# Patient Record
Sex: FEMALE | Race: WHITE | Employment: OTHER | URBAN - METROPOLITAN AREA
[De-identification: names, ages, dates, MRNs, and addresses within clinical notes are randomized per-mention and may not be internally consistent; named-entity substitution may affect disease eponyms.]

---

## 2017-02-16 ENCOUNTER — OFFICE VISIT (OUTPATIENT)
Dept: INTERNAL MEDICINE CLINIC | Facility: CLINIC | Age: 82
End: 2017-02-16

## 2017-02-16 VITALS
SYSTOLIC BLOOD PRESSURE: 110 MMHG | HEIGHT: 57.25 IN | BODY MASS INDEX: 29.05 KG/M2 | OXYGEN SATURATION: 97 % | RESPIRATION RATE: 16 BRPM | WEIGHT: 134.63 LBS | DIASTOLIC BLOOD PRESSURE: 60 MMHG | HEART RATE: 61 BPM | TEMPERATURE: 98 F

## 2017-02-16 DIAGNOSIS — M79.631 RIGHT FOREARM PAIN: Primary | ICD-10-CM

## 2017-02-16 DIAGNOSIS — J47.9 BRONCHIECTASIS WITHOUT COMPLICATION (HCC): ICD-10-CM

## 2017-02-16 DIAGNOSIS — I10 ESSENTIAL HYPERTENSION: ICD-10-CM

## 2017-02-16 DIAGNOSIS — R05.9 COUGH: ICD-10-CM

## 2017-02-16 PROCEDURE — 99214 OFFICE O/P EST MOD 30 MIN: CPT | Performed by: INTERNAL MEDICINE

## 2017-02-16 RX ORDER — PANTOPRAZOLE SODIUM 40 MG/1
40 TABLET, DELAYED RELEASE ORAL
Qty: 30 TABLET | Refills: 2 | Status: SHIPPED | OUTPATIENT
Start: 2017-02-16 | End: 2017-06-22

## 2017-02-16 NOTE — PROGRESS NOTES
Sandie Epperson is a 80year old female.  To F/U from last visit regarding rash and cough   HPI:    Interim history:her  has stage 4 bladder cancer    The cough she had years ago chronically came back in June, occasional cough, can have clear or gree mouth daily. Disp:  Rfl:    WARFARIN SODIUM 2.5 MG OR TABS 1 tab po qm,w,fr,sun Disp:  Rfl:    TYLENOL 325 MG OR TABS 1 Tab daily as needed.  Disp:  Rfl:    BENADRYL 25 MG OR CAPS 1 tab po qhs prn Disp:  Rfl:          Social History:    Smoking Status: Tesfaye Freeman entrapment, send to hand if perssits  Bronchiectasis without complication (hcc)- this was the ultimate diagnosis years ago when she was being evaluated for a cough but nothing helped then it just resolved  Cough- recurrent, ? GERD/PND/bronchiectasis- try pr

## 2017-03-09 RX ORDER — ATORVASTATIN CALCIUM 10 MG/1
TABLET, FILM COATED ORAL
Qty: 90 TABLET | Refills: 0 | Status: SHIPPED | OUTPATIENT
Start: 2017-03-09 | End: 2017-06-20

## 2017-04-24 PROBLEM — E78.2 MIXED HYPERLIPIDEMIA: Status: ACTIVE | Noted: 2017-04-24

## 2017-04-24 PROBLEM — R94.31 NONSPECIFIC ABNORMAL ELECTROCARDIOGRAM (ECG) (EKG): Status: ACTIVE | Noted: 2017-04-24

## 2017-06-10 ENCOUNTER — HOSPITAL ENCOUNTER (OUTPATIENT)
Age: 82
Discharge: HOME | End: 2017-06-10
Attending: EMERGENCY MEDICINE
Payer: MEDICARE

## 2017-06-10 VITALS
OXYGEN SATURATION: 98 % | DIASTOLIC BLOOD PRESSURE: 68 MMHG | TEMPERATURE: 98 F | HEART RATE: 72 BPM | SYSTOLIC BLOOD PRESSURE: 126 MMHG | RESPIRATION RATE: 16 BRPM

## 2017-06-10 DIAGNOSIS — N30.00 ACUTE CYSTITIS WITHOUT HEMATURIA: Primary | ICD-10-CM

## 2017-06-10 PROCEDURE — 87086 URINE CULTURE/COLONY COUNT: CPT | Performed by: EMERGENCY MEDICINE

## 2017-06-10 PROCEDURE — 87186 SC STD MICRODIL/AGAR DIL: CPT | Performed by: EMERGENCY MEDICINE

## 2017-06-10 PROCEDURE — 99214 OFFICE O/P EST MOD 30 MIN: CPT

## 2017-06-10 PROCEDURE — 81002 URINALYSIS NONAUTO W/O SCOPE: CPT

## 2017-06-10 PROCEDURE — 87088 URINE BACTERIA CULTURE: CPT | Performed by: EMERGENCY MEDICINE

## 2017-06-10 PROCEDURE — 99204 OFFICE O/P NEW MOD 45 MIN: CPT

## 2017-06-10 RX ORDER — CIPROFLOXACIN 500 MG/1
500 TABLET, FILM COATED ORAL 2 TIMES DAILY
Qty: 14 TABLET | Refills: 0 | Status: SHIPPED | OUTPATIENT
Start: 2017-06-10 | End: 2017-06-12

## 2017-06-10 NOTE — ED PROVIDER NOTES
Patient presents with:  Urinary Symptoms (urologic)      HPI:     Abeba Harrison is a 80year old female who presents with dysuria for the past 1 day. Slowly getting worse. + frequency. No fever No back pain.   No rigors or chills  No bloody urine      HIS Never Smoker                      Smokeless Status: Never Used                        Alcohol Use: Yes           0.0 - 0.5 oz/week       0-1 Standard drinks or equivalent per week       Comment: socially           ROS:   Pertinent positives dysuria and dragan and needs recheck PT and INR. Await Urine cultures  Push fluids and rest.  Go to ER if with high fevers chest pain shortness of breath flank pain etc.    All results reviewed and discussed with patient.   See AVS for detailed discharge instructions for you

## 2017-06-11 NOTE — ED NOTES
Pt called back. Pt informed preliminary urine culture results were back but we're still waiting on the sensitivity report. Pt states she's not comfortable continuing the ciprofloxacin and will stop taking it.  Pt states she will call back tomorrow for the f

## 2017-06-12 RX ORDER — SULFAMETHOXAZOLE AND TRIMETHOPRIM 800; 160 MG/1; MG/1
1 TABLET ORAL 2 TIMES DAILY
Qty: 10 TABLET | Refills: 0 | Status: SHIPPED | OUTPATIENT
Start: 2017-06-12 | End: 2017-06-17

## 2017-06-12 NOTE — ED NOTES
Spoke with patient over the phone regarding urine culture results. Patient was prescribed ciprofloxacin sensitive to E. coli. Patient took only 1 dose of Cipro so far and would like to change it to something else because of her side effect profile.   Areli

## 2017-06-21 ENCOUNTER — TELEPHONE (OUTPATIENT)
Dept: INTERNAL MEDICINE CLINIC | Facility: CLINIC | Age: 82
End: 2017-06-21

## 2017-06-21 RX ORDER — ATORVASTATIN CALCIUM 10 MG/1
TABLET, FILM COATED ORAL
Qty: 90 TABLET | Refills: 0 | Status: SHIPPED | OUTPATIENT
Start: 2017-06-21 | End: 2017-10-02

## 2017-06-21 NOTE — TELEPHONE ENCOUNTER
Spoke with pt. Advised as below. Pt voiced understanding. Pt states that she wants to see  Dr. Wale Burgess only. States that she had urinary burning one time today. Scheduled OV 6/22/17.

## 2017-06-21 NOTE — TELEPHONE ENCOUNTER
Patient called to see if there was a reply to her earlier message yet. She will be leaving the house for about an hour and would like the nurse to leave a message on her home voicemail, if she's not home at time of the call.

## 2017-06-21 NOTE — TELEPHONE ENCOUNTER
Pt went to [facility] 8-10 with UTI, finished antibiotic Friday, still has a bit of burning, just doesn't feel right. Not sure if she needs another antibiotic? Will not take cipro. Please advise.  Thank you

## 2017-06-22 ENCOUNTER — OFFICE VISIT (OUTPATIENT)
Dept: INTERNAL MEDICINE CLINIC | Facility: CLINIC | Age: 82
End: 2017-06-22

## 2017-06-22 VITALS
RESPIRATION RATE: 16 BRPM | OXYGEN SATURATION: 98 % | BODY MASS INDEX: 29.34 KG/M2 | WEIGHT: 136 LBS | TEMPERATURE: 97 F | SYSTOLIC BLOOD PRESSURE: 118 MMHG | HEART RATE: 64 BPM | HEIGHT: 57.25 IN | DIASTOLIC BLOOD PRESSURE: 62 MMHG

## 2017-06-22 DIAGNOSIS — R39.9 UTI SYMPTOMS: Primary | ICD-10-CM

## 2017-06-22 DIAGNOSIS — R31.9 HEMATURIA: ICD-10-CM

## 2017-06-22 PROCEDURE — 99213 OFFICE O/P EST LOW 20 MIN: CPT | Performed by: INTERNAL MEDICINE

## 2017-06-22 PROCEDURE — 81003 URINALYSIS AUTO W/O SCOPE: CPT | Performed by: INTERNAL MEDICINE

## 2017-06-22 PROCEDURE — 81001 URINALYSIS AUTO W/SCOPE: CPT | Performed by: INTERNAL MEDICINE

## 2017-06-22 PROCEDURE — 87086 URINE CULTURE/COLONY COUNT: CPT | Performed by: INTERNAL MEDICINE

## 2017-06-22 NOTE — PROGRESS NOTES
Luisana Morales is a 80year old female  Patient presents with:   Follow - Up: [facility] Visit - UTI, still having symptoms      HPI:   Had dysuria 10 days ago, went to [facility], took cipro, had diarrhea, changed to bactrim and took for 5 more days until 6 days ago, s term (current) use of anticoagulants     Nonspecific abnormal electrocardiogram (ECG) (EKG)     Mixed hyperlipidemia     Social History:    Smoking Status: Never Smoker                      Smokeless Status: Never Used                        Alcohol Use: Y Encounter  Urine Dip, auto without Micro  URINALYSIS, W MICROSCOPIC [2403][Q]  *Specimen Handling  Urine Culture, Routine [E]    Meds & Refills for this Visit:  No prescriptions requested or ordered in this encounter    Imaging & Consults:  None    No Foll

## 2017-08-10 ENCOUNTER — OFFICE VISIT (OUTPATIENT)
Dept: INTERNAL MEDICINE CLINIC | Facility: CLINIC | Age: 82
End: 2017-08-10

## 2017-08-10 VITALS
HEART RATE: 59 BPM | BODY MASS INDEX: 28.55 KG/M2 | HEIGHT: 57.75 IN | SYSTOLIC BLOOD PRESSURE: 104 MMHG | DIASTOLIC BLOOD PRESSURE: 52 MMHG | WEIGHT: 136 LBS | TEMPERATURE: 98 F | OXYGEN SATURATION: 98 % | RESPIRATION RATE: 16 BRPM

## 2017-08-10 DIAGNOSIS — R73.02 IGT (IMPAIRED GLUCOSE TOLERANCE): Primary | ICD-10-CM

## 2017-08-10 DIAGNOSIS — R05.3 CHRONIC COUGH: ICD-10-CM

## 2017-08-10 DIAGNOSIS — I10 ESSENTIAL HYPERTENSION: ICD-10-CM

## 2017-08-10 DIAGNOSIS — Z12.31 VISIT FOR SCREENING MAMMOGRAM: ICD-10-CM

## 2017-08-10 DIAGNOSIS — E78.2 MIXED HYPERLIPIDEMIA: ICD-10-CM

## 2017-08-10 DIAGNOSIS — Z00.00 ENCOUNTER FOR ANNUAL HEALTH EXAMINATION: ICD-10-CM

## 2017-08-10 PROCEDURE — 99214 OFFICE O/P EST MOD 30 MIN: CPT | Performed by: INTERNAL MEDICINE

## 2017-08-10 PROCEDURE — G0439 PPPS, SUBSEQ VISIT: HCPCS | Performed by: INTERNAL MEDICINE

## 2017-08-10 RX ORDER — OMEPRAZOLE 40 MG/1
40 CAPSULE, DELAYED RELEASE ORAL DAILY
Qty: 90 CAPSULE | Refills: 0 | Status: SHIPPED | OUTPATIENT
Start: 2017-08-10 | End: 2018-08-05

## 2017-08-10 NOTE — PATIENT INSTRUCTIONS
Casey Amin's SCREENING SCHEDULE   Tests on this list are recommended by your physician but may not be covered, or covered at this frequency, by your insurer. Please check with your insurance carrier before scheduling to verify coverage.    PREVENTATI or any previous visit.  Limited to patients who meet one of the following criteria:   • Men who are 73-68 years old and have smoked more than 100 cigarettes in their lifetime   • Anyone with a family history    Colorectal Cancer Screening  Covered up to Age Immunizations      Influenza  Covered Annually   Orders placed or performed in visit on 10/21/14  -FLU VACC PRSV FREE INC ANTIG    Please get every year    Pneumococcal 13 (Prevnar)  Covered Once after 65   Orders placed or performed in visit on 07/24/15 Directives.

## 2017-08-10 NOTE — PROGRESS NOTES
HPI:   Barrie Curry is a 80year old female who presents for a Medicare Subsequent Annual Wellness visit (Pt already had Initial Annual Wellness).     She also has IGT, HTN and high cholesterol and bronchiestasis  She sees cards for PAF  She has a  encounter (Office Visit) with Kerline Newton MD:  Omeprazole 40 MG Oral Capsule Delayed Release Take 1 capsule (40 mg total) by mouth daily.    ATORVASTATIN 10 MG Oral Tab TAKE 1 TABLET BY MOUTH EVERY NIGHT AT BEDTIME   METOPROLOL TARTRATE 50 MG Or past surgical history that includes appendectomy (22 y/o); total knee replacement (2002/03); colonoscopy (2007); correct bunion,simple; foot/toes surgery proc unlisted (1990's); and colonoscopy (5/30/2013).     Her family history includes Breast Cancer in a Date(s) Administered   • Influenza 10/18/2007, 10/06/2008, 10/17/2009   • Influenza Vaccine, High Dose, Preserv Free 10/21/2014   • Pneumococcal (Prevnar 13) 07/24/2015   • Pneumovax 23 01/01/1999       ASSESSMENT AND OTHER RELEVANT CHRONIC CONDITIONS:   J past six months, have you lost more than 10 pounds without trying?: 2 - No    Has your appetite been poor?: No    How does the patient maintain a good energy level?: Appropriate Exercise;Stretching    How would you describe your daily physical activity?: M weeks)?: Not at all    PHQ-2 SCORE: 0        Advance Directives     Do you have a healthcare power of ?: Yes    Do you have a living will?: No     Please go to \"Cognitive Assessment\" under Medicare Assessment section in Charting, test patient and Pap: Every 3 yrs age 21-65 or Pap+HPV every 5 yrs age 33-67, age 72 and older at high risk Pap Smear,3 Years due on 02/16/2015 Update Health Maintenance if applicable    Chlamydia  Annually if high risk No results found for: CHLAMYDIA No flowsheet data fou BUN (mg/dL)   Date Value   12/08/2016 13   04/29/2014 14    No flowsheet data found. Drug Serum Conc  Annually No results found for: DIGOXIN, DIG, VALP No flowsheet data found.     Diabetes      HgbA1C  Annually HGBA1C (%)   Date Value   04/29/2014 6.0

## 2017-10-02 NOTE — TELEPHONE ENCOUNTER
Received fax from Nadira Wilkerson at [facility] re: Atorvastatin refill. Routed to refills.

## 2017-10-03 RX ORDER — ATORVASTATIN CALCIUM 10 MG/1
TABLET, FILM COATED ORAL
Qty: 90 TABLET | Refills: 0 | Status: SHIPPED | OUTPATIENT
Start: 2017-10-03 | End: 2017-10-25

## 2017-10-19 ENCOUNTER — OFFICE VISIT (OUTPATIENT)
Dept: INTERNAL MEDICINE CLINIC | Facility: CLINIC | Age: 82
End: 2017-10-19

## 2017-10-19 VITALS
TEMPERATURE: 98 F | WEIGHT: 132.25 LBS | HEIGHT: 56.5 IN | HEART RATE: 67 BPM | BODY MASS INDEX: 28.93 KG/M2 | DIASTOLIC BLOOD PRESSURE: 60 MMHG | SYSTOLIC BLOOD PRESSURE: 146 MMHG | OXYGEN SATURATION: 99 % | RESPIRATION RATE: 16 BRPM

## 2017-10-19 DIAGNOSIS — Z01.419 ENCOUNTER FOR GYNECOLOGICAL EXAMINATION WITHOUT ABNORMAL FINDING: Primary | ICD-10-CM

## 2017-10-19 PROCEDURE — G0101 CA SCREEN;PELVIC/BREAST EXAM: HCPCS | Performed by: INTERNAL MEDICINE

## 2017-10-19 PROCEDURE — 90653 IIV ADJUVANT VACCINE IM: CPT | Performed by: INTERNAL MEDICINE

## 2017-10-19 PROCEDURE — G0008 ADMIN INFLUENZA VIRUS VAC: HCPCS | Performed by: INTERNAL MEDICINE

## 2017-10-19 RX ORDER — KETOROLAC TROMETHAMINE 5 MG/ML
SOLUTION OPHTHALMIC
Refills: 6 | COMMUNITY
Start: 2017-10-02 | End: 2018-04-23

## 2017-10-19 NOTE — PROGRESS NOTES
HPI:   Abeba Harrison is a 80year old female who presents for a medicare breast and pelvic exam. . Patient complains of  home hospice now, disease in his brain  Her son is there helping, he is on SS DIsabiltiy.      Wt Readings from Last 6 Encounte atrial fibrillation) (HCC)     Diastolic dysfunction     IGT (impaired glucose tolerance)     Mild aortic insufficiency     Osteoarthrosis, unspecified whether generalized or localized, lower leg right     Hyperlipidemia     Bronchiectasis ([facility] [address]     Maikel as indicated. The patient indicates understanding of these issues and agrees to the plan. Encounter for gynecological examination without abnormal finding  (primary encounter diagnosis)    No orders of the defined types were placed in this encounter.

## 2017-11-29 ENCOUNTER — HOSPITAL ENCOUNTER (OUTPATIENT)
Dept: MAMMOGRAPHY | Age: 82
Discharge: HOME | End: 2017-11-29
Attending: INTERNAL MEDICINE
Payer: MEDICARE

## 2017-11-29 DIAGNOSIS — Z12.31 VISIT FOR SCREENING MAMMOGRAM: ICD-10-CM

## 2017-11-29 PROCEDURE — 77067 SCR MAMMO BI INCL CAD: CPT | Performed by: INTERNAL MEDICINE

## 2018-01-17 PROBLEM — M17.11 PRIMARY OSTEOARTHRITIS OF RIGHT KNEE: Status: ACTIVE | Noted: 2018-01-17

## 2018-04-09 ENCOUNTER — TELEPHONE (OUTPATIENT)
Dept: INTERNAL MEDICINE CLINIC | Facility: CLINIC | Age: 83
End: 2018-04-09

## 2018-04-09 NOTE — TELEPHONE ENCOUNTER
Patient has had lower back pain for 1 week. She's not sure if the pain is related to her slipped disk, cramps or spasms. Please advise.

## 2018-04-10 NOTE — TELEPHONE ENCOUNTER
Called the patient back regarding the below. The patient states that she has a history of herniated disks, and is suddenly experiencing back pain when she twists.  The patient states she can bend over to touch her toes with \"no problem\", but when she twis

## 2018-04-12 NOTE — TELEPHONE ENCOUNTER
Patient called, she said her back is feeling much better, just slightly sore. Patient cancelled her appointment for tomorrow. ZAIRA thank you.

## 2018-04-17 ENCOUNTER — HOSPITAL ENCOUNTER (OUTPATIENT)
Dept: CV DIAGNOSTICS | Facility: HOSPITAL | Age: 83
Discharge: HOME | End: 2018-04-17
Attending: INTERNAL MEDICINE
Payer: MEDICARE

## 2018-04-17 DIAGNOSIS — I10 ESSENTIAL HYPERTENSION: ICD-10-CM

## 2018-04-17 DIAGNOSIS — I35.1 MILD AORTIC INSUFFICIENCY: ICD-10-CM

## 2018-04-17 DIAGNOSIS — E78.2 MIXED HYPERLIPIDEMIA: ICD-10-CM

## 2018-04-17 DIAGNOSIS — I48.0 PAF (PAROXYSMAL ATRIAL FIBRILLATION) (HCC): ICD-10-CM

## 2018-04-17 PROCEDURE — 93306 TTE W/DOPPLER COMPLETE: CPT | Performed by: INTERNAL MEDICINE

## 2018-04-23 ENCOUNTER — OFFICE VISIT (OUTPATIENT)
Dept: INTERNAL MEDICINE CLINIC | Facility: CLINIC | Age: 83
End: 2018-04-23

## 2018-04-23 VITALS
RESPIRATION RATE: 16 BRPM | DIASTOLIC BLOOD PRESSURE: 56 MMHG | WEIGHT: 140 LBS | HEART RATE: 64 BPM | BODY MASS INDEX: 30.62 KG/M2 | SYSTOLIC BLOOD PRESSURE: 118 MMHG | HEIGHT: 56.5 IN

## 2018-04-23 DIAGNOSIS — M54.50 ACUTE RIGHT-SIDED LOW BACK PAIN WITHOUT SCIATICA: Primary | ICD-10-CM

## 2018-04-23 PROCEDURE — 99213 OFFICE O/P EST LOW 20 MIN: CPT | Performed by: INTERNAL MEDICINE

## 2018-04-23 NOTE — PROGRESS NOTES
Hallie Lopez is a 80year old female  Patient presents with:  Back Pain: x 2 weeks. episodes of back pain. can lean forawrd but not twist. no known injury. new mattress 1 month ago.        HPI:   Back pain for 2 weeks, spasms, to turn sideways, resolv ([address]     Diastolic dysfunction     IGT (impaired glucose tolerance)     Mild aortic insufficiency     Osteoarthrosis, unspecified whether generalized or localized, lower leg right     Hyperlipidemia     Bronchiectasis ([address]     Essential hypertension & REHAB    No Follow-up on file. There are no Patient Instructions on file for this visit. The patient indicates understanding of these issues and agrees to the plan.

## 2018-05-28 ENCOUNTER — HOSPITAL ENCOUNTER (OUTPATIENT)
Age: 83
Discharge: HOME | End: 2018-05-28
Attending: FAMILY MEDICINE
Payer: MEDICARE

## 2018-05-28 VITALS
RESPIRATION RATE: 20 BRPM | WEIGHT: 140 LBS | OXYGEN SATURATION: 100 % | BODY MASS INDEX: 31 KG/M2 | SYSTOLIC BLOOD PRESSURE: 131 MMHG | TEMPERATURE: 99 F | DIASTOLIC BLOOD PRESSURE: 70 MMHG | HEART RATE: 62 BPM

## 2018-05-28 DIAGNOSIS — N30.00 ACUTE CYSTITIS WITHOUT HEMATURIA: Primary | ICD-10-CM

## 2018-05-28 PROCEDURE — 99214 OFFICE O/P EST MOD 30 MIN: CPT

## 2018-05-28 PROCEDURE — 81002 URINALYSIS NONAUTO W/O SCOPE: CPT | Performed by: FAMILY MEDICINE

## 2018-05-28 PROCEDURE — 87086 URINE CULTURE/COLONY COUNT: CPT | Performed by: FAMILY MEDICINE

## 2018-05-28 RX ORDER — SULFAMETHOXAZOLE AND TRIMETHOPRIM 800; 160 MG/1; MG/1
1 TABLET ORAL 2 TIMES DAILY
Qty: 10 TABLET | Refills: 0 | Status: SHIPPED | OUTPATIENT
Start: 2018-05-28 | End: 2019-08-15

## 2018-05-28 NOTE — ED PROVIDER NOTES
Patient Seen in: [facility]    History   Patient presents with:  Urinary Symptoms (urologic)    Stated Complaint: UT symptoms x 7 days    HPI  80-year-old female presents to the immediate care today with chief complaint problem.     Smoking status: Never Smoker                                                              Smokeless tobacco: Never Used                      Alcohol use: Yes           0.0 - 0.5 oz/week     Standard drinks or equivalent: 0 - 1 per week     Comm components within normal limits   URINE CULTURE, ROUTINE       ED Course as of May 28 1345  ------------------------------------------------------------      MDM       UA positive for large white blood cells, 100+ proteins, large blood, cloudy.   Urine cult

## 2018-05-28 NOTE — ED INITIAL ASSESSMENT (HPI)
Pt c/o urinary s/s over the last 7 days. Pt reports she has had burning on urination intermittently. Denies any hematuria, fever, chills, nausea or vomiting.

## 2018-06-05 ENCOUNTER — HOSPITAL ENCOUNTER (OUTPATIENT)
Dept: PHYSICAL THERAPY | Facility: HOSPITAL | Age: 83
Setting detail: THERAPIES SERIES
End: 2018-06-05
Attending: INTERNAL MEDICINE
Payer: MEDICARE

## 2018-06-07 ENCOUNTER — HOSPITAL ENCOUNTER (OUTPATIENT)
Dept: PHYSICAL THERAPY | Facility: HOSPITAL | Age: 83
Setting detail: THERAPIES SERIES
Discharge: HOME | End: 2018-06-07
Attending: INTERNAL MEDICINE
Payer: MEDICARE

## 2018-06-07 ENCOUNTER — HOSPITAL ENCOUNTER (OUTPATIENT)
Dept: PHYSICAL THERAPY | Facility: HOSPITAL | Age: 83
Setting detail: THERAPIES SERIES
End: 2018-06-07
Attending: INTERNAL MEDICINE
Payer: MEDICARE

## 2018-06-07 PROCEDURE — 97161 PT EVAL LOW COMPLEX 20 MIN: CPT

## 2018-06-07 PROCEDURE — 97110 THERAPEUTIC EXERCISES: CPT

## 2018-06-07 NOTE — PROGRESS NOTES
SPINE EVALUATION:   Referring Physician: Dr. Kirby Seth  Diagnosis: Low back pain     Date of Service: 6/7/2018     PATIENT SUMMARY   Sandie Epperson is a 80year old y/o female who presents to therapy today with complaints of low back pain which fir lumbar mobility and strengthening to improve activity tolerance and decrease pain levels. The patient is a good candidate for physical therapy.   Chalo Guevara would benefit from skilled Physical Therapy to address the above impairments to improve lumbar range of mo plan of care for therapy. Performed lower lumbar flexion mobilizations grade II-III to reduce pain. Performed exercises including hooklying marching, hooklying bent knee fall out, and seated lumbar flexion.  Performed standing hip flexor stretching to impro [phone]    Sincerely,  Electronically signed by therapist: Natalie Bravo, PT    [de-identified] certification required: Yes  I certify the need for these services furnished under this plan of treatment and while under my care.     X__________________________

## 2018-06-12 ENCOUNTER — HOSPITAL ENCOUNTER (OUTPATIENT)
Dept: PHYSICAL THERAPY | Facility: HOSPITAL | Age: 83
Setting detail: THERAPIES SERIES
Discharge: HOME | End: 2018-06-12
Attending: INTERNAL MEDICINE
Payer: MEDICARE

## 2018-06-12 PROCEDURE — 97110 THERAPEUTIC EXERCISES: CPT

## 2018-06-12 PROCEDURE — 97140 MANUAL THERAPY 1/> REGIONS: CPT

## 2018-06-12 NOTE — PROGRESS NOTES
Dx: Low Back Pain         Authorized # of Visits:  8         Next MD visit: none scheduled  Fall Risk: standard         Precautions: n/a             Subjective: The patient reports that she has been doing her home exercises.  She notes that her back is sore Posterior pelvic tilt 2x10 to improve lumbar mobility         Sit<>stand from elevated mat table without use of upper extremities to improve functional strength 2x10         Cone tapping to improve balance strategies and tolerance for weightbearing 2

## 2018-06-14 ENCOUNTER — APPOINTMENT (OUTPATIENT)
Dept: PHYSICAL THERAPY | Facility: HOSPITAL | Age: 83
End: 2018-06-14
Attending: INTERNAL MEDICINE
Payer: MEDICARE

## 2018-06-19 ENCOUNTER — APPOINTMENT (OUTPATIENT)
Dept: PHYSICAL THERAPY | Facility: HOSPITAL | Age: 83
End: 2018-06-19
Attending: INTERNAL MEDICINE
Payer: MEDICARE

## 2018-06-21 ENCOUNTER — HOSPITAL ENCOUNTER (OUTPATIENT)
Dept: PHYSICAL THERAPY | Facility: HOSPITAL | Age: 83
Setting detail: THERAPIES SERIES
Discharge: HOME | End: 2018-06-21
Attending: INTERNAL MEDICINE
Payer: MEDICARE

## 2018-06-21 PROCEDURE — 97110 THERAPEUTIC EXERCISES: CPT

## 2018-06-21 PROCEDURE — 97140 MANUAL THERAPY 1/> REGIONS: CPT

## 2018-06-21 NOTE — PROGRESS NOTES
Dx: Low Back Pain         Authorized # of Visits:  8         Next MD visit: none scheduled  Fall Risk: standard         Precautions: n/a             Subjective:  The patient states that she cancelled the last visit because her knees were too painful to walk grade II to reduce pain multiple bouts Added sidelying p-a grade II to improve mobility and decrease pain levels, multiple bouts        Supine swiss ball hip flexion 10x ea Supine swiss ball hip flexion 10x ea        Supine march/bend knee fall out to [facility]

## 2018-06-26 ENCOUNTER — APPOINTMENT (OUTPATIENT)
Dept: PHYSICAL THERAPY | Facility: HOSPITAL | Age: 83
End: 2018-06-26
Attending: INTERNAL MEDICINE
Payer: MEDICARE

## 2018-06-28 ENCOUNTER — APPOINTMENT (OUTPATIENT)
Dept: PHYSICAL THERAPY | Facility: HOSPITAL | Age: 83
End: 2018-06-28
Attending: INTERNAL MEDICINE
Payer: MEDICARE

## 2018-07-03 ENCOUNTER — HOSPITAL ENCOUNTER (OUTPATIENT)
Dept: PHYSICAL THERAPY | Facility: HOSPITAL | Age: 83
Setting detail: THERAPIES SERIES
Discharge: HOME | End: 2018-07-03
Attending: INTERNAL MEDICINE
Payer: MEDICARE

## 2018-07-03 PROCEDURE — 97110 THERAPEUTIC EXERCISES: CPT

## 2018-07-03 PROCEDURE — 97140 MANUAL THERAPY 1/> REGIONS: CPT

## 2018-07-03 NOTE — PROGRESS NOTES
Dx: Low Back Pain         Authorized # of Visits:  8         Next MD visit: none scheduled  Fall Risk: standard         Precautions: n/a             Subjective: The patient states that she has had to cancel the last few visits because she has had vertigo. to reduce pain multiple bouts Added sidelying p-a grade II to improve mobility and decrease pain levels, multiple bouts sidelying p-a grade II to improve mobility and decrease pain levels, multiple bouts       Supine swiss ball hip flexion 10x ea Supine sw

## 2018-07-06 ENCOUNTER — HOSPITAL ENCOUNTER (OUTPATIENT)
Dept: PHYSICAL THERAPY | Facility: HOSPITAL | Age: 83
Setting detail: THERAPIES SERIES
Discharge: HOME | End: 2018-07-06
Attending: INTERNAL MEDICINE
Payer: MEDICARE

## 2018-07-06 PROCEDURE — 97110 THERAPEUTIC EXERCISES: CPT

## 2018-07-06 PROCEDURE — 97140 MANUAL THERAPY 1/> REGIONS: CPT

## 2018-07-06 NOTE — PROGRESS NOTES
Dx: Low Back Pain         Authorized # of Visits:  8         Next MD visit: none scheduled  Fall Risk: standard         Precautions: n/a             Subjective:  The patient reports that her back has been feeling a little sore still, but will likely feel mu mobility and decrease pain levels, multiple bouts sidelying p-a grade II to improve mobility and decrease pain levels, multiple bouts sidelying p-a grade II to improve mobility and decrease pain levels, multiple bouts      Supine swiss ball hip flexion 10x

## 2018-08-22 ENCOUNTER — TELEPHONE (OUTPATIENT)
Dept: INTERNAL MEDICINE CLINIC | Facility: CLINIC | Age: 83
End: 2018-08-22

## 2018-09-27 ENCOUNTER — OFFICE VISIT (OUTPATIENT)
Dept: INTERNAL MEDICINE CLINIC | Facility: CLINIC | Age: 83
End: 2018-09-27

## 2018-09-27 VITALS
OXYGEN SATURATION: 99 % | TEMPERATURE: 98 F | HEIGHT: 57.5 IN | BODY MASS INDEX: 29.79 KG/M2 | DIASTOLIC BLOOD PRESSURE: 76 MMHG | WEIGHT: 140 LBS | SYSTOLIC BLOOD PRESSURE: 126 MMHG | RESPIRATION RATE: 16 BRPM | HEART RATE: 69 BPM

## 2018-09-27 DIAGNOSIS — I10 ESSENTIAL HYPERTENSION: ICD-10-CM

## 2018-09-27 DIAGNOSIS — N32.81 OAB (OVERACTIVE BLADDER): ICD-10-CM

## 2018-09-27 DIAGNOSIS — R05.3 CHRONIC COUGH: ICD-10-CM

## 2018-09-27 DIAGNOSIS — Z12.11 COLON CANCER SCREENING: ICD-10-CM

## 2018-09-27 DIAGNOSIS — Z12.31 ENCOUNTER FOR SCREENING MAMMOGRAM FOR BREAST CANCER: ICD-10-CM

## 2018-09-27 DIAGNOSIS — J47.9 BRONCHIECTASIS WITHOUT COMPLICATION (HCC): ICD-10-CM

## 2018-09-27 DIAGNOSIS — Z00.00 ENCOUNTER FOR ANNUAL HEALTH EXAMINATION: Primary | ICD-10-CM

## 2018-09-27 DIAGNOSIS — E78.00 HIGH CHOLESTEROL: ICD-10-CM

## 2018-09-27 DIAGNOSIS — R73.9 HYPERGLYCEMIA: ICD-10-CM

## 2018-09-27 PROCEDURE — 99214 OFFICE O/P EST MOD 30 MIN: CPT | Performed by: INTERNAL MEDICINE

## 2018-09-27 PROCEDURE — G0008 ADMIN INFLUENZA VIRUS VAC: HCPCS | Performed by: INTERNAL MEDICINE

## 2018-09-27 PROCEDURE — G0439 PPPS, SUBSEQ VISIT: HCPCS | Performed by: INTERNAL MEDICINE

## 2018-09-27 PROCEDURE — 90653 IIV ADJUVANT VACCINE IM: CPT | Performed by: INTERNAL MEDICINE

## 2018-09-27 RX ORDER — BRIMONIDINE TARTRATE 0.15 %
2 DROPS OPHTHALMIC (EYE) 2 TIMES DAILY
COMMUNITY
Start: 2018-03-12 | End: 2018-09-27 | Stop reason: ALTCHOICE

## 2018-09-27 RX ORDER — DORZOLAMIDE HYDROCHLORIDE AND TIMOLOL MALEATE 20; 5 MG/ML; MG/ML
1 SOLUTION/ DROPS OPHTHALMIC 2 TIMES DAILY
Refills: 2 | COMMUNITY

## 2018-09-27 NOTE — PROGRESS NOTES
HPI:   Uma Jean is a 80year old female who presents for a Medicare Subsequent Annual Wellness visit (Pt already had Initial Annual Wellness). She also has IGT, HTN and high cholesterol and bronchiestasis.  She has ongoing chronic cough and OAB Component Value Date    AST 22 10/25/2017    ALT 25 10/25/2017    CA 9.6 10/25/2017    ALB 4.3 10/25/2017    CREATSERUM 0.85 10/25/2017     (H) 10/25/2017        CBC  (most recent labs)   Lab Results   Component Value Date    WBC 6.36 10/25/2017 History of bone density study (10/06), IGT (impaired glucose tolerance) (3/25/2014), Infection (2/20/2011), Insomnia, Lipid screening (4/5/12), Ocular migraine, Other and unspecified hyperlipidemia, PAF (paroxysmal atrial fibrillation) ([facility] (6/28/2001), P not enlarged, symmetric, no tenderness/mass/nodules  Lungs: clear to auscultation bilaterally  Heart: S1, S2 normal, no murmur, click, rub or gallop, regular rate and rhythm  Extremities: extremities normal, atraumatic, no cyanosis or edema  Pulses: 2+ and 5 [facility] on file in Epic:    Taylor Yeboah does not have a Power of  for [employer] on file in [place].  Discussed with patient and provided information           PLAN:  The patient indicates understanding of these issues and a Not at all    PHQ-2 SCORE: 0        Advance Directives     Do you have a healthcare power of ?: No    Do you have a living will?: No     Please go to \"Cognitive Assessment\" under Medicare Assessment section in Charting, test patient and document. and older at high risk Pap Smear,3 Years due on 02/16/2015 Update Health Maintenance if applicable    Chlamydia  Annually if high risk No results found for: CHLAMYDIA No flowsheet data found.     Screening Mammogram      Mammogram Annually to 76, then as di 04/29/2014 14     Blood Urea Nitrogen (mg/dL)   Date Value   10/25/2017 14    No flowsheet data found. Drug Serum Conc  Annually No results found for: DIGOXIN, DIG, VALP No flowsheet data found.     Diabetes      HgbA1C  Annually HGBA1C (%)   Date Ayse

## 2018-09-27 NOTE — PATIENT INSTRUCTIONS
Simona Amin's SCREENING SCHEDULE   Tests on this list are recommended by your physician but may not be covered, or covered at this frequency, by your insurer. Please check with your insurance carrier before scheduling to verify coverage.    PREVENTATI Limited to patients who meet one of the following criteria:   • Men who are 73-68 years old and have smoked more than 100 cigarettes in their lifetime   • Anyone with a family history    Colorectal Cancer Screening  Covered up to Age 76     Colonoscopy Scr Influenza  Covered Annually Orders placed or performed in visit on 10/21/14   • FLU VACC PRSV FREE INC ANTIG    Please get every year    Pneumococcal 13 (Prevnar)  Covered Once after 65 Orders placed or performed in visit on 07/24/15   • PNEUMOCOCCAL VACC,

## 2018-11-19 ENCOUNTER — APPOINTMENT (OUTPATIENT)
Dept: LAB | Age: 83
End: 2018-11-19
Attending: INTERNAL MEDICINE
Payer: MEDICARE

## 2018-11-19 DIAGNOSIS — E78.00 HIGH CHOLESTEROL: ICD-10-CM

## 2018-11-19 DIAGNOSIS — I10 ESSENTIAL HYPERTENSION: ICD-10-CM

## 2018-11-19 DIAGNOSIS — R73.9 HYPERGLYCEMIA: ICD-10-CM

## 2018-11-19 PROCEDURE — 80053 COMPREHEN METABOLIC PANEL: CPT

## 2018-11-19 PROCEDURE — 83036 HEMOGLOBIN GLYCOSYLATED A1C: CPT

## 2018-11-19 PROCEDURE — 36415 COLL VENOUS BLD VENIPUNCTURE: CPT

## 2018-11-19 PROCEDURE — 80061 LIPID PANEL: CPT

## 2018-11-30 ENCOUNTER — HOSPITAL ENCOUNTER (OUTPATIENT)
Dept: MAMMOGRAPHY | Age: 83
Discharge: HOME | End: 2018-11-30
Attending: INTERNAL MEDICINE
Payer: MEDICARE

## 2018-11-30 DIAGNOSIS — Z12.31 ENCOUNTER FOR SCREENING MAMMOGRAM FOR BREAST CANCER: ICD-10-CM

## 2018-11-30 PROCEDURE — 77063 BREAST TOMOSYNTHESIS BI: CPT | Performed by: INTERNAL MEDICINE

## 2018-11-30 PROCEDURE — 77067 SCR MAMMO BI INCL CAD: CPT | Performed by: INTERNAL MEDICINE

## 2018-12-22 NOTE — ED NOTES
PT. Called to [facility] Immediate care seeking refill of Tramadol. Pt reports she sees Dr. Glenn Araujo with [facility] ortho. Scheduled for knee surgery in January 2019. She has prescription for Tramadol from earlier this month. Has one tablet left.   Sh

## 2018-12-22 NOTE — ED NOTES
Received call-back from [address] [place] [address] on-call. Spoke with Miley . He said he is unable to authorize more Tramadol over the phone at this time. Per note from Dr. Maritza Jefferson, pt. Needs to cut down on use prior to surgery. He recommends pt.  Call Dr. Elif Guerra office Leti Bradley

## 2019-01-02 ENCOUNTER — TELEPHONE (OUTPATIENT)
Dept: INTERNAL MEDICINE CLINIC | Facility: CLINIC | Age: 84
End: 2019-01-02

## 2019-01-02 NOTE — TELEPHONE ENCOUNTER
Date Pre-op Schedule w/Provider: 01-14-19  Surgeon's Name: Dr. Ole Segundo    Phone #: [phone]  Surgery Date: 01-28-19  Procedure/Diagnosis: Knee Replacement Right Knee

## 2019-01-07 RX ORDER — MECLIZINE HCL 12.5 MG/1
12.5 TABLET ORAL 3 TIMES DAILY PRN
COMMUNITY

## 2019-01-10 NOTE — TELEPHONE ENCOUNTER
Incoming (mail or fax):   Fax  Received from:  Caitlin 2  Documentation given to:  Left documentation in pre op incoming bin

## 2019-01-10 NOTE — TELEPHONE ENCOUNTER
refaxed pre-op paperwork to Dr Zoya Guerrero office with lab request from [facility]. Also faxed [facility] lab request back to [facility] advising labs to be ordered under Dr Ld Velazquez.

## 2019-01-11 NOTE — TELEPHONE ENCOUNTER
Pre Op Clearance Request from received from Dr. Parrish Marcos office. The patient has a pre op with Dr. Leandro Akhtar on 01/14/19. She is scheduled for a right knee replacement surgery with Dr. Mera Jones on 01/28/19. Clearance form request given to [facility].

## 2019-01-14 ENCOUNTER — OFFICE VISIT (OUTPATIENT)
Dept: INTERNAL MEDICINE CLINIC | Facility: CLINIC | Age: 84
End: 2019-01-14
Payer: MEDICARE

## 2019-01-14 VITALS
BODY MASS INDEX: 27.66 KG/M2 | HEIGHT: 57.5 IN | SYSTOLIC BLOOD PRESSURE: 112 MMHG | RESPIRATION RATE: 14 BRPM | OXYGEN SATURATION: 99 % | WEIGHT: 130 LBS | DIASTOLIC BLOOD PRESSURE: 68 MMHG | TEMPERATURE: 98 F | HEART RATE: 76 BPM

## 2019-01-14 DIAGNOSIS — I48.0 PAF (PAROXYSMAL ATRIAL FIBRILLATION) (HCC): ICD-10-CM

## 2019-01-14 DIAGNOSIS — R73.02 IGT (IMPAIRED GLUCOSE TOLERANCE): ICD-10-CM

## 2019-01-14 DIAGNOSIS — E78.2 MIXED HYPERLIPIDEMIA: ICD-10-CM

## 2019-01-14 DIAGNOSIS — I10 ESSENTIAL HYPERTENSION: ICD-10-CM

## 2019-01-14 DIAGNOSIS — Z01.818 PREOPERATIVE CLEARANCE: Primary | ICD-10-CM

## 2019-01-14 DIAGNOSIS — Z78.0 POSTMENOPAUSAL: ICD-10-CM

## 2019-01-14 DIAGNOSIS — M17.11 PRIMARY OSTEOARTHRITIS OF RIGHT KNEE: ICD-10-CM

## 2019-01-14 PROCEDURE — 99214 OFFICE O/P EST MOD 30 MIN: CPT | Performed by: INTERNAL MEDICINE

## 2019-01-14 NOTE — PROGRESS NOTES
Dayron Peace is a 80year old female who presents for a pre-operative physical exam. Patient is to have total right knee replacement , to be done by Dr. Crista Carver  at [facility]  on 1/28/19.       HPI:   Pt complains of ongoing issues with her right knee  She has been Disp:  Rfl:    BENADRYL 25 MG OR CAPS 1 tab po qhs prn Disp:  Rfl:       Allergies:   Amoxicillin-Fd&C Re*    SWELLING    Comment:Swollen tounge  Nitrofurantoin          DIARRHEA   Past Medical History:   Diagnosis Date   • Acute sinusitis 11/18/08   • Atr use: Yes      Alcohol/week: 0.0 - 0.5 oz      Comment: socially    Drug use: No          REVIEW OF SYSTEMS:   GENERAL: feels well otherwise  SKIN: denies any unusual skin lesions  EYES:denies blurred vision or double vision  HEENT: denies nasal congestion, Dr. Yulia Knowles  at [facility]  on 1/28/19. Pt has the following conditions: PAF, currently in sinus, on coumadin, HTN, controlled, IGT, mild, high cholesterol on statin, bronchiectasisi , mild on imaging , w/chronic cough thought to be more due to PND. Lamont Randall  Pt has no signif

## 2019-01-21 ENCOUNTER — LABORATORY ENCOUNTER (OUTPATIENT)
Dept: LAB | Facility: HOSPITAL | Age: 84
End: 2019-01-21
Attending: ORTHOPAEDIC SURGERY
Payer: MEDICARE

## 2019-01-21 ENCOUNTER — HOSPITAL ENCOUNTER (OUTPATIENT)
Dept: PHYSICAL THERAPY | Facility: HOSPITAL | Age: 84
Discharge: HOME | End: 2019-01-21
Attending: ORTHOPAEDIC SURGERY
Payer: MEDICARE

## 2019-01-21 DIAGNOSIS — M17.11 PRIMARY OSTEOARTHRITIS OF RIGHT KNEE: ICD-10-CM

## 2019-01-21 LAB
ALBUMIN SERPL-MCNC: 4 G/DL (ref 3.1–4.5)
ALBUMIN/GLOB SERPL: 1.3 {RATIO} (ref 1–2)
ALP LIVER SERPL-CCNC: 77 U/L (ref 55–142)
ALT SERPL-CCNC: 19 U/L (ref 14–54)
ANION GAP SERPL CALC-SCNC: 6 MMOL/L (ref 0–18)
ANTIBODY SCREEN: NEGATIVE
APTT PPP: 41.6 SECONDS (ref 26.1–34.6)
AST SERPL-CCNC: 22 U/L (ref 15–41)
BASOPHILS # BLD AUTO: 0.01 X10(3) UL (ref 0–0.1)
BASOPHILS NFR BLD AUTO: 0.2 %
BILIRUB SERPL-MCNC: 0.4 MG/DL (ref 0.1–2)
BUN BLD-MCNC: 9 MG/DL (ref 8–20)
BUN/CREAT SERPL: 12.2 (ref 10–20)
CALCIUM BLD-MCNC: 8.8 MG/DL (ref 8.3–10.3)
CHLORIDE SERPL-SCNC: 104 MMOL/L (ref 101–111)
CO2 SERPL-SCNC: 29 MMOL/L (ref 22–32)
CREAT BLD-MCNC: 0.74 MG/DL (ref 0.55–1.02)
EOSINOPHIL # BLD AUTO: 0.14 X10(3) UL (ref 0–0.3)
EOSINOPHIL NFR BLD AUTO: 2.4 %
ERYTHROCYTE [DISTWIDTH] IN BLOOD BY AUTOMATED COUNT: 13.2 % (ref 11.5–16)
GLOBULIN PLAS-MCNC: 3.2 G/DL (ref 2.8–4.4)
GLUCOSE BLD-MCNC: 106 MG/DL (ref 70–99)
HCT VFR BLD AUTO: 40.4 % (ref 34–50)
HGB BLD-MCNC: 13.6 G/DL (ref 12–16)
IMMATURE GRANULOCYTE COUNT: 0.03 X10(3) UL (ref 0–1)
IMMATURE GRANULOCYTE RATIO %: 0.5 %
INR BLD: 3.41 (ref 0.9–1.1)
LYMPHOCYTES # BLD AUTO: 1.72 X10(3) UL (ref 0.9–4)
LYMPHOCYTES NFR BLD AUTO: 28.9 %
M PROTEIN MFR SERPL ELPH: 7.2 G/DL (ref 6.4–8.2)
MCH RBC QN AUTO: 29.1 PG (ref 27–33.2)
MCHC RBC AUTO-ENTMCNC: 33.7 G/DL (ref 31–37)
MCV RBC AUTO: 86.5 FL (ref 81–100)
MONOCYTES # BLD AUTO: 0.46 X10(3) UL (ref 0.1–1)
MONOCYTES NFR BLD AUTO: 7.7 %
NEUTROPHIL ABS PRELIM: 3.59 X10 (3) UL (ref 1.3–6.7)
NEUTROPHILS # BLD AUTO: 3.59 X10(3) UL (ref 1.3–6.7)
NEUTROPHILS NFR BLD AUTO: 60.3 %
OSMOLALITY SERPL CALC.SUM OF ELEC: 287 MOSM/KG (ref 275–295)
PLATELET # BLD AUTO: 272 10(3)UL (ref 150–450)
POTASSIUM SERPL-SCNC: 4 MMOL/L (ref 3.6–5.1)
PSA SERPL DL<=0.01 NG/ML-MCNC: 35.5 SECONDS (ref 12.4–14.7)
RBC # BLD AUTO: 4.67 X10(6)UL (ref 3.8–5.1)
RED CELL DISTRIBUTION WIDTH-SD: 41.1 FL (ref 35.1–46.3)
RH BLOOD TYPE: POSITIVE
SODIUM SERPL-SCNC: 139 MMOL/L (ref 136–144)
WBC # BLD AUTO: 6 X10(3) UL (ref 4–13)

## 2019-01-21 PROCEDURE — 85610 PROTHROMBIN TIME: CPT

## 2019-01-21 PROCEDURE — 85730 THROMBOPLASTIN TIME PARTIAL: CPT

## 2019-01-21 PROCEDURE — 80053 COMPREHEN METABOLIC PANEL: CPT

## 2019-01-21 PROCEDURE — 86850 RBC ANTIBODY SCREEN: CPT

## 2019-01-21 PROCEDURE — 86901 BLOOD TYPING SEROLOGIC RH(D): CPT

## 2019-01-21 PROCEDURE — 85025 COMPLETE CBC W/AUTO DIFF WBC: CPT

## 2019-01-21 PROCEDURE — 86900 BLOOD TYPING SEROLOGIC ABO: CPT

## 2019-01-21 PROCEDURE — 87081 CULTURE SCREEN ONLY: CPT

## 2019-01-21 PROCEDURE — 36415 COLL VENOUS BLD VENIPUNCTURE: CPT

## 2019-01-28 ENCOUNTER — ANESTHESIA EVENT (OUTPATIENT)
Dept: SURGERY | Facility: HOSPITAL | Age: 84
DRG: 470 | End: 2019-01-28
Payer: MEDICARE

## 2019-01-28 ENCOUNTER — HOSPITAL ENCOUNTER (INPATIENT)
Facility: HOSPITAL | Age: 84
LOS: 2 days | Discharge: HOME HEALTH | DRG: 470 | End: 2019-01-30
Attending: ORTHOPAEDIC SURGERY | Admitting: ORTHOPAEDIC SURGERY
Payer: MEDICARE

## 2019-01-28 ENCOUNTER — APPOINTMENT (OUTPATIENT)
Dept: GENERAL RADIOLOGY | Facility: HOSPITAL | Age: 84
DRG: 470 | End: 2019-01-28
Attending: PHYSICIAN ASSISTANT
Payer: MEDICARE

## 2019-01-28 ENCOUNTER — ANESTHESIA (OUTPATIENT)
Dept: SURGERY | Facility: HOSPITAL | Age: 84
DRG: 470 | End: 2019-01-28
Payer: MEDICARE

## 2019-01-28 DIAGNOSIS — M17.11 PRIMARY OSTEOARTHRITIS OF RIGHT KNEE: Primary | ICD-10-CM

## 2019-01-28 LAB
APTT PPP: 26.3 SECONDS (ref 26.1–34.6)
INR BLD: 1.07 (ref 0.9–1.1)
PSA SERPL DL<=0.01 NG/ML-MCNC: 14.3 SECONDS (ref 12.4–14.7)

## 2019-01-28 PROCEDURE — 3E0T3BZ INTRODUCTION OF ANESTHETIC AGENT INTO PERIPHERAL NERVES AND PLEXI, PERCUTANEOUS APPROACH: ICD-10-PCS | Performed by: ANESTHESIOLOGY

## 2019-01-28 PROCEDURE — 73560 X-RAY EXAM OF KNEE 1 OR 2: CPT | Performed by: PHYSICIAN ASSISTANT

## 2019-01-28 PROCEDURE — 0SRC0J9 REPLACEMENT OF RIGHT KNEE JOINT WITH SYNTHETIC SUBSTITUTE, CEMENTED, OPEN APPROACH: ICD-10-PCS | Performed by: ORTHOPAEDIC SURGERY

## 2019-01-28 PROCEDURE — 99223 1ST HOSP IP/OBS HIGH 75: CPT | Performed by: HOSPITALIST

## 2019-01-28 DEVICE — ATTUNE KNEE SYSTEM TIBIAL INSERT ROTATING PLATFORM POSTERIOR STABILIZED 4 5MM AOX
Type: IMPLANTABLE DEVICE | Site: KNEE | Status: FUNCTIONAL
Brand: ATTUNE

## 2019-01-28 DEVICE — ATTUNE KNEE SYSTEM FEMORAL POSTERIOR STABILIZED NARROW SIZE 4N RIGHT CEMENTED
Type: IMPLANTABLE DEVICE | Site: KNEE | Status: FUNCTIONAL
Brand: ATTUNE

## 2019-01-28 DEVICE — CEMENT BONE RADIOPAQ HOWMEDICA: Type: IMPLANTABLE DEVICE | Site: KNEE | Status: FUNCTIONAL

## 2019-01-28 RX ORDER — BISACODYL 10 MG
10 SUPPOSITORY, RECTAL RECTAL
Status: DISCONTINUED | OUTPATIENT
Start: 2019-01-28 | End: 2019-01-30

## 2019-01-28 RX ORDER — WARFARIN SODIUM 5 MG/1
TABLET ORAL SEE ADMIN INSTRUCTIONS
COMMUNITY
End: 2019-12-31

## 2019-01-28 RX ORDER — OXYCODONE HYDROCHLORIDE 5 MG/1
5 TABLET ORAL EVERY 4 HOURS PRN
Status: DISCONTINUED | OUTPATIENT
Start: 2019-01-28 | End: 2019-01-29

## 2019-01-28 RX ORDER — DIGOXIN 125 MCG
125 TABLET ORAL DAILY
Status: DISCONTINUED | OUTPATIENT
Start: 2019-01-29 | End: 2019-01-30

## 2019-01-28 RX ORDER — DIPHENHYDRAMINE HYDROCHLORIDE 50 MG/ML
12.5 INJECTION INTRAMUSCULAR; INTRAVENOUS EVERY 4 HOURS PRN
Status: DISCONTINUED | OUTPATIENT
Start: 2019-01-28 | End: 2019-01-29

## 2019-01-28 RX ORDER — DORZOLAMIDE HYDROCHLORIDE AND TIMOLOL MALEATE 20; 5 MG/ML; MG/ML
1 SOLUTION/ DROPS OPHTHALMIC 2 TIMES DAILY
Status: DISCONTINUED | OUTPATIENT
Start: 2019-01-28 | End: 2019-01-30

## 2019-01-28 RX ORDER — ACETAMINOPHEN 325 MG/1
650 TABLET ORAL ONCE
Status: COMPLETED | OUTPATIENT
Start: 2019-01-28 | End: 2019-01-28

## 2019-01-28 RX ORDER — SENNOSIDES 8.6 MG
17.2 TABLET ORAL NIGHTLY
Status: DISCONTINUED | OUTPATIENT
Start: 2019-01-28 | End: 2019-01-30

## 2019-01-28 RX ORDER — TIZANIDINE 2 MG/1
2 TABLET ORAL 3 TIMES DAILY PRN
Status: DISCONTINUED | OUTPATIENT
Start: 2019-01-28 | End: 2019-01-29

## 2019-01-28 RX ORDER — HYDROMORPHONE HYDROCHLORIDE 1 MG/ML
0.2 INJECTION, SOLUTION INTRAMUSCULAR; INTRAVENOUS; SUBCUTANEOUS EVERY 2 HOUR PRN
Status: DISCONTINUED | OUTPATIENT
Start: 2019-01-28 | End: 2019-01-30

## 2019-01-28 RX ORDER — POLYETHYLENE GLYCOL 3350 17 G/17G
17 POWDER, FOR SOLUTION ORAL DAILY PRN
Status: DISCONTINUED | OUTPATIENT
Start: 2019-01-28 | End: 2019-01-30

## 2019-01-28 RX ORDER — SODIUM CHLORIDE, SODIUM LACTATE, POTASSIUM CHLORIDE, CALCIUM CHLORIDE 600; 310; 30; 20 MG/100ML; MG/100ML; MG/100ML; MG/100ML
INJECTION, SOLUTION INTRAVENOUS CONTINUOUS
Status: DISCONTINUED | OUTPATIENT
Start: 2019-01-28 | End: 2019-01-29

## 2019-01-28 RX ORDER — ATORVASTATIN CALCIUM 10 MG/1
10 TABLET, FILM COATED ORAL NIGHTLY
COMMUNITY
End: 2019-07-03

## 2019-01-28 RX ORDER — DIGOXIN 125 MCG
125 TABLET ORAL DAILY
COMMUNITY
End: 2019-08-15

## 2019-01-28 RX ORDER — HYDROMORPHONE HYDROCHLORIDE 1 MG/ML
0.8 INJECTION, SOLUTION INTRAMUSCULAR; INTRAVENOUS; SUBCUTANEOUS EVERY 2 HOUR PRN
Status: DISCONTINUED | OUTPATIENT
Start: 2019-01-28 | End: 2019-01-29

## 2019-01-28 RX ORDER — CLINDAMYCIN PHOSPHATE 900 MG/50ML
900 INJECTION INTRAVENOUS EVERY 8 HOURS
Status: COMPLETED | OUTPATIENT
Start: 2019-01-28 | End: 2019-01-29

## 2019-01-28 RX ORDER — DIPHENHYDRAMINE HCL 25 MG
25 CAPSULE ORAL EVERY 4 HOURS PRN
Status: DISCONTINUED | OUTPATIENT
Start: 2019-01-28 | End: 2019-01-29

## 2019-01-28 RX ORDER — DIPHENHYDRAMINE HYDROCHLORIDE 50 MG/ML
25 INJECTION INTRAMUSCULAR; INTRAVENOUS ONCE AS NEEDED
Status: ACTIVE | OUTPATIENT
Start: 2019-01-28 | End: 2019-01-28

## 2019-01-28 RX ORDER — LOSARTAN POTASSIUM 25 MG/1
25 TABLET ORAL EVERY EVENING
COMMUNITY
End: 2019-08-15

## 2019-01-28 RX ORDER — SODIUM CHLORIDE 9 MG/ML
INJECTION, SOLUTION INTRAVENOUS CONTINUOUS
Status: DISCONTINUED | OUTPATIENT
Start: 2019-01-28 | End: 2019-01-30

## 2019-01-28 RX ORDER — INSULIN ASPART 100 [IU]/ML
INJECTION, SOLUTION INTRAVENOUS; SUBCUTANEOUS ONCE
Status: DISCONTINUED | OUTPATIENT
Start: 2019-01-28 | End: 2019-01-28 | Stop reason: HOSPADM

## 2019-01-28 RX ORDER — ONDANSETRON 2 MG/ML
4 INJECTION INTRAMUSCULAR; INTRAVENOUS EVERY 4 HOURS PRN
Status: DISCONTINUED | OUTPATIENT
Start: 2019-01-28 | End: 2019-01-30

## 2019-01-28 RX ORDER — DOCUSATE SODIUM 100 MG/1
100 CAPSULE, LIQUID FILLED ORAL 2 TIMES DAILY
Qty: 60 CAPSULE | Refills: 0 | Status: SHIPPED | OUTPATIENT
Start: 2019-01-28 | End: 2019-02-12

## 2019-01-28 RX ORDER — ACETAMINOPHEN 325 MG/1
650 TABLET ORAL 4 TIMES DAILY
Status: DISCONTINUED | OUTPATIENT
Start: 2019-01-28 | End: 2019-01-29

## 2019-01-28 RX ORDER — ATORVASTATIN CALCIUM 10 MG/1
10 TABLET, FILM COATED ORAL NIGHTLY
Status: DISCONTINUED | OUTPATIENT
Start: 2019-01-28 | End: 2019-01-30

## 2019-01-28 RX ORDER — KETOROLAC TROMETHAMINE 5 MG/ML
1 SOLUTION OPHTHALMIC 2 TIMES DAILY
COMMUNITY

## 2019-01-28 RX ORDER — DEXTROSE MONOHYDRATE 25 G/50ML
50 INJECTION, SOLUTION INTRAVENOUS
Status: DISCONTINUED | OUTPATIENT
Start: 2019-01-28 | End: 2019-01-28 | Stop reason: HOSPADM

## 2019-01-28 RX ORDER — FERROUS SULFATE 300 MG/5ML
300 LIQUID (ML) ORAL
Status: DISCONTINUED | OUTPATIENT
Start: 2019-01-29 | End: 2019-01-30

## 2019-01-28 RX ORDER — TIZANIDINE 2 MG/1
2 TABLET ORAL EVERY 6 HOURS PRN
Qty: 60 TABLET | Refills: 0 | Status: SHIPPED | OUTPATIENT
Start: 2019-01-28 | End: 2019-02-12

## 2019-01-28 RX ORDER — CLINDAMYCIN PHOSPHATE 900 MG/50ML
900 INJECTION INTRAVENOUS ONCE
Status: COMPLETED | OUTPATIENT
Start: 2019-01-28 | End: 2019-01-28

## 2019-01-28 RX ORDER — METOCLOPRAMIDE HYDROCHLORIDE 5 MG/ML
5 INJECTION INTRAMUSCULAR; INTRAVENOUS EVERY 6 HOURS PRN
Status: DISCONTINUED | OUTPATIENT
Start: 2019-01-28 | End: 2019-01-30

## 2019-01-28 RX ORDER — ONDANSETRON 2 MG/ML
4 INJECTION INTRAMUSCULAR; INTRAVENOUS AS NEEDED
Status: DISCONTINUED | OUTPATIENT
Start: 2019-01-28 | End: 2019-01-28 | Stop reason: HOSPADM

## 2019-01-28 RX ORDER — WARFARIN SODIUM 5 MG/1
5 TABLET ORAL ONCE
Status: COMPLETED | OUTPATIENT
Start: 2019-01-28 | End: 2019-01-28

## 2019-01-28 RX ORDER — ACETAMINOPHEN 500 MG
1000 TABLET ORAL ONCE
Status: DISCONTINUED | OUTPATIENT
Start: 2019-01-28 | End: 2019-01-28 | Stop reason: HOSPADM

## 2019-01-28 RX ORDER — OXYCODONE HYDROCHLORIDE 15 MG/1
15 TABLET ORAL EVERY 4 HOURS PRN
Status: DISCONTINUED | OUTPATIENT
Start: 2019-01-28 | End: 2019-01-29

## 2019-01-28 RX ORDER — OXYCODONE HYDROCHLORIDE 10 MG/1
10 TABLET ORAL EVERY 4 HOURS PRN
Status: DISCONTINUED | OUTPATIENT
Start: 2019-01-28 | End: 2019-01-29

## 2019-01-28 RX ORDER — HYDROMORPHONE HYDROCHLORIDE 1 MG/ML
0.4 INJECTION, SOLUTION INTRAMUSCULAR; INTRAVENOUS; SUBCUTANEOUS EVERY 5 MIN PRN
Status: DISCONTINUED | OUTPATIENT
Start: 2019-01-28 | End: 2019-01-28 | Stop reason: HOSPADM

## 2019-01-28 RX ORDER — KETOROLAC TROMETHAMINE 15 MG/ML
15 INJECTION, SOLUTION INTRAMUSCULAR; INTRAVENOUS EVERY 6 HOURS
Status: COMPLETED | OUTPATIENT
Start: 2019-01-28 | End: 2019-01-29

## 2019-01-28 RX ORDER — NALOXONE HYDROCHLORIDE 0.4 MG/ML
80 INJECTION, SOLUTION INTRAMUSCULAR; INTRAVENOUS; SUBCUTANEOUS AS NEEDED
Status: DISCONTINUED | OUTPATIENT
Start: 2019-01-28 | End: 2019-01-28 | Stop reason: HOSPADM

## 2019-01-28 RX ORDER — HYDROMORPHONE HYDROCHLORIDE 1 MG/ML
0.4 INJECTION, SOLUTION INTRAMUSCULAR; INTRAVENOUS; SUBCUTANEOUS EVERY 2 HOUR PRN
Status: DISCONTINUED | OUTPATIENT
Start: 2019-01-28 | End: 2019-01-29

## 2019-01-28 RX ORDER — SODIUM CHLORIDE, SODIUM LACTATE, POTASSIUM CHLORIDE, CALCIUM CHLORIDE 600; 310; 30; 20 MG/100ML; MG/100ML; MG/100ML; MG/100ML
INJECTION, SOLUTION INTRAVENOUS CONTINUOUS
Status: DISCONTINUED | OUTPATIENT
Start: 2019-01-28 | End: 2019-01-28 | Stop reason: HOSPADM

## 2019-01-28 RX ORDER — CELECOXIB 200 MG/1
200 CAPSULE ORAL DAILY
Qty: 30 CAPSULE | Refills: 0 | Status: SHIPPED | OUTPATIENT
Start: 2019-01-28 | End: 2019-02-12

## 2019-01-28 RX ORDER — ACETAMINOPHEN 325 MG/1
TABLET ORAL
Status: COMPLETED
Start: 2019-01-28 | End: 2019-01-28

## 2019-01-28 RX ORDER — LABETALOL HYDROCHLORIDE 5 MG/ML
5 INJECTION, SOLUTION INTRAVENOUS EVERY 5 MIN PRN
Status: DISCONTINUED | OUTPATIENT
Start: 2019-01-28 | End: 2019-01-28 | Stop reason: HOSPADM

## 2019-01-28 RX ORDER — TRAMADOL HYDROCHLORIDE 50 MG/1
50 TABLET ORAL EVERY 12 HOURS
Status: DISCONTINUED | OUTPATIENT
Start: 2019-01-28 | End: 2019-01-30

## 2019-01-28 RX ORDER — SODIUM PHOSPHATE, DIBASIC AND SODIUM PHOSPHATE, MONOBASIC 7; 19 G/133ML; G/133ML
1 ENEMA RECTAL ONCE AS NEEDED
Status: DISCONTINUED | OUTPATIENT
Start: 2019-01-28 | End: 2019-01-30

## 2019-01-28 RX ORDER — OXYCODONE HYDROCHLORIDE AND ACETAMINOPHEN 5; 325 MG/1; MG/1
1 TABLET ORAL EVERY 4 HOURS PRN
Qty: 80 TABLET | Refills: 0 | Status: SHIPPED | OUTPATIENT
Start: 2019-01-28 | End: 2019-01-30

## 2019-01-28 RX ORDER — KETOROLAC TROMETHAMINE 5 MG/ML
1 SOLUTION OPHTHALMIC 2 TIMES DAILY
Status: DISCONTINUED | OUTPATIENT
Start: 2019-01-28 | End: 2019-01-30

## 2019-01-28 NOTE — ANESTHESIA POSTPROCEDURE EVALUATION
[facility] Patient Status:  Surgery Admit   Age/Gender 80year old female MRN [id number]   [facility] SURGERY Attending René Ley MD   [address] Day # 0 PCP Laurita Mckoy MD       Anesthesia Post-op Note    Proce

## 2019-01-28 NOTE — ANESTHESIA PREPROCEDURE EVALUATION
PRE-OP EVALUATION    Patient Name: Rekha Huang    Pre-op Diagnosis: Primary osteoarthritis of right knee [M17.11]    Procedure(s):  RIGHT TOTAL KNEE REPLACEMENT    Surgeon(s) and Role:     Jeovany Qureshi MD - Primary    Pre-op vitals reviewed.   Temp: 9 Disp: 90 tablet Rfl: 3   Cholecalciferol (VITAMIN D) 1000 UNITS Oral Tab Take 1,000 Units by mouth daily. Disp:  Rfl:        Allergies: Amoxicillin-Fd&C Red #40-Na Benzoate; Nitrofurantoin      Anesthesia Evaluation    Patient summary reviewed.     Anesth Essential hypertension     Long term (current) use of anticoagulants     Nonspecific abnormal electrocardiogram (ECG) (EKG)     Mixed hyperlipidemia     Chronic cough     Primary osteoarthritis of right knee          Past Surgical History:   Procedure Late with: patient    Consented to blood products.           Present on Admission:  **None**

## 2019-01-28 NOTE — H&P
Brittney Higgins   1/14/2019 4:40 PM   Office Visit   MRN:  [id number]   Description: 80year old female Provider: Kerline Newton MD Department: [facility] [place]   Scanning Cover Sheet     Click to print [address] for scanning   Off Dorzolamide HCl-Timolol Mal 22.3-6.8 MG/ML Ophthalmic Solution Apply 2 drops to eye 2 (two) times daily. Disp:  Rfl: 2   Glucose Blood (ONETOUCH ULTRA BLUE) In Vitro Strip Use as directed.  Disp: 100 strip Rfl: 1   Ketorolac Tromethamine (ACULAR) 0.4 % Opht • COLONOSCOPY N/A 5/30/2013     Performed by Lorrie Espinal MD at [address] [facility] [address]       • FOOT/TOES SURGERY [facility]   [address]     left   • TOTAL KNEE REPLACEMENT   2002/03     left               Family History   Problem Relati SKIN: no rashes,no suspicious lesions  HEENT: atraumatic, normocephalic,ears and throat are clear  EYES:PERRLA, EOMI, normal optic disk,conjunctiva are clear  NECK: supple,no adenopathy,no bruits  LUNGS: clear to auscultation  CARDIO: RRR without murmur  G Patient’s diagnosis and treatment options were discussed. Conservative care vs surgical intervention including joint replacement options were discussed. Extensive need for physical therapy after surgery emphasized.   Hospital course, recovery process, exp

## 2019-01-28 NOTE — OPERATIVE REPORT
TOTAL KNEE OPERATIVE REPORT:  Yasmin Montgomery       [id number]     4/12/1934    PREOP DX: RIGHT  KNEE PRIMARY OSTEOARTHRITIS  POSTOP DX:RIGHT KNEE PRIMARY OSTEOARTHRITIS  PROCEDURE: RIGHT TOTAL KNEE REPLACEMENT  SURGEON: MD FIRST Mireya Bradley FEMUR WAS FINISHED OFF WITH CHAMFER AND [address] CUTS IN USUAL FASHION. POSTERIOR FEMORAL OSTEOPHYTES WERE REMOVED. POSTERIOR CAPSULAR RELEASE WAS DONE CAREFULLY. PROXIMAL TIBIA WAS EXPOSED.   TIBIA WAS SIZED at 3 AND ROTATION WAS SET USING MEDIAL 1/3 O

## 2019-01-29 LAB
ERYTHROCYTE [DISTWIDTH] IN BLOOD BY AUTOMATED COUNT: 12.8 % (ref 11.5–16)
HCT VFR BLD AUTO: 30.3 % (ref 34–50)
HGB BLD-MCNC: 10.3 G/DL (ref 12–16)
INR BLD: 1.16 (ref 0.9–1.1)
MCH RBC QN AUTO: 29.1 PG (ref 27–33.2)
MCHC RBC AUTO-ENTMCNC: 34 G/DL (ref 31–37)
MCV RBC AUTO: 85.6 FL (ref 81–100)
PLATELET # BLD AUTO: 171 10(3)UL (ref 150–450)
PSA SERPL DL<=0.01 NG/ML-MCNC: 15.3 SECONDS (ref 12.4–14.7)
RBC # BLD AUTO: 3.54 X10(6)UL (ref 3.8–5.1)
RED CELL DISTRIBUTION WIDTH-SD: 40 FL (ref 35.1–46.3)
WBC # BLD AUTO: 5.6 X10(3) UL (ref 4–13)

## 2019-01-29 PROCEDURE — 99232 SBSQ HOSP IP/OBS MODERATE 35: CPT | Performed by: HOSPITALIST

## 2019-01-29 RX ORDER — ACETAMINOPHEN 325 MG/1
650 TABLET ORAL EVERY 4 HOURS PRN
Status: DISCONTINUED | OUTPATIENT
Start: 2019-01-29 | End: 2019-01-30

## 2019-01-29 RX ORDER — HYDROCODONE BITARTRATE AND ACETAMINOPHEN 5; 325 MG/1; MG/1
2 TABLET ORAL EVERY 4 HOURS PRN
Status: DISCONTINUED | OUTPATIENT
Start: 2019-01-29 | End: 2019-01-30

## 2019-01-29 RX ORDER — HYDROCODONE BITARTRATE AND ACETAMINOPHEN 5; 325 MG/1; MG/1
1 TABLET ORAL EVERY 4 HOURS PRN
Status: DISCONTINUED | OUTPATIENT
Start: 2019-01-29 | End: 2019-01-30

## 2019-01-29 RX ORDER — LOSARTAN POTASSIUM 25 MG/1
25 TABLET ORAL EVERY EVENING
Status: DISCONTINUED | OUTPATIENT
Start: 2019-01-29 | End: 2019-01-30

## 2019-01-29 NOTE — PLAN OF CARE
Paged Dr. Rochelle Pascual regarding lovenox bridge while restarting coumadin. Will monitor. 1849: Discussed with Dr. Rochelle Pascual. No lovenox. Allow INR to drift up with coumadin. Will monitor.

## 2019-01-29 NOTE — OCCUPATIONAL THERAPY NOTE
OCCUPATIONAL THERAPY QUICK EVALUATION - INPATIENT    Room Number: 374/374-A  Evaluation Date: 1/29/2019     Type of Evaluation: Quick Eval  Presenting Problem: right knee DJD s/p TKA    Physician Order: IP Consult to Occupational Therapy  Reason for [facility] 1990's    left   • TOTAL KNEE REPLACEMENT  2002/03    left       OCCUPATIONAL PROFILE    HOME SITUATION  Type of Home: Apartment  Home Layout: One level  Lives With: Alone(reports she has 4 sons who will be able to assist as needed.)    Toilet and Equipmen ADLs and self care transfers. Pt performed bed mobility independently, sit to stand Mod Ind with RW.  Pt performed LE dressing eated Mod Ind including pants/underwear, demonstrated adequate reach to feet for donning/doffing socks, pt used RW for standing po independently  Patient/Caregiver able to demonstrate safety with ADLS: safely and independently   Pt to perform bed mobility safely and independently

## 2019-01-29 NOTE — CONSULTS
[facility] Cardiology Consultation    Tariq Al Patient Status:  Inpatient    1934 MRN [id number]   [facility] 3SW-A Attending Rashmi Beckett MD   [address] Day # 1 PCP Rebeka Galvan MD     Reason for Consultation:  Post TKA, Afib of Onset   • Other (bladder cancer) Father [de-identified]   • Other (colon cancer) Mother 80   • Breast Cancer Other         niece and herman   • Other (DM) Other    • Heart Disease Brother    • Other (tobacco use,PAF) Sister    • Other (MI,) Brother 46         Allergi 5. 6   HGB  10.3*   PLT  171.0       Chem:  No results for input(s): NA, K, CL, CO2, BUN, CREATSERUM, CA, CAION, MG, PHOS, GLU in the last 168 hours. No results for input(s): ALT, AST, ALB, AMYLASE, LIPASE, LDH in the last 168 hours.     Invalid input(s):

## 2019-01-29 NOTE — PROGRESS NOTES
[facility] HOSPITAL[facility]  Progress Note     Metta Catching Patient Status:  Inpatient    1934 MRN [id number]   [facility] 3SW-A Attending Shivani Ramires MD   Hosp Day # 1 PCP Jacqui Pham MD     Chief Complaint: knee pain    S: Lorri Sandhoff Nightly   • cholecalciferol  1,000 Units Oral Daily   • digoxin  125 mcg Oral Daily   • Dorzolamide HCl-Timolol Mal  1 drop Right Eye BID   • ketorolac tromethamine  1 drop Right Eye BID   • metoprolol Tartrate  50 mg Oral 2x Daily(Beta Blocker)       NEFTALI

## 2019-01-29 NOTE — PLAN OF CARE
Refusing 1300 therapy session, states is \"too tired\", she is drowsy/stays awake during conversation. Son at bedside agrees. RN encouraged patient to go and do her best, continues to decline.

## 2019-01-29 NOTE — PHYSICAL THERAPY NOTE
PHYSICAL THERAPY KNEE TREATMENT NOTE - INPATIENT     Room Number: 374/374-A     Session:1&2   Number of Visits to Meet Established Goals: 3    Presenting Problem: s/p right TKA  1/28/19    Problem List  Active Problems:    Paroxysmal atrial fibrillation ( Restriction: R lower extremity        R Lower Extremity: Weight Bearing as Tolerated       PAIN ASSESSMENT   Ratin  Location: R knee  Management Techniques:  Activity promotion    BALANCE  Static Sitting: Good  Dynamic Sitting: Good  Static Standing: Po Pt remained sleepy throughout session, verbal stimulation provided to keep pt awake throughout session. Pt shows improved tolerance to increased repetitions of exercises with some assist and cues for technique.  Pt performed gait training, ambulated with R Good  Frequency (Obs): Daily    CURRENT GOALS    Goal #1     Patient is able to demonstrate supine - sit EOB @ level: supervision, met 1/29/19    Goal #2     Patient is able to demonstrate transfers Sit to/from Stand at assistance level: supervison  Met

## 2019-01-29 NOTE — PHYSICAL THERAPY NOTE
PHYSICAL THERAPY KNEE EVALUATION - INPATIENT     Room Number: 374/374-A  Evaluation Date: 1/29/2019  Type of Evaluation: Initial  Physician Order: PT Eval and Treat    Presenting Problem: s/p right TKA  1/28/19  Reason for Therapy: Mobility Dysfunction and Enter : 0     Stairs to Bedroom: 0       Lives With: Alone(reports she has 4 sons who will be able to assist as needed.)  Drives: Yes  Patient Owned Equipment: Rolling walker  Patient Regularly Uses: Glasses    Prior Level of Loving: Pt reports ind p bed to a chair (including a wheelchair)?: A Little   -   Need to walk in hospital room?: A Little   -   Climbing 3-5 steps with a railing?: A Little       AM-PAC Score:  Raw Score: 20   Approx Degree of Impairment: 35.83%   Standardized Score (AM-PAC Scale mobility. Research supports that patients with this level of impairment may benefit from home with home PT, pt reports her sons will be assisting her as needed, pt lives in first floor apartment and was ind pta.   Based on this evaluation, patient's clinica

## 2019-01-29 NOTE — PROGRESS NOTES
Orthopedic surgery progress note    Nickolas Soni Patient Status:  Inpatient    1934 MRN [id number]   [facility] 3SW-A Attending Davey Lord MD   Hosp Day # 1 PCP Frank Gonzáles MD       Subjective:  No major complaints.   No

## 2019-01-29 NOTE — PLAN OF CARE
Pt arrived from PACU on bed. Family at bedside. VSS. Denies pain or nausea. Oriented to room and call light system. Educated on fall precautions and IS use. Notified Dr. Sally Mckeon of new pt. Paged Dr. Umesh Diaz for new consult. Will monitor.     1851: Dr. Syd Ordaz

## 2019-01-29 NOTE — PROGRESS NOTES
(son) attended group discharge education class. Discharge education provided utilizing \"hip/knee replacement discharge instructions\" sheet. Teach back done. Questions solicited and answered.

## 2019-01-29 NOTE — PROGRESS NOTES
[facility] Pharmacy Note:  Renal Dose Adjustment for Metoclopramide (REGLAN)    Rekha Huang has been prescribed Metoclopramide (REGLAN) 10 mg every 6 hours as needed for nausea, vomiting.     CrCl estimated at approximately 35 ml/min (based on minimum Scr =0.8

## 2019-01-29 NOTE — CM/SW NOTE
Order received for d/c planning post op. Right TKA 1/28. PT rec HHPT. Informed by PT that pt initially wanted to go to [address] [place] [address],[address] but feels comfortable with plan for HHPT. She lives alone in a one level apt but her 4 sons will take turns staying with her.

## 2019-01-30 VITALS
SYSTOLIC BLOOD PRESSURE: 128 MMHG | HEART RATE: 67 BPM | BODY MASS INDEX: 28.44 KG/M2 | OXYGEN SATURATION: 97 % | WEIGHT: 130 LBS | DIASTOLIC BLOOD PRESSURE: 58 MMHG | TEMPERATURE: 98 F | HEIGHT: 56.5 IN | RESPIRATION RATE: 18 BRPM

## 2019-01-30 LAB
DEPRECATED RDW RBC AUTO: 40.4 FL (ref 35.1–46.3)
ERYTHROCYTE [DISTWIDTH] IN BLOOD BY AUTOMATED COUNT: 13.2 % (ref 11–15)
HCT VFR BLD AUTO: 33.1 % (ref 35–48)
HGB BLD-MCNC: 11.1 G/DL (ref 12–16)
INR BLD: 1.46 (ref 0.9–1.1)
MCH RBC QN AUTO: 28.5 PG (ref 26–34)
MCHC RBC AUTO-ENTMCNC: 33.5 G/DL (ref 31–37)
MCV RBC AUTO: 84.9 FL (ref 80–100)
PLATELET # BLD AUTO: 213 10(3)UL (ref 150–450)
PSA SERPL DL<=0.01 NG/ML-MCNC: 18.3 SECONDS (ref 12.4–14.7)
RBC # BLD AUTO: 3.9 X10(6)UL (ref 3.8–5.3)
WBC # BLD AUTO: 8 X10(3) UL (ref 4–11)

## 2019-01-30 RX ORDER — CALCIUM CARBONATE 200(500)MG
500 TABLET,CHEWABLE ORAL
Status: DISCONTINUED | OUTPATIENT
Start: 2019-01-30 | End: 2019-01-30

## 2019-01-30 RX ORDER — WARFARIN SODIUM 5 MG/1
5 TABLET ORAL NIGHTLY
Status: DISCONTINUED | OUTPATIENT
Start: 2019-01-30 | End: 2019-01-30

## 2019-01-30 RX ORDER — HYDROCODONE BITARTRATE AND ACETAMINOPHEN 5; 325 MG/1; MG/1
1 TABLET ORAL EVERY 4 HOURS PRN
Qty: 60 TABLET | Refills: 0 | Status: SHIPPED | OUTPATIENT
Start: 2019-01-30 | End: 2019-05-14 | Stop reason: ALTCHOICE

## 2019-01-30 RX ORDER — WARFARIN SODIUM 2 MG/1
2 TABLET ORAL ONCE
Status: COMPLETED | OUTPATIENT
Start: 2019-01-30 | End: 2019-01-30

## 2019-01-30 NOTE — PROGRESS NOTES
Orthopedic surgery progress note    Abeba Harrison Patient Status:  Inpatient    1934 MRN [id number]   [facility] 3SW-A Attending Rowan Boast, MD   Hosp Day # 2 PCP Yvon Cedillo MD       Subjective:  Doing better than jennifer

## 2019-01-30 NOTE — PLAN OF CARE
NURSING DISCHARGE NOTE    Discharged Home via Wheelchair. Accompanied by Family member  Belongings Taken by patient/family. PAtient and son watched discharge video, son attended discharge class yesterday.  REviewed all discharge instruction, they ve

## 2019-01-30 NOTE — PHYSICAL THERAPY NOTE
PHYSICAL THERAPY KNEE TREATMENT NOTE - INPATIENT     Room Number: 374/374-A     Session  3                                 :Number of Visits to Meet Established Goals: 3    Presenting Problem: s/p right TKA  1/28/19    Problem List  Active Problems:    Pa Restriction: R lower extremity        R Lower Extremity: Weight Bearing as Tolerated       PAIN ASSESSMENT   Ratin  Location: R knee  Management Techniques:  Activity promotion    BALANCE  Static Sitting: Good  Dynamic Sitting: Good  Static Standing: Po reps   Hip Abd/Add 20 reps   Heel slides 20 reps   Saq 20 reps   SLR 20 reps   Sitting Knee Flexion 20 reps   Standing heel/toe raises 20 reps   Standing knee flexion 20 reps   Extension stretch  1x             Comments: Pt participated in group session, t

## 2019-01-30 NOTE — PROGRESS NOTES
Acute Pain Service    Post Op Day 2 Ortho Note    Assessed patient in chair. Patient states Remington Cage is working well to manage pain; denies itching/nausea/dizziness. Patient able to bear weight on sx leg; equal sensation in BLE.  No further recommendations

## 2019-01-30 NOTE — CM/SW NOTE
01/30/19 1348   Discharge disposition   Expected discharge disposition Home-Health   Name of Facillity/Home Care/Hospice Residential   Discharge transportation Private car

## 2019-01-30 NOTE — PROGRESS NOTES
[facility] Cardiology Progress Note        Jinx Orf Patient Status:  Inpatient    1934 MRN [id number]   [facility] 3SW-A Attending Rand Connor MD   Hosp Day # 2 PCP Joya Jolley MD     Subjective results for input(s): NA, K, CL, CO2, BUN, CREATSERUM, CA, CAION, MG, PHOS, GLU in the last 168 hours. No results for input(s): ALT, AST, ALB, AMYLASE, LIPASE, LDH in the last 168 hours.     Invalid input(s): ALPHOS, TBIL, DBIL, TPROT    No results for i

## 2019-01-30 NOTE — CM/SW NOTE
Notifeid Kassidy Garcia with [address] that pt likely to discharge later today.      Venancio Chacon RN, [facility]  Spectra [phone]

## 2019-01-31 ENCOUNTER — PATIENT OUTREACH (OUTPATIENT)
Dept: CASE MANAGEMENT | Age: 84
End: 2019-01-31

## 2019-01-31 DIAGNOSIS — M17.11 PRIMARY OSTEOARTHRITIS OF RIGHT KNEE: ICD-10-CM

## 2019-01-31 DIAGNOSIS — Z02.9 ENCOUNTERS FOR UNSPECIFIED ADMINISTRATIVE PURPOSE: ICD-10-CM

## 2019-01-31 PROCEDURE — 1111F DSCHRG MED/CURRENT MED MERGE: CPT

## 2019-01-31 NOTE — PROGRESS NOTES
Initial Post Discharge Follow Up   Discharge Date: 1/30/19  Contact Date: 1/31/2019    Consent Verification:  Assessment Completed With: Patient  HIPAA Verified?   Yes    Discharge Dx:  S/p right TKA    General:   • How have you been since your discharge mouth every evening. Disp:  Rfl:    ketorolac tromethamine 0.5 % Ophthalmic Solution Place 1 drop into the right eye 2 (two) times daily. 6AM AND 6PM. Disp:  Rfl:    Warfarin Sodium 5 MG Oral Tab Take by mouth See Admin Instructions.  TAKE 1 TABLET BY MOUTH any concerns with constipation? No    Referrals/orders at D/C:  Home Health ordered at D/C? Yes   Has HH been set up? Yes   If Yes: With Whom:  [facility]   When:  Pt states her son is waiting for a call back from [facility] RN and PT to schedule [facility] visit.      DME ord CST MEDICARE REHAB with Lynnette Arnold, PT [facility] ([facility]issa at [facility])    Mar 01, 2019 12:00 PM CST MEDICARE REHAB with Piotr Matthew PTA [facility] (Melissa menendez with Cristofer Fuchs PTA [address] [facility] Melissa at [facility] )    May 16, 2019  1:45 PM CDT EXAM-NEW PATIENT with Sara Wade  N [facility], [address] [place] [address] (Melissa at [address] [place], [place] [address]vd

## 2019-02-12 ENCOUNTER — OFFICE VISIT (OUTPATIENT)
Dept: INTERNAL MEDICINE CLINIC | Facility: CLINIC | Age: 84
End: 2019-02-12
Payer: MEDICARE

## 2019-02-12 VITALS
RESPIRATION RATE: 16 BRPM | SYSTOLIC BLOOD PRESSURE: 102 MMHG | OXYGEN SATURATION: 96 % | BODY MASS INDEX: 27.45 KG/M2 | WEIGHT: 129 LBS | HEIGHT: 57.5 IN | HEART RATE: 55 BPM | DIASTOLIC BLOOD PRESSURE: 58 MMHG | TEMPERATURE: 98 F

## 2019-02-12 DIAGNOSIS — D62 ACUTE BLOOD LOSS ANEMIA: ICD-10-CM

## 2019-02-12 DIAGNOSIS — I10 ESSENTIAL HYPERTENSION: ICD-10-CM

## 2019-02-12 DIAGNOSIS — Z09 HOSPITAL DISCHARGE FOLLOW-UP: Primary | ICD-10-CM

## 2019-02-12 DIAGNOSIS — M17.11 PRIMARY OSTEOARTHRITIS OF RIGHT KNEE: ICD-10-CM

## 2019-02-12 PROCEDURE — 99213 OFFICE O/P EST LOW 20 MIN: CPT | Performed by: INTERNAL MEDICINE

## 2019-02-12 PROCEDURE — 1111F DSCHRG MED/CURRENT MED MERGE: CPT | Performed by: INTERNAL MEDICINE

## 2019-02-12 NOTE — PROGRESS NOTES
Dayron Peace is a 80year old female.  To F/U from last visit regarding right knee replacement  HPI:   admtted January 28, d/c January 31 uncomplicated, went home, help of her sons  No c/o  Using several norco daily and in fact ordered more today, has us Social History:  Social History    Tobacco Use      Smoking status: Never Smoker      Smokeless tobacco: Never Used    Alcohol use:  Yes      Alcohol/week: 0.0 - 0.5 oz      Comment: socially    Drug use: No     Patient Active Problem List:     Cerv CREATSERUM 0.74 01/21/2019    BUNCREA 12.2 01/21/2019    ANIONGAP 6 01/21/2019    GFRAA 86 01/21/2019    GFRNAA 75 01/21/2019    CA 8.8 01/21/2019     01/21/2019    K 4.0 01/21/2019     01/21/2019    CO2 29.0 01/21/2019    OSMOCALC 287 01/21/20

## 2019-02-15 PROBLEM — Z96.651 STATUS POST TOTAL RIGHT KNEE REPLACEMENT: Status: ACTIVE | Noted: 2019-02-15

## 2019-02-15 NOTE — DISCHARGE SUMMARY
Discharge Summary  Patient ID:  Jerrod Lloyd  [id number]  80year old  4/12/1934    Admit date: 1/28/2019    Discharge date and time: 1/30/19    Attending Physician: No att. providers found     Reason for admission: right knee primary OA    Discharge Iveth Tab  Take 125 mcg by mouth daily. , Historical    Losartan Potassium 25 MG Oral Tab  Take 25 mg by mouth every evening., Historical    ketorolac tromethamine 0.5 % Ophthalmic Solution  Place 1 drop into the right eye 2 (two) times daily.  6AM AND 6PM., Histo

## 2019-08-01 PROBLEM — M19.072 OSTEOARTHRITIS OF MIDTARSAL JOINT OF LEFT FOOT: Status: ACTIVE | Noted: 2019-08-01

## 2019-08-15 ENCOUNTER — OFFICE VISIT (OUTPATIENT)
Dept: INTERNAL MEDICINE CLINIC | Facility: CLINIC | Age: 84
End: 2019-08-15
Payer: MEDICARE

## 2019-08-15 ENCOUNTER — TELEPHONE (OUTPATIENT)
Dept: INTERNAL MEDICINE CLINIC | Facility: CLINIC | Age: 84
End: 2019-08-15

## 2019-08-15 VITALS
HEIGHT: 57.5 IN | OXYGEN SATURATION: 98 % | SYSTOLIC BLOOD PRESSURE: 118 MMHG | BODY MASS INDEX: 28.26 KG/M2 | DIASTOLIC BLOOD PRESSURE: 76 MMHG | HEART RATE: 85 BPM | WEIGHT: 132.81 LBS | RESPIRATION RATE: 14 BRPM | TEMPERATURE: 98 F

## 2019-08-15 DIAGNOSIS — N30.00 ACUTE CYSTITIS WITHOUT HEMATURIA: Primary | ICD-10-CM

## 2019-08-15 LAB
APPEARANCE: CLEAR
BILIRUBIN: NEGATIVE
GLUCOSE (URINE DIPSTICK): NEGATIVE MG/DL
KETONES (URINE DIPSTICK): NEGATIVE MG/DL
MULTISTIX LOT#: NORMAL NUMERIC
NITRITE, URINE: NEGATIVE
PH, URINE: 7 (ref 4.5–8)
PROTEIN (URINE DIPSTICK): NEGATIVE MG/DL
SPECIFIC GRAVITY: 1.01 (ref 1–1.03)
URINE-COLOR: YELLOW
UROBILINOGEN,SEMI-QN: 0.2 MG/DL (ref 0–1.9)

## 2019-08-15 PROCEDURE — 87086 URINE CULTURE/COLONY COUNT: CPT | Performed by: INTERNAL MEDICINE

## 2019-08-15 PROCEDURE — 81003 URINALYSIS AUTO W/O SCOPE: CPT | Performed by: INTERNAL MEDICINE

## 2019-08-15 PROCEDURE — 99213 OFFICE O/P EST LOW 20 MIN: CPT | Performed by: INTERNAL MEDICINE

## 2019-08-15 RX ORDER — SULFAMETHOXAZOLE AND TRIMETHOPRIM 800; 160 MG/1; MG/1
1 TABLET ORAL 2 TIMES DAILY
Qty: 14 TABLET | Refills: 0 | Status: SHIPPED | OUTPATIENT
Start: 2019-08-15 | End: 2019-09-30 | Stop reason: ALTCHOICE

## 2019-08-15 NOTE — PROGRESS NOTES
Ha Kelly is a 80year old female  Patient presents with:  Urinary Frequency  Burning On Urination  Body ache and/or chills: Chills      HPI:   Dysuria for a few days  Urgency, frequency, chills, no fever  No abd paink, flank pain or blood     Curren Essential hypertension     Long term (current) use of anticoagulants     Nonspecific abnormal electrocardiogram (ECG) (EKG)     Mixed hyperlipidemia     Chronic cough     Primary osteoarthritis of right knee     Status post total right knee replacement Color Urine YELLOW Yellow    Multistix Lot# [id number] Numeric    Multistix Expiration Date 11/30/2019 Date         ASSESSMENT AND PLAN:   Acute cystitis without hematuria  (primary encounter diagnosis) push fluids, abx, culture, call me if fever     Orders P

## 2019-08-15 NOTE — TELEPHONE ENCOUNTER
Pharmacy called advising interaction with newly prescribed Bactrim and pts warfarin, advised  likely already has reviewed this with pt however will follow up wit pt and  and call pharmacy back. Pt confirmed is still taking warfarin.      Dr Joel Quiet any c

## 2019-09-30 ENCOUNTER — OFFICE VISIT (OUTPATIENT)
Dept: INTERNAL MEDICINE CLINIC | Facility: CLINIC | Age: 84
End: 2019-09-30
Payer: MEDICARE

## 2019-09-30 VITALS
SYSTOLIC BLOOD PRESSURE: 124 MMHG | RESPIRATION RATE: 16 BRPM | HEIGHT: 58.03 IN | OXYGEN SATURATION: 99 % | WEIGHT: 131.38 LBS | BODY MASS INDEX: 27.58 KG/M2 | DIASTOLIC BLOOD PRESSURE: 62 MMHG | HEART RATE: 63 BPM

## 2019-09-30 DIAGNOSIS — N32.81 OAB (OVERACTIVE BLADDER): ICD-10-CM

## 2019-09-30 DIAGNOSIS — Z12.31 ENCOUNTER FOR SCREENING MAMMOGRAM FOR BREAST CANCER: Primary | ICD-10-CM

## 2019-09-30 DIAGNOSIS — E78.2 MIXED HYPERLIPIDEMIA: ICD-10-CM

## 2019-09-30 DIAGNOSIS — R73.02 IGT (IMPAIRED GLUCOSE TOLERANCE): ICD-10-CM

## 2019-09-30 DIAGNOSIS — I10 ESSENTIAL HYPERTENSION: ICD-10-CM

## 2019-09-30 DIAGNOSIS — R05.3 CHRONIC COUGH: ICD-10-CM

## 2019-09-30 DIAGNOSIS — J47.9 BRONCHIECTASIS WITHOUT COMPLICATION (HCC): ICD-10-CM

## 2019-09-30 DIAGNOSIS — Z00.00 ENCOUNTER FOR ANNUAL HEALTH EXAMINATION: ICD-10-CM

## 2019-09-30 PROCEDURE — 99214 OFFICE O/P EST MOD 30 MIN: CPT | Performed by: INTERNAL MEDICINE

## 2019-09-30 PROCEDURE — G0439 PPPS, SUBSEQ VISIT: HCPCS | Performed by: INTERNAL MEDICINE

## 2019-09-30 RX ORDER — PREDNISOLONE ACETATE 10 MG/ML
1 SUSPENSION/ DROPS OPHTHALMIC 2 TIMES DAILY
Refills: 1 | COMMUNITY
Start: 2019-09-25

## 2019-09-30 RX ORDER — ACETAMINOPHEN 160 MG
2000 TABLET,DISINTEGRATING ORAL DAILY
COMMUNITY

## 2019-09-30 NOTE — PATIENT INSTRUCTIONS
Gonzalez Amin's SCREENING SCHEDULE   Tests on this list are recommended by your physician but may not be covered, or covered at this frequency, by your insurer. Please check with your insurance carrier before scheduling to verify coverage.    PREVENTATI EKG is not a screening covered service except at the Welcome to Medicare Visit    Abdominal aortic aneurysm screening (once between ages 73-68) IPPE only No results found for this or any previous visit.  Limited to patients who meet one of the following cri Screening Mammogram      Mammogram    Recommend Annually to at least age 76, and as needed after 76 There are no preventive care reminders to display for this patient.  Please get this Mammogram regularly   Immunizations      Influenza  Covered Annually O and print using their home computer and printer. (the forms are also available in [address])  [email or web address] org  This link also has information from the [facility] regarding Advance Directives.

## 2019-09-30 NOTE — PROGRESS NOTES
HPI:   Felipe Gillespie is a 80year old female who presents for a Medicare Subsequent Annual Wellness visit (Pt already had Initial Annual Wellness). She also has IGT, HTN and high cholesterol and bronchiestasis.  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 Weight as of this encounter: 131 lb 6.4 oz.     Medicare Hearing Assessment  (Required for AWV/SWV)    Finger Rub reduced b/l, needs hearing aides       General appearance: alert, appears stated age and cooperative  Neck: no murphy aspirin therapy For the following reasons:   Already on other anticoagulant usage such as Plavix, Xarelto, Lovenox, or warfarin            Diet assessment: excellent     Advanced Directive:  Living Will on file in [place]?   Dayron Peace does not have a Jennifer accidently lose urine?: 1-Yes    Do you have difficulty seeing?: 0-No    Do you have any difficulty walking or getting up?: 0-No    Do you have any tripping hazards?: 0-No    Are you on multiple medications?: 1-Yes    Does pain affect your day to day activ every 10 years Colonoscopy due on 05/30/2023 Update [facility] if applicable    Flex Sigmoidoscopy Screen every 10 years No results found for this or any previous visit. No flowsheet data found.      Fecal Occult Blood Annually No results found for: Zoster  Not covered by Medicare Part B No vaccine history found This may be covered with your pharmacy  prescription benefits        SPECIFIC DISEASE MONITORING Internal Lab or Procedure External Lab or Procedure   Annual Monitoring of Persistent     Medic

## 2019-10-03 ENCOUNTER — OFFICE VISIT (OUTPATIENT)
Dept: INTERNAL MEDICINE CLINIC | Facility: CLINIC | Age: 84
End: 2019-10-03
Payer: MEDICARE

## 2019-10-03 VITALS
RESPIRATION RATE: 14 BRPM | BODY MASS INDEX: 27.5 KG/M2 | OXYGEN SATURATION: 97 % | HEART RATE: 65 BPM | WEIGHT: 131 LBS | SYSTOLIC BLOOD PRESSURE: 122 MMHG | DIASTOLIC BLOOD PRESSURE: 74 MMHG | TEMPERATURE: 98 F | HEIGHT: 58.03 IN

## 2019-10-03 DIAGNOSIS — R19.7 DIARRHEA, UNSPECIFIED TYPE: ICD-10-CM

## 2019-10-03 DIAGNOSIS — R31.29 MICROHEMATURIA: ICD-10-CM

## 2019-10-03 DIAGNOSIS — R39.9 UTI SYMPTOMS: Primary | ICD-10-CM

## 2019-10-03 DIAGNOSIS — N39.41 URGENCY INCONTINENCE: ICD-10-CM

## 2019-10-03 PROCEDURE — 81001 URINALYSIS AUTO W/SCOPE: CPT | Performed by: INTERNAL MEDICINE

## 2019-10-03 PROCEDURE — 81003 URINALYSIS AUTO W/O SCOPE: CPT | Performed by: INTERNAL MEDICINE

## 2019-10-03 PROCEDURE — 99214 OFFICE O/P EST MOD 30 MIN: CPT | Performed by: INTERNAL MEDICINE

## 2019-10-03 PROCEDURE — 87086 URINE CULTURE/COLONY COUNT: CPT | Performed by: INTERNAL MEDICINE

## 2019-10-03 RX ORDER — SULFAMETHOXAZOLE AND TRIMETHOPRIM 800; 160 MG/1; MG/1
1 TABLET ORAL 2 TIMES DAILY
Qty: 14 TABLET | Refills: 0 | Status: SHIPPED | OUTPATIENT
Start: 2019-10-03 | End: 2019-12-21 | Stop reason: ALTCHOICE

## 2019-10-03 NOTE — PROGRESS NOTES
Tammie Avitia is a 80year old female  Patient presents with:  Burning On Urination  Urinary Frequency  Body ache and/or chills: Chills      HPI:   Dysuria about every month lately but only lasting about a day  This was the worst but still self-resolved In Vitro Strip TEST BLOOD DAILY Disp: 50 strip Rfl: 0      Patient Active Problem List:     Cervical spondylosis     Paroxysmal atrial fibrillation (HCC)     Diastolic dysfunction     IGT (impaired glucose tolerance)     Mild aortic insufficiency     Hyper Negative    Ketones, UA NEGATIVE Negative mg/dL    Spec Gravity 1.020 1.005 - 1.030    Blood Urine MODERATE Negative    PH Urine 6.0 4.5 - 8.0    Protein Urine NEGATIVE Negative/Trace mg/dL    Urobilinogen Urine 0.2 0.0 - 1.9 mg/dL    Nitrite Urine NEGATIV

## 2019-10-04 DIAGNOSIS — R31.29 MICROHEMATURIA: Primary | ICD-10-CM

## 2019-10-04 DIAGNOSIS — R39.9 UTI SYMPTOMS: ICD-10-CM

## 2019-10-04 NOTE — PROGRESS NOTES
Spoke with Muna Humphries in lab. UA was not reflexed to culture because microscopy showed fewer than 5 WBCs, which is their criteria for reflex. Culture order added on to UA, Muna Humphries stated understanding.  Order entered as well with instructions to use sample collected f

## 2019-10-07 ENCOUNTER — TELEPHONE (OUTPATIENT)
Dept: INTERNAL MEDICINE CLINIC | Facility: CLINIC | Age: 84
End: 2019-10-07

## 2019-10-07 DIAGNOSIS — R39.9 UTI SYMPTOMS: ICD-10-CM

## 2019-10-07 DIAGNOSIS — R39.15 URGENCY OF URINATION: ICD-10-CM

## 2019-10-07 DIAGNOSIS — R31.29 MICROHEMATURIA: Primary | ICD-10-CM

## 2019-10-07 NOTE — PROGRESS NOTES
Pt called following up on UA results. Still pending cx. Will call pt back once receive final results.

## 2019-10-07 NOTE — PROGRESS NOTES
Spoke to pt, aware of results & recommendations. Pt voiced understanding. CT ABDOMEN(W+WO)PELVIS(CNTR[facility] ONLY) ordered; Provided Central Scheduling number  Urology referral to Dr. Marco A Hooker also placed; Provided referral information.

## 2019-10-16 ENCOUNTER — HOSPITAL ENCOUNTER (OUTPATIENT)
Dept: CT IMAGING | Facility: HOSPITAL | Age: 84
Discharge: HOME | End: 2019-10-16
Attending: INTERNAL MEDICINE
Payer: MEDICARE

## 2019-10-16 ENCOUNTER — TELEPHONE (OUTPATIENT)
Dept: INTERNAL MEDICINE CLINIC | Facility: CLINIC | Age: 84
End: 2019-10-16

## 2019-10-16 DIAGNOSIS — R39.15 URGENCY OF URINATION: ICD-10-CM

## 2019-10-16 DIAGNOSIS — R31.29 MICROHEMATURIA: ICD-10-CM

## 2019-10-16 PROCEDURE — 74178 CT ABD&PLV WO CNTR FLWD CNTR: CPT | Performed by: INTERNAL MEDICINE

## 2019-10-16 PROCEDURE — 82565 ASSAY OF CREATININE: CPT

## 2019-10-16 NOTE — TELEPHONE ENCOUNTER
Dariusz Mondragon from radiology CT calling. States radiologist recommends CT ABDOMEN(W+WO) PELVIS(W+WO). Current order is \"CT ABDOMEN(W+WO)PELVIS(CNTRST ONLY). Received verbal order from Dr. Ros Guillen, physician on call, okay to change as recommended.   Yessica Gu

## 2019-11-22 ENCOUNTER — LAB ENCOUNTER (OUTPATIENT)
Dept: LAB | Age: 84
End: 2019-11-22
Attending: INTERNAL MEDICINE
Payer: MEDICARE

## 2019-11-22 DIAGNOSIS — I10 ESSENTIAL HYPERTENSION: ICD-10-CM

## 2019-11-22 DIAGNOSIS — E78.2 MIXED HYPERLIPIDEMIA: ICD-10-CM

## 2019-11-22 DIAGNOSIS — R73.02 IGT (IMPAIRED GLUCOSE TOLERANCE): ICD-10-CM

## 2019-11-22 PROCEDURE — 80053 COMPREHEN METABOLIC PANEL: CPT

## 2019-11-22 PROCEDURE — 36415 COLL VENOUS BLD VENIPUNCTURE: CPT

## 2019-11-22 PROCEDURE — 83036 HEMOGLOBIN GLYCOSYLATED A1C: CPT

## 2019-11-22 PROCEDURE — 80061 LIPID PANEL: CPT

## 2019-12-21 ENCOUNTER — HOSPITAL ENCOUNTER (OUTPATIENT)
Age: 84
Discharge: HOME | End: 2019-12-21
Payer: MEDICARE

## 2019-12-21 VITALS
RESPIRATION RATE: 18 BRPM | DIASTOLIC BLOOD PRESSURE: 74 MMHG | HEIGHT: 58 IN | OXYGEN SATURATION: 100 % | BODY MASS INDEX: 26.24 KG/M2 | TEMPERATURE: 98 F | HEART RATE: 57 BPM | SYSTOLIC BLOOD PRESSURE: 162 MMHG | WEIGHT: 125 LBS

## 2019-12-21 DIAGNOSIS — N30.00 ACUTE CYSTITIS WITHOUT HEMATURIA: Primary | ICD-10-CM

## 2019-12-21 PROCEDURE — 81002 URINALYSIS NONAUTO W/O SCOPE: CPT | Performed by: FAMILY MEDICINE

## 2019-12-21 PROCEDURE — 87077 CULTURE AEROBIC IDENTIFY: CPT | Performed by: FAMILY MEDICINE

## 2019-12-21 PROCEDURE — 87186 SC STD MICRODIL/AGAR DIL: CPT | Performed by: FAMILY MEDICINE

## 2019-12-21 PROCEDURE — 99214 OFFICE O/P EST MOD 30 MIN: CPT

## 2019-12-21 PROCEDURE — 87086 URINE CULTURE/COLONY COUNT: CPT | Performed by: FAMILY MEDICINE

## 2019-12-21 RX ORDER — SULFAMETHOXAZOLE AND TRIMETHOPRIM 800; 160 MG/1; MG/1
1 TABLET ORAL 2 TIMES DAILY
Qty: 6 TABLET | Refills: 0 | Status: SHIPPED | OUTPATIENT
Start: 2019-12-21 | End: 2019-12-24

## 2019-12-21 NOTE — ED PROVIDER NOTES
Patient presents with:  Urinary Symptoms    HPI:     Janice Jarquin is a 80year old female who presents with suspected symptoms of UTI  Onset of symptoms: 6  days  Complains of:  - Dysuria: yes   - Frequency and  urgency of urination: yes   - Hesitancy: trachea midline  Back: symmetric, no curvature. ROM normal. No CVA tenderness. . +suprapubic tenderness  Lungs: clear to auscultation bilaterally  Heart: S1, S2 normal, no murmur, click, rub or gallop, regular rate and rhythm  Abdomen: soft, non-tender; bow in urine  Monitor urine output and fluid intake  Present to ED for any worsening abdominal pain, hematuria, difficulty urination, acute urinary retention, worsening flank pain, nausea, vomiting    All results reviewed and discussed with patient.   See AVS f

## 2019-12-30 ENCOUNTER — TELEPHONE (OUTPATIENT)
Dept: INTERNAL MEDICINE CLINIC | Facility: CLINIC | Age: 84
End: 2019-12-30

## 2019-12-30 NOTE — TELEPHONE ENCOUNTER
FYI - Patient went to the [facility] for a urine culture on 12/21/19. She finished the prescribed medication and feels better now.

## 2020-03-11 ENCOUNTER — TELEPHONE (OUTPATIENT)
Dept: INTERNAL MEDICINE CLINIC | Facility: CLINIC | Age: 85
End: 2020-03-11

## 2020-03-11 NOTE — TELEPHONE ENCOUNTER
Spoke to pt, she has a stuffy nose, cough with mucous, and now cough is dry at times. Pt denies traveling. Denies fever, SOB, difficulty breathing. OV made for pt tomorrow with NP. Advised pt if symptoms worsen she should go to ER. Pt agreeable. 77

## 2020-03-11 NOTE — TELEPHONE ENCOUNTER
Pt wondering if she should come in. I sent before I finished writing it all out.    Please advise   Thank you

## 2020-03-11 NOTE — TELEPHONE ENCOUNTER
Pt has had a cough for a long time , was a wet cough primarily but now has changed to both wet and dry cough. Pt states she has no fever sinus and nose ful. Pt has raspy voice due to cough. Pt states that the cough is seeming to be getting worse.    Please

## 2020-03-12 ENCOUNTER — OFFICE VISIT (OUTPATIENT)
Dept: INTERNAL MEDICINE CLINIC | Facility: CLINIC | Age: 85
End: 2020-03-12
Payer: MEDICARE

## 2020-03-12 VITALS
BODY MASS INDEX: 28.59 KG/M2 | TEMPERATURE: 98 F | SYSTOLIC BLOOD PRESSURE: 116 MMHG | HEART RATE: 67 BPM | RESPIRATION RATE: 16 BRPM | WEIGHT: 136.19 LBS | OXYGEN SATURATION: 98 % | DIASTOLIC BLOOD PRESSURE: 64 MMHG | HEIGHT: 58 IN

## 2020-03-12 DIAGNOSIS — R05.3 CHRONIC COUGH: Primary | ICD-10-CM

## 2020-03-12 DIAGNOSIS — J06.9 VIRAL UPPER RESPIRATORY TRACT INFECTION: ICD-10-CM

## 2020-03-12 PROCEDURE — 99213 OFFICE O/P EST LOW 20 MIN: CPT | Performed by: NURSE PRACTITIONER

## 2020-03-12 RX ORDER — BUDESONIDE AND FORMOTEROL FUMARATE DIHYDRATE 160; 4.5 UG/1; UG/1
2 AEROSOL RESPIRATORY (INHALATION) 2 TIMES DAILY
Qty: 1 INHALER | Refills: 0 | Status: SHIPPED | OUTPATIENT
Start: 2020-03-12 | End: 2020-12-10 | Stop reason: ALTCHOICE

## 2020-03-12 NOTE — PROGRESS NOTES
Omayra Escalona is a 80year old female. CHIEF COMPLAINT   Dry cough, nasal congestion      HPI:   The patient has a chronic cough.   She has taken inhalers for this in the past.  She also has chronic nasal congestion and is supposed to be taking Flonase, daily. 6AM AND 6PM.     • Meclizine HCl 12.5 MG Oral Tab Take 12.5 mg by mouth 3 (three) times daily as needed. • Dorzolamide HCl-Timolol Mal 22.3-6.8 MG/ML Ophthalmic Solution Place 1 drop into the right eye 2 (two) times daily.     2   • ACCU-CHEK NYA normocephalic, left ear clear, right TM not visualized due to debris in the canal, and throat are clear. She denies sinus tenderness.   EYES:PERRLA, EOMI,conjunctiva are clear  NECK: supple,no adenopathy  LUNGS: clear to auscultation; no rhonchi, rales, or taking it. Sinus tenderness on exam.  -The patient was provided a Symbicort sample and instructed to take 2 puffs twice a day for wheezing and cough.   SHe was instructed to rinse her mouth after her inhaler.  -She was also instructed to restart her Flonas

## 2020-03-12 NOTE — PATIENT INSTRUCTIONS
Keep well hydrated     Use the symbicort 2 puffs twice a day as needed for wheezing and cough. Rinse your mouth after each time. Restart your flonase    You can use robitussin DM or mucinex DM as directed on the bottle for cough and chest congestion. · Your appetite may be poor, so a light diet is fine. Stay well hydrated by drinking 6 to 8 glasses of fluids per day (water, soft drinks, juices, tea, or soup). Extra fluids will help loosen secretions in the nose and lungs.   · Over-the-counter cold medic

## 2020-03-17 ENCOUNTER — TELEPHONE (OUTPATIENT)
Dept: INTERNAL MEDICINE CLINIC | Facility: CLINIC | Age: 85
End: 2020-03-17

## 2020-03-17 RX ORDER — DOXYCYCLINE HYCLATE 100 MG
100 TABLET ORAL 2 TIMES DAILY
Qty: 14 TABLET | Refills: 0 | Status: SHIPPED | OUTPATIENT
Start: 2020-03-17 | End: 2020-07-16 | Stop reason: ALTCHOICE

## 2020-03-17 NOTE — TELEPHONE ENCOUNTER
Her cough is improved  She gets these worsening of her chronic coughs from time to time - a little more of the dry cough and coughing a little more often. No fever, chills, dyspnea, fatigue, .  She 'feels great\"  She has bronchiectasis and we will treat w/

## 2020-03-17 NOTE — TELEPHONE ENCOUNTER
Please reschedule appt from march 19 to 2 weeks later, thanks.  Will hopefully change to e-visits by then

## 2020-03-17 NOTE — TELEPHONE ENCOUNTER
Pt saw Anjel Meade last week for cough, states she still has the cough, both mucus and dry, no fever, no chills. Has appt with Dr Alec Echols 3-19 to review labs. Pt unsure if she needs to keep this appt or if she should reschedule, please advise.  Thank you

## 2020-05-29 ENCOUNTER — TELEPHONE (OUTPATIENT)
Dept: INTERNAL MEDICINE CLINIC | Facility: CLINIC | Age: 85
End: 2020-05-29

## 2020-05-29 NOTE — TELEPHONE ENCOUNTER
Virtual/Telephone Consent    Tariq Al verbally {consents to a Virtual/Telephone Check-In service on 6/16/2020. Patient understands and accepts financial responsibility for any deductible, co-insurance and/or co-pays associated with this service.

## 2020-06-15 ENCOUNTER — TELEPHONE (OUTPATIENT)
Dept: INTERNAL MEDICINE CLINIC | Facility: CLINIC | Age: 85
End: 2020-06-15

## 2020-06-15 DIAGNOSIS — R73.03 PREDIABETES: Primary | ICD-10-CM

## 2020-06-15 NOTE — TELEPHONE ENCOUNTER
Pt has telephone visit tomorrow  She needs to make this an office visit, need BP and lung check.  She needs to do a FBS and A1C first, prediabetes

## 2020-06-15 NOTE — TELEPHONE ENCOUNTER
Patient scheduled an OV for 7/16/20 for prediabetes lab results. Please place lab orders for [facility]. Patient will do labs prior to her visit.

## 2020-06-15 NOTE — TELEPHONE ENCOUNTER
PSR - See note below. Please call pt to change TV to OV. Please remind to do fasting labs prior to OV. Thanks!

## 2020-07-13 ENCOUNTER — APPOINTMENT (OUTPATIENT)
Dept: LAB | Age: 85
End: 2020-07-13
Attending: INTERNAL MEDICINE
Payer: MEDICARE

## 2020-07-13 DIAGNOSIS — R73.03 PREDIABETES: ICD-10-CM

## 2020-07-13 LAB
EST. AVERAGE GLUCOSE BLD GHB EST-MCNC: 128 MG/DL (ref 68–126)
GLUCOSE BLD-MCNC: 104 MG/DL (ref 70–99)
HBA1C MFR BLD HPLC: 6.1 % (ref ?–5.7)
PATIENT FASTING Y/N/NP: YES

## 2020-07-13 PROCEDURE — 36415 COLL VENOUS BLD VENIPUNCTURE: CPT

## 2020-07-13 PROCEDURE — 82947 ASSAY GLUCOSE BLOOD QUANT: CPT

## 2020-07-13 PROCEDURE — 83036 HEMOGLOBIN GLYCOSYLATED A1C: CPT

## 2020-07-16 ENCOUNTER — OFFICE VISIT (OUTPATIENT)
Dept: INTERNAL MEDICINE CLINIC | Facility: CLINIC | Age: 85
End: 2020-07-16
Payer: MEDICARE

## 2020-07-16 VITALS
WEIGHT: 134.88 LBS | DIASTOLIC BLOOD PRESSURE: 72 MMHG | RESPIRATION RATE: 14 BRPM | BODY MASS INDEX: 30.34 KG/M2 | HEIGHT: 56 IN | OXYGEN SATURATION: 98 % | HEART RATE: 68 BPM | SYSTOLIC BLOOD PRESSURE: 138 MMHG | TEMPERATURE: 98 F

## 2020-07-16 DIAGNOSIS — E78.2 MIXED HYPERLIPIDEMIA: ICD-10-CM

## 2020-07-16 DIAGNOSIS — R73.03 PRE-DIABETES: Primary | ICD-10-CM

## 2020-07-16 DIAGNOSIS — I10 ESSENTIAL HYPERTENSION: ICD-10-CM

## 2020-07-16 PROCEDURE — 99214 OFFICE O/P EST MOD 30 MIN: CPT | Performed by: INTERNAL MEDICINE

## 2020-07-16 NOTE — PROGRESS NOTES
Yadiel Mendoza is a 80year old female. To F/U from last visit regarding HTN, pre-diabetes and DL and medications  HPI:    her son checks her blood sugar on occasion, always <120  She feels fine  HTN:Denies CP, dyspnea, orthopnea, edema or palpitations.  D Problem List:     Cervical spondylosis     Paroxysmal atrial fibrillation (HCC)     Diastolic dysfunction     Mild aortic insufficiency     Hyperlipidemia     Bronchiectasis (HCC)     Essential hypertension     Long term (current) use of anticoagulants

## 2020-12-08 ENCOUNTER — TELEPHONE (OUTPATIENT)
Dept: INTERNAL MEDICINE CLINIC | Facility: CLINIC | Age: 85
End: 2020-12-08

## 2020-12-08 NOTE — TELEPHONE ENCOUNTER
Pt would like a call re lower back/hip pain. Pain comes and goes, \"living on tylenol right now\", pain is worse at night. Has had pain for 2 weeks. Pt has appt with Dr Rubén Alvarez, would like to know if she can get some pain meds prior to this appt.  Please advi

## 2020-12-09 NOTE — TELEPHONE ENCOUNTER
Spoke with pt. History of lower back and knee pain. Past 2 weeks pain has flared up, rt hip and sometimes thigh is now affected as well. Worse when lying down, pain can get as back as 8-9/10 then.  Otherwise can \"walk it off\", discomfort is \"always there

## 2020-12-10 ENCOUNTER — OFFICE VISIT (OUTPATIENT)
Dept: INTERNAL MEDICINE CLINIC | Facility: CLINIC | Age: 85
End: 2020-12-10
Payer: MEDICARE

## 2020-12-10 VITALS
BODY MASS INDEX: 30.41 KG/M2 | WEIGHT: 135.19 LBS | TEMPERATURE: 97 F | OXYGEN SATURATION: 98 % | HEIGHT: 56 IN | RESPIRATION RATE: 16 BRPM | SYSTOLIC BLOOD PRESSURE: 122 MMHG | HEART RATE: 57 BPM | DIASTOLIC BLOOD PRESSURE: 62 MMHG

## 2020-12-10 DIAGNOSIS — M16.11 OSTEOARTHRITIS OF RIGHT HIP, UNSPECIFIED OSTEOARTHRITIS TYPE: ICD-10-CM

## 2020-12-10 DIAGNOSIS — M54.41 ACUTE RIGHT-SIDED LOW BACK PAIN WITH RIGHT-SIDED SCIATICA: Primary | ICD-10-CM

## 2020-12-10 PROCEDURE — 99214 OFFICE O/P EST MOD 30 MIN: CPT | Performed by: INTERNAL MEDICINE

## 2020-12-10 RX ORDER — METHYLPREDNISOLONE 4 MG/1
TABLET ORAL
Qty: 1 KIT | Refills: 0 | Status: SHIPPED | OUTPATIENT
Start: 2020-12-10 | End: 2021-01-25 | Stop reason: ALTCHOICE

## 2020-12-10 RX ORDER — TRAMADOL HYDROCHLORIDE 50 MG/1
50 TABLET ORAL EVERY 6 HOURS PRN
Qty: 40 TABLET | Refills: 0 | Status: SHIPPED | OUTPATIENT
Start: 2020-12-10 | End: 2021-02-16

## 2020-12-10 NOTE — PROGRESS NOTES
Nickolas Soni is a 80year old female  Patient presents with:  Low Back Pain  Hip Pain: Right   Leg or Foot Injury: Right Thigh - Pain      HPI:   Right low back, hip, groin and thigh pain for 2 weeks, getting worse, intolerable at night, tolerable durin 50 strip 0      Patient Active Problem List:     Cervical spondylosis     Paroxysmal atrial fibrillation (HCC)     Diastolic dysfunction     Mild aortic insufficiency     Hyperlipidemia     Bronchiectasis (HCC)     Essential hypertension     Long term (cur and tylenol,at hs, pt on coumadin  PT      No orders of the defined types were placed in this encounter.       Meds & Refills for this Visit:  Requested Prescriptions      No prescriptions requested or ordered in this encounter       Imaging & Consults:  No

## 2020-12-12 ENCOUNTER — HOSPITAL ENCOUNTER (OUTPATIENT)
Dept: GENERAL RADIOLOGY | Age: 85
Discharge: HOME | End: 2020-12-12
Attending: INTERNAL MEDICINE
Payer: MEDICARE

## 2020-12-12 DIAGNOSIS — M54.41 ACUTE RIGHT-SIDED LOW BACK PAIN WITH RIGHT-SIDED SCIATICA: ICD-10-CM

## 2020-12-12 PROCEDURE — 73502 X-RAY EXAM HIP UNI 2-3 VIEWS: CPT | Performed by: INTERNAL MEDICINE

## 2020-12-12 PROCEDURE — 72110 X-RAY EXAM L-2 SPINE 4/>VWS: CPT | Performed by: INTERNAL MEDICINE

## 2020-12-30 ENCOUNTER — OFFICE VISIT (OUTPATIENT)
Dept: PAIN CLINIC | Facility: CLINIC | Age: 85
End: 2020-12-30
Payer: MEDICARE

## 2020-12-30 ENCOUNTER — TELEPHONE (OUTPATIENT)
Dept: PAIN CLINIC | Facility: CLINIC | Age: 85
End: 2020-12-30

## 2020-12-30 VITALS
HEIGHT: 56 IN | WEIGHT: 132 LBS | BODY MASS INDEX: 29.69 KG/M2 | HEART RATE: 77 BPM | RESPIRATION RATE: 16 BRPM | SYSTOLIC BLOOD PRESSURE: 122 MMHG | DIASTOLIC BLOOD PRESSURE: 74 MMHG | OXYGEN SATURATION: 96 %

## 2020-12-30 DIAGNOSIS — M25.551 HIP PAIN, CHRONIC, RIGHT: Primary | ICD-10-CM

## 2020-12-30 DIAGNOSIS — G89.29 HIP PAIN, CHRONIC, RIGHT: Primary | ICD-10-CM

## 2020-12-30 DIAGNOSIS — M16.11 PRIMARY OSTEOARTHRITIS OF RIGHT HIP: ICD-10-CM

## 2020-12-30 PROCEDURE — 99203 OFFICE O/P NEW LOW 30 MIN: CPT | Performed by: ANESTHESIOLOGY

## 2020-12-30 NOTE — H&P
Name: Uma Jean   : 1934   DOS: 2020     Chief complaint: Right hip pain    History of present illness:  Uma Jean is a 80year old female referred for evaluation of pain in the right hip.   The patient has been dealing with right Bromfenac Sodium 0.075 % Ophthalmic Solution 1 drop in surgery eye QD starting 3 days before surgery. Continue as directed. • besifloxacin HCl 0.6 % Ophthalmic Suspension 1 drop in surgery eye TID starting 3 days before surgery. Continue as directed. 2002/03    left      Family History   Problem Relation Age of Onset   • Other (bladder cancer) Father [de-identified]   • Other (colon cancer) Mother 80   • Breast Cancer Other         niece and herman   • Other (DM) Other    • Heart Disease Brother    • Other (tobacco hip    Assessment:  Hip pain, chronic, right  (primary encounter diagnosis)  Primary osteoarthritis of right hip. Plan:     The patient is a pleasant 51-year-old female with history of hip pain.   This is likely due to osteoarthritis in the hip joint s

## 2020-12-30 NOTE — PROGRESS NOTES
Location of Pain: lower back, right hip and both knees    Date Pain Began: 2-3 weeks          Work Related:   No        Receiving Work Comp/Disability:   No    Numeric Rating Scale:  Pain at Present:  3

## 2020-12-30 NOTE — TELEPHONE ENCOUNTER
Question Answer Comment   Anesthesia Type Local    Provider [place]    Location Lab    Procedure Other (please add comment) right hip   Implants No    Medical clearance requested (will send to Pain Navigator) No DOES NOT NEED TO HOLD WARFARIN   Patient has Me

## 2021-01-04 ENCOUNTER — TELEPHONE (OUTPATIENT)
Dept: PHYSICAL THERAPY | Facility: HOSPITAL | Age: 86
End: 2021-01-04

## 2021-01-04 ENCOUNTER — APPOINTMENT (OUTPATIENT)
Dept: PHYSICAL THERAPY | Facility: HOSPITAL | Age: 86
End: 2021-01-04
Attending: INTERNAL MEDICINE
Payer: MEDICARE

## 2021-01-06 ENCOUNTER — OFFICE VISIT (OUTPATIENT)
Dept: PHYSICAL THERAPY | Facility: HOSPITAL | Age: 86
End: 2021-01-06
Attending: INTERNAL MEDICINE
Payer: MEDICARE

## 2021-01-06 DIAGNOSIS — M16.11 OSTEOARTHRITIS OF RIGHT HIP, UNSPECIFIED OSTEOARTHRITIS TYPE: ICD-10-CM

## 2021-01-06 DIAGNOSIS — M54.41 ACUTE RIGHT-SIDED LOW BACK PAIN WITH RIGHT-SIDED SCIATICA: ICD-10-CM

## 2021-01-06 PROCEDURE — 97162 PT EVAL MOD COMPLEX 30 MIN: CPT

## 2021-01-06 PROCEDURE — 97110 THERAPEUTIC EXERCISES: CPT

## 2021-01-06 NOTE — TELEPHONE ENCOUNTER
Spoke with patient regarding scheduling hip inj. Patient stated she has been meaning to call but just has not gotten around to it yet.  Patient stated \"I am not having any pain at this time so I don't see the need to put any steroid in my hip or to go into

## 2021-01-11 ENCOUNTER — OFFICE VISIT (OUTPATIENT)
Dept: PHYSICAL THERAPY | Facility: HOSPITAL | Age: 86
End: 2021-01-11
Attending: INTERNAL MEDICINE
Payer: MEDICARE

## 2021-01-11 PROCEDURE — 97112 NEUROMUSCULAR REEDUCATION: CPT

## 2021-01-11 PROCEDURE — 97110 THERAPEUTIC EXERCISES: CPT

## 2021-01-13 ENCOUNTER — OFFICE VISIT (OUTPATIENT)
Dept: PHYSICAL THERAPY | Facility: HOSPITAL | Age: 86
End: 2021-01-13
Attending: INTERNAL MEDICINE
Payer: MEDICARE

## 2021-01-13 PROCEDURE — 97112 NEUROMUSCULAR REEDUCATION: CPT

## 2021-01-13 PROCEDURE — 97110 THERAPEUTIC EXERCISES: CPT

## 2021-01-20 ENCOUNTER — APPOINTMENT (OUTPATIENT)
Dept: PHYSICAL THERAPY | Facility: HOSPITAL | Age: 86
End: 2021-01-20
Attending: INTERNAL MEDICINE
Payer: MEDICARE

## 2021-01-21 NOTE — PROGRESS NOTES
Dx: R LBP c sciatica; R hip OA           Authorized # of Visits:  Medicare 10        Next MD visit: none scheduled  Fall Risk: high         Precautions: n/a             Subjective: Pt. States no significant pain today.  L knee still bothering her some from

## 2021-01-21 NOTE — PROGRESS NOTES
SPINE EVALUATION:   Referring Physician: Dr. Nazia Barksdale  Diagnosis: R LBP c sciatica; R hip OA     Date of Service: 1/06/2021     PATIENT SUMMARY   Nickolas Soni is a 80year old female who presents to therapy today with complaints of chronic LBP and impairment     GLASSES       Pt denies diplopia, dysarthria, dysphasia, dizziness, drop attacks, bowel/bladder changes, saddle anesthesia, and FIDE LE N/T.    ASSESSMENT  Roseline Ahumada presents to physical therapy evaluation with primary c/o LBP and R hip pain.  The major  Hamstrings: R -25; L -25  Piriformis: R major; L major  Quads: R mod; L mod  Gastroc-soleus: R mild; L mild     Special tests:   (+) R catherine, (+) R slump test; Repeated lumbar flexion = stretching, no radicular pain; Repeated lumbar extension = incr protection principles, improved posture and body mechanics. Frequency / Duration: Patient will be seen for 2 x/week or a total of 10 visits over a 90 day period.  Treatment will include: Gait training, Manual Therapy, Neuromuscular Re-education, Edgard Left

## 2021-01-25 ENCOUNTER — APPOINTMENT (OUTPATIENT)
Dept: PHYSICAL THERAPY | Facility: HOSPITAL | Age: 86
End: 2021-01-25
Attending: INTERNAL MEDICINE
Payer: MEDICARE

## 2021-01-25 ENCOUNTER — TELEPHONE (OUTPATIENT)
Dept: PHYSICAL THERAPY | Facility: HOSPITAL | Age: 86
End: 2021-01-25

## 2021-01-25 ENCOUNTER — OFFICE VISIT (OUTPATIENT)
Dept: INTERNAL MEDICINE CLINIC | Facility: CLINIC | Age: 86
End: 2021-01-25
Payer: MEDICARE

## 2021-01-25 VITALS
TEMPERATURE: 99 F | BODY MASS INDEX: 29.51 KG/M2 | DIASTOLIC BLOOD PRESSURE: 62 MMHG | RESPIRATION RATE: 14 BRPM | OXYGEN SATURATION: 98 % | WEIGHT: 131.19 LBS | HEART RATE: 76 BPM | HEIGHT: 56 IN | SYSTOLIC BLOOD PRESSURE: 108 MMHG

## 2021-01-25 DIAGNOSIS — B02.9 HERPES ZOSTER WITHOUT COMPLICATION: Primary | ICD-10-CM

## 2021-01-25 PROBLEM — H35.351 CYSTOID MACULAR EDEMA OF RIGHT EYE: Status: ACTIVE | Noted: 2017-03-23

## 2021-01-25 PROCEDURE — 99213 OFFICE O/P EST LOW 20 MIN: CPT | Performed by: INTERNAL MEDICINE

## 2021-01-25 RX ORDER — VALACYCLOVIR HYDROCHLORIDE 500 MG/1
500 TABLET, FILM COATED ORAL 3 TIMES DAILY
Qty: 30 TABLET | Refills: 0 | Status: SHIPPED | OUTPATIENT
Start: 2021-01-25 | End: 2021-03-05 | Stop reason: ALTCHOICE

## 2021-01-25 NOTE — PROGRESS NOTES
Barrie Curry is a 80year old female  Patient presents with:  Rash: Right Side Rash - noticed it last thursday - friday      HPI:   She has a rash coming on for the past 5 days, barely noticed at first, became a little tender until yesterday, no tendern • ketorolac tromethamine 0.5 % Ophthalmic Solution Place 1 drop into the right eye 2 (two) times daily. 6AM AND 6PM.     • Meclizine HCl 12.5 MG Oral Tab Take 12.5 mg by mouth 3 (three) times daily as needed.      • Dorzolamide HCl-Timolol Mal 22.3-6.8 MG/M /62 (BP Location: Left arm, Patient Position: Sitting, Cuff Size: adult)   Pulse 76   Temp 99 °F (37.2 °C) (Temporal)   Resp 14   Ht 4' 8\" (1.422 m)   Wt 131 lb 3.2 oz (59.5 kg)   SpO2 98%   BMI 29.41 kg/m²   GENERAL: well developed, well nourished,

## 2021-01-25 NOTE — PROGRESS NOTES
Dx: R LBP c sciatica; R hip OA           Authorized # of Visits:  Medicare 10        Next MD visit: none scheduled  Fall Risk: high         Precautions: n/a             Subjective: Pt. States no significant pain today in her hip or back. HEP going well.

## 2021-01-26 ENCOUNTER — TELEPHONE (OUTPATIENT)
Dept: PHYSICAL THERAPY | Facility: HOSPITAL | Age: 86
End: 2021-01-26

## 2021-01-26 NOTE — TELEPHONE ENCOUNTER
Pt. Called to cx all remaining visits. Pt. States she has a mild case of shingles and orders to stop PT at this time.

## 2021-01-27 ENCOUNTER — APPOINTMENT (OUTPATIENT)
Dept: PHYSICAL THERAPY | Facility: HOSPITAL | Age: 86
End: 2021-01-27
Attending: INTERNAL MEDICINE
Payer: MEDICARE

## 2021-02-01 ENCOUNTER — OFFICE VISIT (OUTPATIENT)
Dept: INTERNAL MEDICINE CLINIC | Facility: CLINIC | Age: 86
End: 2021-02-01
Payer: MEDICARE

## 2021-02-01 ENCOUNTER — APPOINTMENT (OUTPATIENT)
Dept: PHYSICAL THERAPY | Facility: HOSPITAL | Age: 86
End: 2021-02-01
Attending: INTERNAL MEDICINE
Payer: MEDICARE

## 2021-02-01 VITALS
BODY MASS INDEX: 29.54 KG/M2 | TEMPERATURE: 98 F | OXYGEN SATURATION: 98 % | SYSTOLIC BLOOD PRESSURE: 124 MMHG | DIASTOLIC BLOOD PRESSURE: 76 MMHG | RESPIRATION RATE: 14 BRPM | HEIGHT: 56 IN | HEART RATE: 73 BPM | WEIGHT: 131.31 LBS

## 2021-02-01 DIAGNOSIS — B02.9 HERPES ZOSTER WITHOUT COMPLICATION: Primary | ICD-10-CM

## 2021-02-01 PROCEDURE — 99213 OFFICE O/P EST LOW 20 MIN: CPT | Performed by: INTERNAL MEDICINE

## 2021-02-01 NOTE — PROGRESS NOTES
Omayra Escalona is a 80year old female. To F/U from last visit regarding zoster  HPI:    Interim history:  She is feeling fine, .  Has occ pruritus or burning pruritus, tolerable  Taking meds  Current Outpatient Medications   Medication Sig Dispense Refill History:  Social History    Tobacco Use      Smoking status: Never Smoker      Smokeless tobacco: Never Used    Alcohol use:  Yes      Alcohol/week: 0.0 - 0.8 standard drinks      Comment: socially    Drug use: No     Patient Active Problem List:     Cervic

## 2021-02-03 ENCOUNTER — APPOINTMENT (OUTPATIENT)
Dept: PHYSICAL THERAPY | Facility: HOSPITAL | Age: 86
End: 2021-02-03
Attending: INTERNAL MEDICINE
Payer: MEDICARE

## 2021-02-04 ENCOUNTER — TELEPHONE (OUTPATIENT)
Dept: INTERNAL MEDICINE CLINIC | Facility: CLINIC | Age: 86
End: 2021-02-04

## 2021-02-04 NOTE — TELEPHONE ENCOUNTER
Pt called and stated that their E-mail was not placed in system, Pt states that Dr. Tanesha Roberts said that the Covid vaccine signup would be sent by e-mail, amd was told by this around 2 weeks ago. PT believes this is the reason for the delay in receiving vaccine and would like a call back for further assistance with any information . Please advise, thank you.

## 2021-02-10 ENCOUNTER — APPOINTMENT (OUTPATIENT)
Dept: PHYSICAL THERAPY | Facility: HOSPITAL | Age: 86
End: 2021-02-10
Attending: INTERNAL MEDICINE
Payer: MEDICARE

## 2021-02-15 ENCOUNTER — TELEPHONE (OUTPATIENT)
Dept: INTERNAL MEDICINE CLINIC | Facility: CLINIC | Age: 86
End: 2021-02-15

## 2021-02-15 NOTE — TELEPHONE ENCOUNTER
Patient was diagnosed with shingles on February 1st.  She said visually, the rash is gone. She still has pain and some swelling. Please advise. Thank you!

## 2021-02-16 DIAGNOSIS — M54.41 ACUTE RIGHT-SIDED LOW BACK PAIN WITH RIGHT-SIDED SCIATICA: ICD-10-CM

## 2021-02-16 RX ORDER — TRAMADOL HYDROCHLORIDE 50 MG/1
50 TABLET ORAL EVERY 6 HOURS PRN
Qty: 40 TABLET | Refills: 0 | Status: SHIPPED | OUTPATIENT
Start: 2021-02-16 | End: 2021-04-01

## 2021-02-16 NOTE — TELEPHONE ENCOUNTER
Spoke with pt. Was evaluated for shingles 1/25/21 and 2/1/21. Finished Valtrex a few days ago. States is with constant pain, pain level varies, sometimes is \"tremendous\", ache, sharp. Clothing against skin is very painful.   Lesions are \"scabby\", sti

## 2021-02-16 NOTE — TELEPHONE ENCOUNTER
I refilled it  Please have her f/u with me if pain not controlled adequately (<7/10) or if develops any signs of infection :fever, pus drainage, increased redness, increased swelling, chills)

## 2021-02-16 NOTE — TELEPHONE ENCOUNTER
Spoke to pt. Made aware of refill for tramadol sent. Advised to f/u with Dr. Shana Nixon as noted below. Pt voiced understanding.

## 2021-02-19 ENCOUNTER — TELEPHONE (OUTPATIENT)
Dept: PHYSICAL THERAPY | Age: 86
End: 2021-02-19

## 2021-02-24 ENCOUNTER — PATIENT MESSAGE (OUTPATIENT)
Dept: INTERNAL MEDICINE CLINIC | Facility: CLINIC | Age: 86
End: 2021-02-24

## 2021-02-25 ENCOUNTER — TELEPHONE (OUTPATIENT)
Dept: INTERNAL MEDICINE CLINIC | Facility: CLINIC | Age: 86
End: 2021-02-25

## 2021-02-25 NOTE — TELEPHONE ENCOUNTER
Patient would like to talk to a nurse regarding remaining shingles as well as a cyst that might be infected. Please advise. Thank you!

## 2021-02-25 NOTE — TELEPHONE ENCOUNTER
From: Micah Leos  To: Grace Hoyos MD  Sent: 2/24/2021 8:05 PM CST  Subject: Other    Dear Doctor Cynthia Green:    As I am over 80years old with a pre-existing condition; I would be classified as a Phase 1A Covid-19 vaccine candidate.  Give

## 2021-02-25 NOTE — TELEPHONE ENCOUNTER
Spoke to pt. Pt states thinks cyst in left breast is infected. States it broke & after shower there was big hole. States now it is \"a little red with yellow spot\"  Located left side of left breast, kind of under arm. About size of nickel.   Denies any

## 2021-02-26 ENCOUNTER — OFFICE VISIT (OUTPATIENT)
Dept: INTERNAL MEDICINE CLINIC | Facility: CLINIC | Age: 86
End: 2021-02-26
Payer: MEDICARE

## 2021-02-26 VITALS
TEMPERATURE: 98 F | RESPIRATION RATE: 14 BRPM | WEIGHT: 129.31 LBS | SYSTOLIC BLOOD PRESSURE: 104 MMHG | OXYGEN SATURATION: 97 % | DIASTOLIC BLOOD PRESSURE: 62 MMHG | HEART RATE: 72 BPM | HEIGHT: 56 IN | BODY MASS INDEX: 29.09 KG/M2

## 2021-02-26 DIAGNOSIS — L08.9 INFECTED SEBACEOUS CYST: ICD-10-CM

## 2021-02-26 DIAGNOSIS — L72.3 INFECTED SEBACEOUS CYST: ICD-10-CM

## 2021-02-26 DIAGNOSIS — R19.00 RIGHT FLANK MASS: Primary | ICD-10-CM

## 2021-02-26 PROCEDURE — 99214 OFFICE O/P EST MOD 30 MIN: CPT | Performed by: INTERNAL MEDICINE

## 2021-02-26 RX ORDER — CEPHALEXIN 250 MG/1
250 CAPSULE ORAL 3 TIMES DAILY
Qty: 21 CAPSULE | Refills: 0 | Status: SHIPPED | OUTPATIENT
Start: 2021-02-26 | End: 2021-03-19 | Stop reason: ALTCHOICE

## 2021-02-26 NOTE — PATIENT INSTRUCTIONS
Cleanse with hydrogen peroxide twice daily and apply bacitracin twice daily and cover loosely with bandage

## 2021-02-26 NOTE — PROGRESS NOTES
Bonny Marcelino is a 80year old female  Patient presents with:  Cyst: cyst on the left side by breast for 1 week      HPI:   She developed a cyst on her left side which  Burst. She had this once before and surgery was recommended but never happened again. Ophthalmic Solution Place 1 drop into the right eye 2 (two) times daily. 6AM AND 6PM.     • Meclizine HCl 12.5 MG Oral Tab Take 12.5 mg by mouth 3 (three) times daily as needed.      • Dorzolamide HCl-Timolol Mal 22.3-6.8 MG/ML Ophthalmic Solution Place 1 d Size: adult)   Pulse 72   Temp 98.2 °F (36.8 °C) (Temporal)   Resp 14   Ht 4' 8\" (1.422 m)   Wt 129 lb 4.8 oz (58.7 kg)   SpO2 97%   BMI 28.99 kg/m²   GENERAL: well developed, well nourished,in no apparent distress  SKIN: left flank w/a draining inflammed

## 2021-03-05 ENCOUNTER — HOSPITAL ENCOUNTER (OUTPATIENT)
Dept: ULTRASOUND IMAGING | Age: 86
Discharge: HOME | End: 2021-03-05
Attending: INTERNAL MEDICINE
Payer: MEDICARE

## 2021-03-05 ENCOUNTER — OFFICE VISIT (OUTPATIENT)
Dept: INTERNAL MEDICINE CLINIC | Facility: CLINIC | Age: 86
End: 2021-03-05
Payer: MEDICARE

## 2021-03-05 VITALS
HEART RATE: 72 BPM | BODY MASS INDEX: 28.57 KG/M2 | HEIGHT: 56 IN | SYSTOLIC BLOOD PRESSURE: 108 MMHG | RESPIRATION RATE: 14 BRPM | OXYGEN SATURATION: 99 % | TEMPERATURE: 99 F | WEIGHT: 127 LBS | DIASTOLIC BLOOD PRESSURE: 64 MMHG

## 2021-03-05 DIAGNOSIS — L72.3 SEBACEOUS CYST: ICD-10-CM

## 2021-03-05 DIAGNOSIS — R19.00 RIGHT FLANK MASS: ICD-10-CM

## 2021-03-05 DIAGNOSIS — K76.9 LIVER LESION: Primary | ICD-10-CM

## 2021-03-05 PROCEDURE — 99213 OFFICE O/P EST LOW 20 MIN: CPT | Performed by: INTERNAL MEDICINE

## 2021-03-05 PROCEDURE — 76700 US EXAM ABDOM COMPLETE: CPT | Performed by: INTERNAL MEDICINE

## 2021-03-08 ENCOUNTER — TELEPHONE (OUTPATIENT)
Dept: INTERNAL MEDICINE CLINIC | Facility: CLINIC | Age: 86
End: 2021-03-08

## 2021-03-08 DIAGNOSIS — Z23 NEED FOR VACCINATION: ICD-10-CM

## 2021-03-08 NOTE — TELEPHONE ENCOUNTER
Patient said she had discussed the results with Dr Nichelle Emery. PSR confirmed this with triage. No appointment needed.

## 2021-03-08 NOTE — TELEPHONE ENCOUNTER
PSR - per Dr Demi Ordoñez, appt needed to discuss ultrasound result. Pt reviewed request via [employer] already. Thanks!

## 2021-03-08 NOTE — PROGRESS NOTES
Omayra Escalona is a 80year old female. To F/U from last visit regarding sebaceous cyst, shingles and abd bulge  HPI:    Interim history:US negative for bulge, only probably fatty liver lesion. Sebaceous cyst feels much better, finished w/abx.  Shingles co ACCU-CHEK GREGORY PLUS In Vitro Strip TEST BLOOD DAILY 50 strip 0         Social History:  Social History    Tobacco Use      Smoking status: Never Smoker      Smokeless tobacco: Never Used    Vaping Use      Vaping Use: Never used    Alcohol use:  Yes      A right hepatic lobe. There is a focal region of echogenicity within the liver near the gallbladder fossa measuring 2 x 1.5 x 1.9 cm. BILIARY:  There is a 4 mm nonmobile echogenic focus within the gallbladder. Common bile duct diameter is 5 mm.  PANCREAS:

## 2021-03-10 ENCOUNTER — IMMUNIZATION (OUTPATIENT)
Dept: LAB | Age: 86
End: 2021-03-10
Attending: HOSPITALIST
Payer: MEDICARE

## 2021-03-10 DIAGNOSIS — Z23 NEED FOR VACCINATION: Primary | ICD-10-CM

## 2021-03-10 PROCEDURE — 0001A SARSCOV2 VAC 30MCG/0.3ML IM: CPT

## 2021-03-19 ENCOUNTER — OFFICE VISIT (OUTPATIENT)
Dept: INTERNAL MEDICINE CLINIC | Facility: CLINIC | Age: 86
End: 2021-03-19
Payer: MEDICARE

## 2021-03-19 VITALS
OXYGEN SATURATION: 100 % | SYSTOLIC BLOOD PRESSURE: 122 MMHG | TEMPERATURE: 97 F | DIASTOLIC BLOOD PRESSURE: 82 MMHG | HEART RATE: 74 BPM | RESPIRATION RATE: 14 BRPM | HEIGHT: 56 IN | BODY MASS INDEX: 29.05 KG/M2 | WEIGHT: 129.13 LBS

## 2021-03-19 DIAGNOSIS — B02.29 POST HERPETIC NEURALGIA: ICD-10-CM

## 2021-03-19 DIAGNOSIS — R39.89 ABNORMAL URINE COLOR: Primary | ICD-10-CM

## 2021-03-19 DIAGNOSIS — R19.01 RIGHT UPPER QUADRANT ABDOMINAL MASS: ICD-10-CM

## 2021-03-19 DIAGNOSIS — F41.8 ANXIETY ABOUT HEALTH: ICD-10-CM

## 2021-03-19 LAB
APPEARANCE: CLEAR
BILIRUB UR QL STRIP.AUTO: NEGATIVE
BILIRUBIN: NEGATIVE
COLOR UR AUTO: YELLOW
GLUCOSE (URINE DIPSTICK): NEGATIVE MG/DL
GLUCOSE UR STRIP.AUTO-MCNC: NEGATIVE MG/DL
KETONES (URINE DIPSTICK): NEGATIVE MG/DL
KETONES UR STRIP.AUTO-MCNC: NEGATIVE MG/DL
MULTISTIX LOT#: 5077 NUMERIC
NITRITE UR QL STRIP.AUTO: NEGATIVE
NITRITE, URINE: NEGATIVE
PH UR STRIP.AUTO: 6 [PH] (ref 5–8)
PH, URINE: 6 (ref 4.5–8)
PROT UR STRIP.AUTO-MCNC: NEGATIVE MG/DL
PROTEIN (URINE DIPSTICK): NEGATIVE MG/DL
SP GR UR STRIP.AUTO: 1.01 (ref 1–1.03)
SPECIFIC GRAVITY: 1.01 (ref 1–1.03)
URINE-COLOR: YELLOW
UROBILINOGEN UR STRIP.AUTO-MCNC: <2 MG/DL
UROBILINOGEN,SEMI-QN: 0.2 MG/DL (ref 0–1.9)

## 2021-03-19 PROCEDURE — 99214 OFFICE O/P EST MOD 30 MIN: CPT | Performed by: INTERNAL MEDICINE

## 2021-03-19 PROCEDURE — 87086 URINE CULTURE/COLONY COUNT: CPT | Performed by: INTERNAL MEDICINE

## 2021-03-19 PROCEDURE — 81003 URINALYSIS AUTO W/O SCOPE: CPT | Performed by: INTERNAL MEDICINE

## 2021-03-19 PROCEDURE — 81001 URINALYSIS AUTO W/SCOPE: CPT | Performed by: INTERNAL MEDICINE

## 2021-03-19 NOTE — PROGRESS NOTES
Edmund Nguyễn is a 80year old female  Patient presents with:  Urinary: Noticing differences in urine - looking like her urine is a Water - Oil combination      HPI:   Urine has a clear spot floating daily for about a week  Had some aime irritation from tablet 3   • Metoprolol Tartrate 50 MG Oral Tab Take 1 tablet (50 mg total) by mouth 2 (two) times daily. 180 tablet 3   • atorvastatin 10 MG Oral Tab Take 1 tablet (10 mg total) by mouth nightly.  90 tablet 3   • digoxin (DIGOX) 0.125 MG Oral Tab TAKE 1 TA Essential hypertension     Long term (current) use of anticoagulants     Nonspecific abnormal electrocardiogram (ECG) (EKG)     Mixed hyperlipidemia     Chronic cough     Primary osteoarthritis of right knee     Status post total right knee replacement This Encounter      Urinalysis, Routine [E]      Urine Dip, auto without Micro      *Specimen Handling      Urine Culture, Routine [E]      Meds & Refills for this Visit:  Requested Prescriptions      No prescriptions requested or ordered in this encounter

## 2021-03-31 ENCOUNTER — APPOINTMENT (OUTPATIENT)
Dept: GENERAL RADIOLOGY | Facility: HOSPITAL | Age: 86
End: 2021-03-31
Attending: EMERGENCY MEDICINE
Payer: MEDICARE

## 2021-03-31 ENCOUNTER — APPOINTMENT (OUTPATIENT)
Dept: LAB | Age: 86
End: 2021-03-31
Attending: HOSPITALIST
Payer: MEDICARE

## 2021-03-31 ENCOUNTER — APPOINTMENT (OUTPATIENT)
Dept: CT IMAGING | Facility: HOSPITAL | Age: 86
End: 2021-03-31
Attending: EMERGENCY MEDICINE
Payer: MEDICARE

## 2021-03-31 ENCOUNTER — HOSPITAL ENCOUNTER (EMERGENCY)
Facility: HOSPITAL | Age: 86
Discharge: HOME | End: 2021-03-31
Attending: EMERGENCY MEDICINE
Payer: MEDICARE

## 2021-03-31 ENCOUNTER — APPOINTMENT (OUTPATIENT)
Dept: GENERAL RADIOLOGY | Facility: HOSPITAL | Age: 86
End: 2021-03-31
Payer: MEDICARE

## 2021-03-31 ENCOUNTER — IMMUNIZATION (OUTPATIENT)
Dept: LAB | Age: 86
End: 2021-03-31
Attending: HOSPITALIST
Payer: MEDICARE

## 2021-03-31 VITALS
WEIGHT: 126 LBS | HEIGHT: 56 IN | OXYGEN SATURATION: 99 % | RESPIRATION RATE: 18 BRPM | TEMPERATURE: 98 F | HEART RATE: 61 BPM | SYSTOLIC BLOOD PRESSURE: 186 MMHG | BODY MASS INDEX: 28.34 KG/M2 | DIASTOLIC BLOOD PRESSURE: 74 MMHG

## 2021-03-31 DIAGNOSIS — S80.211A KNEE ABRASION, RIGHT, INITIAL ENCOUNTER: ICD-10-CM

## 2021-03-31 DIAGNOSIS — S40.012A CONTUSION OF LEFT SHOULDER, INITIAL ENCOUNTER: ICD-10-CM

## 2021-03-31 DIAGNOSIS — S01.81XA FACIAL LACERATION, INITIAL ENCOUNTER: Primary | ICD-10-CM

## 2021-03-31 DIAGNOSIS — Z23 NEED FOR VACCINATION: Primary | ICD-10-CM

## 2021-03-31 DIAGNOSIS — S02.40CA CLOSED FRACTURE OF RIGHT SIDE OF MAXILLA, INITIAL ENCOUNTER (HCC): ICD-10-CM

## 2021-03-31 DIAGNOSIS — S02.2XXB OPEN FRACTURE OF NASAL BONE, INITIAL ENCOUNTER: ICD-10-CM

## 2021-03-31 DIAGNOSIS — Z79.01 ANTICOAGULATED: ICD-10-CM

## 2021-03-31 DIAGNOSIS — S61.411A LACERATION OF RIGHT HAND WITHOUT FOREIGN BODY, INITIAL ENCOUNTER: ICD-10-CM

## 2021-03-31 DIAGNOSIS — S80.212A KNEE ABRASION, LEFT, INITIAL ENCOUNTER: ICD-10-CM

## 2021-03-31 DIAGNOSIS — S09.90XA INJURY OF HEAD, INITIAL ENCOUNTER: ICD-10-CM

## 2021-03-31 PROCEDURE — 80053 COMPREHEN METABOLIC PANEL: CPT | Performed by: EMERGENCY MEDICINE

## 2021-03-31 PROCEDURE — 72040 X-RAY EXAM NECK SPINE 2-3 VW: CPT | Performed by: EMERGENCY MEDICINE

## 2021-03-31 PROCEDURE — 85730 THROMBOPLASTIN TIME PARTIAL: CPT | Performed by: EMERGENCY MEDICINE

## 2021-03-31 PROCEDURE — 99285 EMERGENCY DEPT VISIT HI MDM: CPT

## 2021-03-31 PROCEDURE — 12013 RPR F/E/E/N/L/M 2.6-5.0 CM: CPT

## 2021-03-31 PROCEDURE — 90471 IMMUNIZATION ADMIN: CPT

## 2021-03-31 PROCEDURE — 0002A SARSCOV2 VAC 30MCG/0.3ML IM: CPT

## 2021-03-31 PROCEDURE — 85025 COMPLETE CBC W/AUTO DIFF WBC: CPT | Performed by: EMERGENCY MEDICINE

## 2021-03-31 PROCEDURE — 93010 ELECTROCARDIOGRAM REPORT: CPT

## 2021-03-31 PROCEDURE — 85610 PROTHROMBIN TIME: CPT | Performed by: EMERGENCY MEDICINE

## 2021-03-31 PROCEDURE — 73030 X-RAY EXAM OF SHOULDER: CPT | Performed by: EMERGENCY MEDICINE

## 2021-03-31 PROCEDURE — 70450 CT HEAD/BRAIN W/O DYE: CPT | Performed by: EMERGENCY MEDICINE

## 2021-03-31 PROCEDURE — 93005 ELECTROCARDIOGRAM TRACING: CPT

## 2021-03-31 RX ORDER — HYDROCODONE BITARTRATE AND ACETAMINOPHEN 5; 325 MG/1; MG/1
1-2 TABLET ORAL EVERY 6 HOURS PRN
Qty: 10 TABLET | Refills: 0 | Status: SHIPPED | OUTPATIENT
Start: 2021-03-31 | End: 2021-04-07

## 2021-03-31 RX ORDER — ONDANSETRON 4 MG/1
4 TABLET, ORALLY DISINTEGRATING ORAL ONCE
Status: COMPLETED | OUTPATIENT
Start: 2021-03-31 | End: 2021-03-31

## 2021-03-31 RX ORDER — METOCLOPRAMIDE 10 MG/1
10 TABLET ORAL 3 TIMES DAILY PRN
Qty: 20 TABLET | Refills: 0 | Status: SHIPPED | OUTPATIENT
Start: 2021-03-31 | End: 2021-04-30

## 2021-03-31 RX ORDER — DOXYCYCLINE HYCLATE 100 MG/1
100 CAPSULE ORAL 2 TIMES DAILY
Qty: 10 CAPSULE | Refills: 0 | Status: SHIPPED | OUTPATIENT
Start: 2021-03-31 | End: 2021-04-05

## 2021-03-31 RX ORDER — DOXYCYCLINE HYCLATE 100 MG/1
100 CAPSULE ORAL ONCE
Status: COMPLETED | OUTPATIENT
Start: 2021-03-31 | End: 2021-03-31

## 2021-03-31 NOTE — ED PROVIDER NOTES
Patient Seen in: [facility] Emergency Department      History   Patient presents with:  Fall    Stated Complaint: Fall    HPI/Subjective:   HPI    71-year-old female on warfarin stated that she tripped and fell in a parking lot hitting her head, her Used    Vaping Use      Vaping Use: Never used    Alcohol use: Yes      Alcohol/week: 0.0 - 0.8 standard drinks      Comment: socially    Drug use: No             Review of Systems    Positive for stated complaint: Fall  Other systems are as noted in HPI. apparent foreign body, neurovascular or tendon involvement. Skin: No other lesions were noted. Neuro exam: Awake, conversive and moving all 4 extremities well.     ED Course     Labs Reviewed   COMP METABOLIC PANEL (14) - Abnormal; Notable for the followi within normal limits. The visualized portion of the lungs are clear. Osteoarthritic changes. Minimal left basilar opacity. Slight irregularity of the superolateral aspect of the humeral head likely from rotator cuff tendinopathy.              CONCLUSION Mary Dey MD on 3/31/2021 at 1:33 PM     Finalized by (CST): Kathrin Hamilton MD on 3/31/2021 at 1:39 PM       58 Bonnie Ventura (ZVM=[id number])    Result Date: 3/5/2021  PROCEDURE:  US ABDOMEN COMPLETE (CPT=76700)  COMPARISON:  [facility] , CT, CT ABDOMEN+PELVIS(ALL W at 2:01 PM     Finalized by (CST): Junior Pace MD on 3/31/2021 at 2:02 PM             Result Date: 3/31/2021  PROCEDURE:  XR CERVICAL SPINE (TRAUMA) PORTABLE  (CPT=72040)  TECHNIQUE:  AP, lateral, and oblique, and coned down C1-2 views were obtained. contusion  Right hand laceration  Bilateral knee abrasions  Anticoagulated                         Disposition and Plan     Clinical Impression:  Facial laceration, initial encounter  (primary encounter diagnosis)  Open fracture of nasal bone, initial enco

## 2021-03-31 NOTE — ED INITIAL ASSESSMENT (HPI)
Pt here via ambulance. Pt was walking to her car and tripped and has lacerations to the bridge of the nose. Pt is complaining of pain to her left shoulder. Pt also has a lac to her right hand.

## 2021-04-01 ENCOUNTER — TELEPHONE (OUTPATIENT)
Dept: INTERNAL MEDICINE CLINIC | Facility: CLINIC | Age: 86
End: 2021-04-01

## 2021-04-01 DIAGNOSIS — S02.2XXB OPEN FRACTURE OF NASAL BONE, INITIAL ENCOUNTER: ICD-10-CM

## 2021-04-01 DIAGNOSIS — S02.2XXD OPEN FRACTURE OF NASAL BONE WITH ROUTINE HEALING, SUBSEQUENT ENCOUNTER: Primary | ICD-10-CM

## 2021-04-01 DIAGNOSIS — S02.401D: ICD-10-CM

## 2021-04-01 DIAGNOSIS — M54.41 ACUTE RIGHT-SIDED LOW BACK PAIN WITH RIGHT-SIDED SCIATICA: ICD-10-CM

## 2021-04-01 DIAGNOSIS — S02.42XA CLOSED FRACTURE OF ALVEOLAR PROCESS OF MAXILLA, INITIAL ENCOUNTER (HCC): ICD-10-CM

## 2021-04-01 DIAGNOSIS — J34.2 NOSE SEPTUM DEVIATION: ICD-10-CM

## 2021-04-01 DIAGNOSIS — S02.40CA CLOSED FRACTURE OF RIGHT SIDE OF MAXILLA, INITIAL ENCOUNTER (HCC): ICD-10-CM

## 2021-04-01 RX ORDER — TRAMADOL HYDROCHLORIDE 50 MG/1
50 TABLET ORAL EVERY 6 HOURS PRN
Qty: 40 TABLET | Refills: 0 | Status: SHIPPED | OUTPATIENT
Start: 2021-04-01 | End: 2021-12-16

## 2021-04-01 NOTE — TELEPHONE ENCOUNTER
Spoke with pt. Pt states is doing okay, has 2 sons that are helping her out. Pt declined home health. Took Tramadol last night since she was more familiar with that medication. Now out of her Tramadol. Will be taking Norco today. Advised of fall risk.  Osvaldo

## 2021-04-01 NOTE — TELEPHONE ENCOUNTER
See ER note  Extensive trauma, facial and nasal fractures, laceration, w/sutures  No documentation of size of laceration or # sutures  Pt is quite elderly and lives alone- how is she doing? Does she have help and support?  Tolerating norco? Is a Fall risk f

## 2021-04-01 NOTE — TELEPHONE ENCOUNTER
Spoke to pt. Advised of Dr. Brown Darwin recommendation to only take the rx from eye doctor. Pt voiced understanding.

## 2021-04-01 NOTE — TELEPHONE ENCOUNTER
Pt called and stated that they recently were at the ER on 03/31/2021. During their visit they talked about having a infection on their stiches to which Dr. Nano Zuniga Doctor) prescribed Doxycycline Hyclate 100 MG Oral Cap to help with the infection.  Marc

## 2021-04-01 NOTE — TELEPHONE ENCOUNTER
See note below. On 3/24/21, pt was given Doxycycline 100mg by Dr. Emeterio Mayers (ophthalomology) for eye infection for 10 days. Pt was also given Doxycycline 100mg by Dr. Stacey Urena ( MD) to minimize infection from open nasal fx for 5 days.     Please advise

## 2021-04-01 NOTE — TELEPHONE ENCOUNTER
Spoke with pt, she would prefer the tramadol. Pended. Reminded pt to not take norco at same time as tramadol, pt stated she was aware she cannot do both.

## 2021-04-12 ENCOUNTER — OFFICE VISIT (OUTPATIENT)
Dept: INTERNAL MEDICINE CLINIC | Facility: CLINIC | Age: 86
End: 2021-04-12
Payer: MEDICARE

## 2021-04-12 VITALS
HEART RATE: 66 BPM | HEIGHT: 56 IN | SYSTOLIC BLOOD PRESSURE: 112 MMHG | BODY MASS INDEX: 28.94 KG/M2 | RESPIRATION RATE: 14 BRPM | WEIGHT: 128.63 LBS | OXYGEN SATURATION: 98 % | DIASTOLIC BLOOD PRESSURE: 58 MMHG | TEMPERATURE: 96 F

## 2021-04-12 DIAGNOSIS — Z48.02 VISIT FOR SUTURE REMOVAL: Primary | ICD-10-CM
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
## 2021-04-12 DIAGNOSIS — S01.21XD: ICD-10-CM

## 2021-04-12 DIAGNOSIS — S61.411D LACERATION OF RIGHT HAND WITHOUT FOREIGN BODY, SUBSEQUENT ENCOUNTER: ICD-10-CM

## 2021-04-12 DIAGNOSIS — S02.2XXD CLOSED FRACTURE OF NASAL BONE WITH ROUTINE HEALING, SUBSEQUENT ENCOUNTER: ICD-10-CM

## 2021-04-12 PROCEDURE — 99214 OFFICE O/P EST MOD 30 MIN: CPT | Performed by: NURSE PRACTITIONER

## 2021-04-12 RX ORDER — ERYTHROMYCIN 5 MG/G
1 OINTMENT OPHTHALMIC 2 TIMES DAILY
COMMUNITY
Start: 2021-04-08 | End: 2021-06-15 | Stop reason: ALTCHOICE

## 2021-04-12 NOTE — PROGRESS NOTES
Ha Kelly is a 80year old female. CHIEF COMPLAINT   ER follow up, fall, nasal fracture, Lacerations to head, right hand    HPI:   The patient has 5 sutures to the head and 3 to the right hand that need to be removed.  She sustained the injuries afte Ophthalmic Solution Place 1 drop into the right eye 2 (two) times daily. 6AM AND 6PM.     • Meclizine HCl 12.5 MG Oral Tab Take 12.5 mg by mouth 3 (three) times daily as needed.      • Dorzolamide HCl-Timolol Mal 22.3-6.8 MG/ML Ophthalmic Solution Place 1 d (35.8 °C) (Temporal)   Resp 14   Ht 4' 8\" (1.422 m)   Wt 128 lb 9.6 oz (58.3 kg)   SpO2 98%   BMI 28.83 kg/m²   Body mass index is 28.83 kg/m².    GENERAL: well developed, well nourished,in no apparent distress  SKIN: has general ecchymosis   HEENT:  Normo 07/13/2020    A1C 6.1 (H) 07/13/2020        IMAGING:     XR KNEE ROUTINE (3 VIEWS), LEFT (EWK=46647)    Result Date: 3/31/2021    X-ray both knees three views each: Total knee implants are well aligned. No signs of loosening. No fractures.     XR KNEE ROU FINDINGS:  Ventricles and sulci are diffusely prominent in caliber, which may be upper normal allowing for age. No mass effect or midline shift. No findings of territorial infarction or acute intracranial hemorrhage.   There may be mild chronic small vess views of the cervical spine was provided for interpretation. No acute fractures. Multilevel degenerative disc disease.    Dictated by (CST): Rochelle Lopez MD on 3/31/2021 at 1:06 PM     Finalized by (CST): Rochelle Lopez MD on 3/31/2021 at 1:07 PM

## 2021-04-12 NOTE — PATIENT INSTRUCTIONS
Continue to monitor the hand and forehead lacerations for healing and signs of infection (redness, warmth, drainage, pain, swelling)    Use neosporin to the hand and apply a bandage until the skin is closed    Apply neosporin to the forehead and leave open

## 2021-05-04 ENCOUNTER — OFFICE VISIT (OUTPATIENT)
Dept: INTERNAL MEDICINE CLINIC | Facility: CLINIC | Age: 86
End: 2021-05-04
Payer: MEDICARE

## 2021-05-04 VITALS
HEIGHT: 56 IN | WEIGHT: 125.31 LBS | HEART RATE: 76 BPM | OXYGEN SATURATION: 100 % | DIASTOLIC BLOOD PRESSURE: 54 MMHG | RESPIRATION RATE: 14 BRPM | BODY MASS INDEX: 28.19 KG/M2 | SYSTOLIC BLOOD PRESSURE: 116 MMHG | TEMPERATURE: 98 F

## 2021-05-04 DIAGNOSIS — Z91.81 AT HIGH RISK FOR FALLS: ICD-10-CM

## 2021-05-04 DIAGNOSIS — E78.2 MIXED HYPERLIPIDEMIA: ICD-10-CM

## 2021-05-04 DIAGNOSIS — Z00.00 ENCOUNTER FOR ANNUAL HEALTH EXAMINATION: Primary | ICD-10-CM

## 2021-05-04 DIAGNOSIS — R31.29 MICROHEMATURIA: ICD-10-CM

## 2021-05-04 DIAGNOSIS — I48.0 PAROXYSMAL ATRIAL FIBRILLATION (HCC): ICD-10-CM

## 2021-05-04 DIAGNOSIS — K76.9 LIVER LESION: ICD-10-CM

## 2021-05-04 DIAGNOSIS — I10 ESSENTIAL HYPERTENSION: ICD-10-CM

## 2021-05-04 DIAGNOSIS — R73.03 PRE-DIABETES: ICD-10-CM

## 2021-05-04 DIAGNOSIS — Z79.01 LONG TERM (CURRENT) USE OF ANTICOAGULANTS: ICD-10-CM

## 2021-05-04 DIAGNOSIS — J47.9 BRONCHIECTASIS WITHOUT COMPLICATION (HCC): ICD-10-CM

## 2021-05-04 DIAGNOSIS — B02.29 POSTHERPETIC NEURALGIA: ICD-10-CM

## 2021-05-04 DIAGNOSIS — K82.4 GALLBLADDER POLYP: ICD-10-CM

## 2021-05-04 PROBLEM — R05.3 CHRONIC COUGH: Status: RESOLVED | Noted: 2017-08-10 | Resolved: 2021-05-04

## 2021-05-04 PROBLEM — R94.31 NONSPECIFIC ABNORMAL ELECTROCARDIOGRAM (ECG) (EKG): Status: RESOLVED | Noted: 2017-04-24 | Resolved: 2021-05-04

## 2021-05-04 PROBLEM — M19.072 OSTEOARTHRITIS OF MIDTARSAL JOINT OF LEFT FOOT: Status: RESOLVED | Noted: 2019-08-01 | Resolved: 2021-05-04

## 2021-05-04 PROBLEM — M17.11 PRIMARY OSTEOARTHRITIS OF RIGHT KNEE: Status: RESOLVED | Noted: 2018-01-17 | Resolved: 2021-05-04

## 2021-05-04 PROCEDURE — G0439 PPPS, SUBSEQ VISIT: HCPCS | Performed by: INTERNAL MEDICINE

## 2021-05-04 RX ORDER — LIDOCAINE 4 G/G
1 PATCH TOPICAL EVERY 24 HOURS
Qty: 30 PATCH | Refills: 2 | Status: SHIPPED | OUTPATIENT
Start: 2021-05-04

## 2021-05-04 NOTE — PATIENT INSTRUCTIONS
Catina Amin's SCREENING SCHEDULE   Tests on this list are recommended by your physician but may not be covered, or covered at this frequency, by your insurer. Please check with your insurance carrier before scheduling to verify coverage.    PREVENTATI is not a screening covered service except at the Welcome to Medicare Visit    Abdominal aortic aneurysm screening (once between ages 73-68) IPPE only No results found for this or any previous visit.  Limited to patients who meet one of the following criteri Screening Mammogram      Mammogram    Recommend Annually to at least age 76, and as needed after 76 There are no preventive care reminders to display for this patient.  Please get this Mammogram regularly   Immunizations      Influenza  Covered Annually O and print using their home computer and printer. (the forms are also available in [address])  [email or web address]  This link also has information from the [facility] regarding Advance Directives.

## 2021-05-04 NOTE — PROGRESS NOTES
HPI:   Eugene Anthony is a 80year old female who presents for a Medicare Subsequent Annual Wellness visit (Pt already had Initial Annual Wellness).     She had serious zoster outbreaik w/o significant pain in January, still has some symptoms, some days osteoarthritis of right hip     Cystoid macular edema of right eye     Gallbladder polyp     Liver lesion      Last Cholesterol Labs:   Lab Results   Component Value Date    CHOLEST 135 11/22/2019    HDL 49 11/22/2019    LDL 60 11/22/2019    TRIG 128 11/22 0.5 % Ophthalmic Solution, Place 1 drop into the right eye 2 (two) times daily. 6AM AND 6PM.  Meclizine HCl 12.5 MG Oral Tab, Take 12.5 mg by mouth 3 (three) times daily as needed.   Dorzolamide HCl-Timolol Mal 22.3-6.8 MG/ML Ophthalmic Solution, Place 1 dr GENERAL: feels well otherwise  LUNGS: denies shortness of breath with exertion.  HPI  CARDIOVASCULAR: denies chest pain on exertion, orthopena, palpitations, LIMA         EXAM:   /54 (BP Location: Left arm, Patient Position: Sitting, Cuff Size: adult labs    (E78.00) High cholesterol on statin, cpm  Plan: check labs    (R73.9) Hyperglycemia- she is not checking her sugars  Plan: check labs    (N32.81) OAB (overactive bladder) she refuses medications, she is up every 2 h at night  Plan: observe        ( take aspirin?: Yes    Have you had any immunizations at another office such as Influenza, Hepatitis B, Tetanus, or Pneumococcal?: Yes     Functional Ability     Bathing or Showering: Able without help    Toileting: Able without help    Dressing: Rajani Singh \"Ball\": Correct    Recall \"Flag\": Correct    Recall \"Tree\": Correct       This section provided for quick review of chart, separate sheet to patient  PREVENTATIVE SERVICES  INDICATIONS AND SCHEDULE Internal Lab or Procedure External Lab or Procedure Health Maintenance if applicable     Immunizations (Update Immunization Activity if applicable)     Influenza  Covered Annually 9/18/2020 Please get every year    Pneumococcal 13 (Prevnar)  Covered Once after 65 07/24/2015 Please get once after your 65th b Date Value   10/25/2017 6.0 (H)     HgbA1C (%)   Date Value   07/13/2020 6.1 (H)    No flowsheet data found.     Creat/alb ratio  Annually      LDL  Annually LDL Cholesterol (mg/dL)   Date Value   11/22/2019 60     LDL CHOLESTROL (mg/dL)   Date Value   04

## 2021-05-06 ENCOUNTER — OFFICE VISIT (OUTPATIENT)
Dept: PHYSICAL THERAPY | Facility: HOSPITAL | Age: 86
End: 2021-05-06
Attending: INTERNAL MEDICINE
Payer: MEDICARE

## 2021-05-06 PROCEDURE — 97110 THERAPEUTIC EXERCISES: CPT

## 2021-05-06 PROCEDURE — 97163 PT EVAL HIGH COMPLEX 45 MIN: CPT

## 2021-05-13 ENCOUNTER — OFFICE VISIT (OUTPATIENT)
Dept: PHYSICAL THERAPY | Facility: HOSPITAL | Age: 86
End: 2021-05-13
Attending: INTERNAL MEDICINE
Payer: MEDICARE

## 2021-05-13 PROCEDURE — 97110 THERAPEUTIC EXERCISES: CPT

## 2021-05-13 PROCEDURE — 97140 MANUAL THERAPY 1/> REGIONS: CPT

## 2021-05-17 NOTE — PROGRESS NOTES
Brina YEAGER shoulder sprain  Gi - balance; h/o falls         Authorized # of Visits:  Medicare 12         Next MD visit: none scheduled  Fall Risk: high        Precautions: n/a             Subjective: Pt. States her shoulder is 5/10 pain. Charges: Mitchell 2( 30 min) MT ( 15min)   Total Timed Treatment: 45 min     Total Treatment Time: 50 min

## 2021-05-17 NOTE — PROGRESS NOTES
FALL SCREEN EVALUATION   Referring Physician: Dr. Jesu Montenegro and Dr. Kristen Banda  Diagnosis: Kristen Banda - SHOBHA shoulder sprain  [place] - balance; h/o falls    Date of Service: 5/6/2021     PATIENT SUMMARY   Jerrod Lloyd is a 80year old female who presen Diagnosis Date   • Acute sinusitis 11/18/08   • Atrial fibrillation [facility])    • Benign paroxysmal vertigo 6/28/10   • Breast lump 8/07    lower right breast   • [facility] 6/23/2015   • Cervical spondylosis 8/29/2011   • Cervical strain 8/29/201 Signs and symptoms are consistent with diagnosis of possible partial RTC tear L shoulder and core,back, hip and B knee weakness resulting in impaired gait and balance. Pt and PT discussed evaluation findings, pathology, POC and HEP.   Pt voiced understandin antalgia, decreased step length R, decreased stance phase R, decreased hip/knee flex or ext R > L, decreased foot clearance R, decreased arm swing, stooped posture/forward lean and path deviation with visual scanning    TUG (AD, time): 16 sec    Fall Risk: 1  (3)  Normal: Performs head turns smoothly with no change in gait. (2)  Mild Impairment: Preforms head turns smoothly with slight change in gait velocity, i.e., minor disruption to smooth gait path or uses walking aid.   (1) Moderate Impairment: Preforms Complexity decision making due to 3+ personal factors/comorbidities, 4+ body structures involved/activity limitations, and evolving symptoms including changing pain levels.   PLAN OF CARE:    Goals: (to be met in 12 visits)  · Pt will demonstrate improved S referral. Please co-sign or sign and return this letter via fax as soon as possible to [phone].  If you have any questions, please contact me at Dept: [phone]    Sincerely,  Electronically signed by therapist: Lee Be PT  [facility]

## 2021-05-18 ENCOUNTER — OFFICE VISIT (OUTPATIENT)
Dept: PHYSICAL THERAPY | Facility: HOSPITAL | Age: 86
End: 2021-05-18
Attending: INTERNAL MEDICINE
Payer: MEDICARE

## 2021-05-18 PROCEDURE — 97140 MANUAL THERAPY 1/> REGIONS: CPT

## 2021-05-18 PROCEDURE — 97110 THERAPEUTIC EXERCISES: CPT

## 2021-05-19 NOTE — PROGRESS NOTES
Brina YEAGER shoulder sprain  Gi - balance; h/o falls         Authorized # of Visits:  Medicare 12         Next MD visit: none scheduled  Fall Risk: high        Precautions: n/a             Subjective: Pt. States her shoulder is a 3/10 pain tod in <14 seconds with least restrictive AD, demonstrating improved gait speed for improved participation in ADL such as community ambulation  · Pt will perform 4-Item DGI with score of 8/12 or greater with least restrictive AD to demonstrate ability to ambul

## 2021-05-20 ENCOUNTER — OFFICE VISIT (OUTPATIENT)
Dept: PHYSICAL THERAPY | Facility: HOSPITAL | Age: 86
End: 2021-05-20
Attending: INTERNAL MEDICINE
Payer: MEDICARE

## 2021-05-20 ENCOUNTER — TELEPHONE (OUTPATIENT)
Dept: INTERNAL MEDICINE CLINIC | Facility: CLINIC | Age: 86
End: 2021-05-20

## 2021-05-20 DIAGNOSIS — J34.2 NOSE SEPTUM DEVIATION: Primary | ICD-10-CM

## 2021-05-20 PROCEDURE — 97110 THERAPEUTIC EXERCISES: CPT

## 2021-05-20 PROCEDURE — 97140 MANUAL THERAPY 1/> REGIONS: CPT

## 2021-05-20 NOTE — TELEPHONE ENCOUNTER
Pt called and would like to have a recommendation to an ENT in [place]  for her left nostril, Pt stated that it is getting increasingly more clogged up every day.  Pt has seen Dr Yaz Moreno in the past and wonders if he would be appropriate to see him with t

## 2021-05-24 NOTE — PROGRESS NOTES
Dx Micky Kocher - L shoulder sprain  Gi - balance; h/o falls         Authorized # of Visits:  Medicare 12         Next MD visit: none scheduled  Fall Risk: high        Precautions: n/a             Subjective: Pt. States her shoulder is a 3/10 pain tod loss of stability  · Pt will improve functional hip strength by 1/2 grade to demonstrate ability to ascend/descend 1 flight of stairs reciprocally without use of handrail  · Pt will improve L shoulder flexion to 140 degrees to allow patient to reach into o

## 2021-05-24 NOTE — TELEPHONE ENCOUNTER
Referral placed  Patient notified;   Pt verbalized she has Dr. Nury Morris below info      Edna Toth MD  [address] [address] [phone]

## 2021-05-24 NOTE — TELEPHONE ENCOUNTER
Please see note below. Pt requesting referral to ENT for worsening nasal congestion. Pt asked if appropriate to see Dr Sydney Bonilla for this or someone else. Recent h/o open fracture of the nasal arch, septum deviation, and fracture of the maxilla.

## 2021-05-25 ENCOUNTER — OFFICE VISIT (OUTPATIENT)
Dept: PHYSICAL THERAPY | Facility: HOSPITAL | Age: 86
End: 2021-05-25
Attending: INTERNAL MEDICINE
Payer: MEDICARE

## 2021-05-25 PROCEDURE — 97110 THERAPEUTIC EXERCISES: CPT

## 2021-05-25 PROCEDURE — 97140 MANUAL THERAPY 1/> REGIONS: CPT

## 2021-05-25 NOTE — PROGRESS NOTES
Brina Simmons - SHOBHA shoulder sprain  Gi - balance; h/o falls         Authorized # of Visits:  Medicare 12         Next MD visit: none scheduled  Fall Risk: high        Precautions: n/a             Subjective: Pt. States her shoulder is a 3/10 pain tod improved participation in ADL such as community ambulation  · Pt will perform 4-Item DGI with score of 8/12 or greater with least restrictive AD to demonstrate ability to ambulate safely in crowded and busy environments  · Pt will be able to squat to pick

## 2021-06-01 ENCOUNTER — OFFICE VISIT (OUTPATIENT)
Dept: PHYSICAL THERAPY | Facility: HOSPITAL | Age: 86
End: 2021-06-01
Attending: INTERNAL MEDICINE
Payer: MEDICARE

## 2021-06-01 PROCEDURE — 97110 THERAPEUTIC EXERCISES: CPT

## 2021-06-01 PROCEDURE — 97140 MANUAL THERAPY 1/> REGIONS: CPT

## 2021-06-01 NOTE — PROGRESS NOTES
Dx Felice Null - L shoulder sprain  Gi - balance; h/o falls         Authorized # of Visits:  Medicare 12         Next MD visit: none scheduled  Fall Risk: high        Precautions: n/a             Subjective: Pt. States her shoulder is feeling better. environments  · Pt will be able to squat to  light objects around the house without loss of stability  · Pt will improve functional hip strength by 1/2 grade to demonstrate ability to ascend/descend 1 flight of stairs reciprocally without use of lilly

## 2021-06-03 ENCOUNTER — APPOINTMENT (OUTPATIENT)
Dept: PHYSICAL THERAPY | Facility: HOSPITAL | Age: 86
End: 2021-06-03
Attending: INTERNAL MEDICINE
Payer: MEDICARE

## 2021-06-08 ENCOUNTER — APPOINTMENT (OUTPATIENT)
Dept: PHYSICAL THERAPY | Facility: HOSPITAL | Age: 86
End: 2021-06-08
Attending: INTERNAL MEDICINE
Payer: MEDICARE

## 2021-06-08 ENCOUNTER — TELEPHONE (OUTPATIENT)
Dept: INTERNAL MEDICINE CLINIC | Facility: CLINIC | Age: 86
End: 2021-06-08

## 2021-06-08 NOTE — TELEPHONE ENCOUNTER
Dr. Lee Ewing only    Spoke with pt   Stated her BP in last one hour has been as below  132/72  113/66  96/63  118/66    She denied HA,fever, chills, blurred vision, N/V, numbness or tingling    She stated her BP is getting better  It was elevated during C

## 2021-06-08 NOTE — TELEPHONE ENCOUNTER
Pt calling about her BP and said that during her cataract surgery pt BP was 190/80- it did start going down but since the surgery the pt said that her BP has been bouncing around.  Pt states   6/4/21- 147/70    6/5/21- 152/71  Afternoon- 149/66    6/7/21- 1

## 2021-06-09 ENCOUNTER — HOSPITAL ENCOUNTER (OUTPATIENT)
Dept: ULTRASOUND IMAGING | Facility: HOSPITAL | Age: 86
Discharge: HOME | End: 2021-06-09
Attending: INTERNAL MEDICINE
Payer: MEDICARE

## 2021-06-09 DIAGNOSIS — K76.9 LIVER LESION: ICD-10-CM

## 2021-06-09 PROCEDURE — 76700 US EXAM ABDOM COMPLETE: CPT | Performed by: INTERNAL MEDICINE

## 2021-06-14 ENCOUNTER — OFFICE VISIT (OUTPATIENT)
Dept: PHYSICAL THERAPY | Facility: HOSPITAL | Age: 86
End: 2021-06-14
Attending: INTERNAL MEDICINE
Payer: MEDICARE

## 2021-06-14 PROCEDURE — 97110 THERAPEUTIC EXERCISES: CPT

## 2021-06-14 PROCEDURE — 97140 MANUAL THERAPY 1/> REGIONS: CPT

## 2021-06-14 NOTE — PROGRESS NOTES
Brina YEAGER shoulder sprain  Gi - balance; h/o falls         Authorized # of Visits:  Medicare 12         Next MD visit: none scheduled  Fall Risk: high        Precautions: n/a             Subjective: Pt. Cleared to resume PT following her cat able to squat to  light objects around the house without loss of stability  · Pt will improve functional hip strength by 1/2 grade to demonstrate ability to ascend/descend 1 flight of stairs reciprocally without use of handrail  · Pt will improve L

## 2021-06-15 ENCOUNTER — OFFICE VISIT (OUTPATIENT)
Dept: INTERNAL MEDICINE CLINIC | Facility: CLINIC | Age: 86
End: 2021-06-15
Payer: MEDICARE

## 2021-06-15 VITALS
OXYGEN SATURATION: 100 % | RESPIRATION RATE: 14 BRPM | DIASTOLIC BLOOD PRESSURE: 72 MMHG | BODY MASS INDEX: 28.23 KG/M2 | TEMPERATURE: 98 F | HEIGHT: 56 IN | SYSTOLIC BLOOD PRESSURE: 124 MMHG | WEIGHT: 125.5 LBS | HEART RATE: 59 BPM

## 2021-06-15 DIAGNOSIS — K82.4 GALLBLADDER POLYP: Primary | ICD-10-CM

## 2021-06-15 DIAGNOSIS — K76.9 LIVER LESION: ICD-10-CM

## 2021-06-15 PROCEDURE — 99213 OFFICE O/P EST LOW 20 MIN: CPT | Performed by: INTERNAL MEDICINE

## 2021-06-15 RX ORDER — FLUTICASONE PROPIONATE 50 MCG
2 SPRAY, SUSPENSION (ML) NASAL DAILY
COMMUNITY
Start: 2021-05-26

## 2021-06-15 NOTE — PROGRESS NOTES
Edmund Nguyễn is a 80year old female.  To F/U from last visit regarding GB polyp and liver lesion and hepatic cyst  HPI:    Interim history:she had her f/u US and the cyst could not be seen  Liver lesion likely focal steatosis  and GB polyp 3mm, both unc Yes      Alcohol/week: 0.0 - 0.8 standard drinks      Comment: socially    Drug use: No     Patient Active Problem List:     Cervical spondylosis     Paroxysmal atrial fibrillation (HCC)     Diastolic dysfunction     Mild aortic insufficiency     Hyperlipi a 2.5 cm echogenic area again noted adjacent to the gallbladder, not significantly changed since 3/5/2021.   This most likely represents focal steatosis, however, if the patient has any risk factors for hepatic malignancy, a contrast-enhanced liver MRI woul

## 2021-06-23 ENCOUNTER — OFFICE VISIT (OUTPATIENT)
Dept: PHYSICAL THERAPY | Facility: HOSPITAL | Age: 86
End: 2021-06-23
Attending: INTERNAL MEDICINE
Payer: MEDICARE

## 2021-06-23 PROCEDURE — 97140 MANUAL THERAPY 1/> REGIONS: CPT

## 2021-06-23 PROCEDURE — 97110 THERAPEUTIC EXERCISES: CPT

## 2021-06-23 NOTE — PROGRESS NOTES
Brina Carreno Phi - L shoulder sprain  Gi - balance; h/o falls         Authorized # of Visits:  Medicare 12         Next MD visit: none scheduled  Fall Risk: high        Precautions: n/a             Subjective: Pt. Presents today and states her R hip ha will demonstrate improved SLS to >10 seconds FIDE to promote safety and decrease risk of falls on uneven surfaces such as grass  · Pt will perform TUG in <14 seconds with least restrictive AD, demonstrating improved gait speed for improved participation in

## 2021-06-28 ENCOUNTER — OFFICE VISIT (OUTPATIENT)
Dept: PHYSICAL THERAPY | Facility: HOSPITAL | Age: 86
End: 2021-06-28
Attending: INTERNAL MEDICINE
Payer: MEDICARE

## 2021-06-28 PROCEDURE — 97110 THERAPEUTIC EXERCISES: CPT

## 2021-06-28 PROCEDURE — 97140 MANUAL THERAPY 1/> REGIONS: CPT

## 2021-06-28 NOTE — PROGRESS NOTES
Dx Jerry Camara - L shoulder sprain  Gi - balance; h/o falls         Authorized # of Visits:  Medicare 12         Next MD visit: none scheduled  Fall Risk: high        Precautions: n/a             Subjective: Shoulder bothering her more today.  Wants t ambulation  · Pt will perform 4-Item DGI with score of 8/12 or greater with least restrictive AD to demonstrate ability to ambulate safely in crowded and busy environments  · Pt will be able to squat to  light objects around the house without loss o

## 2021-06-30 ENCOUNTER — APPOINTMENT (OUTPATIENT)
Dept: PHYSICAL THERAPY | Facility: HOSPITAL | Age: 86
End: 2021-06-30
Attending: INTERNAL MEDICINE
Payer: MEDICARE

## 2021-07-06 ENCOUNTER — TELEPHONE (OUTPATIENT)
Dept: PHYSICAL THERAPY | Facility: HOSPITAL | Age: 86
End: 2021-07-06

## 2021-07-06 ENCOUNTER — APPOINTMENT (OUTPATIENT)
Dept: PHYSICAL THERAPY | Facility: HOSPITAL | Age: 86
End: 2021-07-06
Attending: INTERNAL MEDICINE
Payer: MEDICARE

## 2021-07-08 ENCOUNTER — OFFICE VISIT (OUTPATIENT)
Dept: PHYSICAL THERAPY | Facility: HOSPITAL | Age: 86
End: 2021-07-08
Attending: INTERNAL MEDICINE
Payer: MEDICARE

## 2021-07-08 ENCOUNTER — TELEPHONE (OUTPATIENT)
Dept: INTERNAL MEDICINE CLINIC | Facility: CLINIC | Age: 86
End: 2021-07-08

## 2021-07-08 PROCEDURE — 97140 MANUAL THERAPY 1/> REGIONS: CPT

## 2021-07-08 PROCEDURE — 97110 THERAPEUTIC EXERCISES: CPT

## 2021-07-08 NOTE — PROGRESS NOTES
Brina YEAGER shoulder sprain  Gi - balance; h/o falls         Authorized # of Visits:  Medicare 12         Next MD visit: none scheduled  Fall Risk: high        Precautions: n/a             Subjective: Pt. States she would like to continue with restrictive AD, demonstrating improved gait speed for improved participation in ADL such as community ambulation  · Pt will perform 4-Item DGI with score of 8/12 or greater with least restrictive AD to demonstrate ability to ambulate safely in crowded and

## 2021-07-13 ENCOUNTER — OFFICE VISIT (OUTPATIENT)
Dept: PHYSICAL THERAPY | Facility: HOSPITAL | Age: 86
End: 2021-07-13
Attending: INTERNAL MEDICINE
Payer: MEDICARE

## 2021-07-13 PROCEDURE — 97140 MANUAL THERAPY 1/> REGIONS: CPT

## 2021-07-13 PROCEDURE — 97110 THERAPEUTIC EXERCISES: CPT

## 2021-07-13 NOTE — PROGRESS NOTES
Brina YEAGER shoulder sprain  Gi - balance; h/o falls         Authorized # of Visits:  Medicare 12         Next MD visit: none scheduled  Fall Risk: high        Precautions: n/a             Subjective: Pt. States her R knee gave out the other d gait speed for improved participation in ADL such as community ambulation  · Pt will perform 4-Item DGI with score of 8/12 or greater with least restrictive AD to demonstrate ability to ambulate safely in crowded and busy environments  · Pt will be able to

## 2021-07-15 ENCOUNTER — OFFICE VISIT (OUTPATIENT)
Dept: PHYSICAL THERAPY | Facility: HOSPITAL | Age: 86
End: 2021-07-15
Attending: INTERNAL MEDICINE
Payer: MEDICARE

## 2021-07-15 PROCEDURE — 97140 MANUAL THERAPY 1/> REGIONS: CPT

## 2021-07-15 PROCEDURE — 97110 THERAPEUTIC EXERCISES: CPT

## 2021-07-19 NOTE — PROGRESS NOTES
Dear Dr. Gabriella Marsh MD, Dr. Bryce Brown,    This letter is to inform you of Linda Mcelroy in Physical Therapy at [place] [address] and Sports Medicine.     DX:Jose Francisco YEAGER shoulder sprain  Gi jimenez y/o Men: 15, Women: 16   78-77 y/o Men: 15, Women: 6   76-68 y/o Men: 15, Women: 8   71-76 y/o Men: 6, Women: 8   80-81 y/o Men: 8, Women: 5   80-81 y/o Men: 6, Women: 8   80-81 y/o Men 7, Women: 4]    Gait: pt ambulates on level ground with antalgia, significant gait deviations, or changes speed but loses balance but is able to recover and continue walking.  (0)  Severe Impairment: Cannot change speeds, or loses balance and has to reach for wall or be caught.     3. Gait with horizontal head turns: 2  ( without loss of stability. MET  · Pt will improve functional hip strength by 1/2 grade to demonstrate ability to ascend/descend 1 flight of stairs reciprocally without use of handrail.  MET  · Pt will improve L shoulder flexion to 140 degrees to allow patie

## 2021-07-20 ENCOUNTER — LAB ENCOUNTER (OUTPATIENT)
Dept: LAB | Age: 86
End: 2021-07-20
Attending: INTERNAL MEDICINE
Payer: MEDICARE

## 2021-07-20 DIAGNOSIS — R31.29 MICROHEMATURIA: ICD-10-CM

## 2021-07-20 DIAGNOSIS — E78.2 MIXED HYPERLIPIDEMIA: ICD-10-CM

## 2021-07-20 DIAGNOSIS — I10 ESSENTIAL HYPERTENSION: ICD-10-CM

## 2021-07-20 DIAGNOSIS — R73.03 PRE-DIABETES: ICD-10-CM

## 2021-07-20 DIAGNOSIS — I48.0 PAF (PAROXYSMAL ATRIAL FIBRILLATION) (HCC): ICD-10-CM

## 2021-07-20 DIAGNOSIS — I35.1 MILD AORTIC INSUFFICIENCY: ICD-10-CM

## 2021-07-20 LAB
ALBUMIN SERPL-MCNC: 3.8 G/DL (ref 3.4–5)
ALBUMIN/GLOB SERPL: 1.1 {RATIO} (ref 1–2)
ALP LIVER SERPL-CCNC: 75 U/L
ALT SERPL-CCNC: 27 U/L
ANION GAP SERPL CALC-SCNC: 3 MMOL/L (ref 0–18)
AST SERPL-CCNC: 24 U/L (ref 15–37)
BILIRUB SERPL-MCNC: 0.4 MG/DL (ref 0.1–2)
BILIRUB UR QL STRIP.AUTO: NEGATIVE
BUN BLD-MCNC: 6 MG/DL (ref 7–18)
BUN/CREAT SERPL: 9.8 (ref 10–20)
CALCIUM BLD-MCNC: 9.1 MG/DL (ref 8.5–10.1)
CHLORIDE SERPL-SCNC: 107 MMOL/L (ref 98–112)
CHOLEST SMN-MCNC: 139 MG/DL (ref ?–200)
CO2 SERPL-SCNC: 28 MMOL/L (ref 21–32)
COLOR UR AUTO: YELLOW
CREAT BLD-MCNC: 0.61 MG/DL
DIGOXIN SERPL-MCNC: 2.04 NG/ML (ref 0.8–2)
EST. AVERAGE GLUCOSE BLD GHB EST-MCNC: 126 MG/DL (ref 68–126)
GLOBULIN PLAS-MCNC: 3.6 G/DL (ref 2.8–4.4)
GLUCOSE BLD-MCNC: 107 MG/DL (ref 70–99)
GLUCOSE UR STRIP.AUTO-MCNC: NEGATIVE MG/DL
HBA1C MFR BLD HPLC: 6 % (ref ?–5.7)
HDLC SERPL-MCNC: 44 MG/DL (ref 40–59)
KETONES UR STRIP.AUTO-MCNC: NEGATIVE MG/DL
LDLC SERPL CALC-MCNC: 62 MG/DL (ref ?–100)
M PROTEIN MFR SERPL ELPH: 7.4 G/DL (ref 6.4–8.2)
NITRITE UR QL STRIP.AUTO: NEGATIVE
NONHDLC SERPL-MCNC: 95 MG/DL (ref ?–130)
OSMOLALITY SERPL CALC.SUM OF ELEC: 284 MOSM/KG (ref 275–295)
PATIENT FASTING Y/N/NP: YES
PATIENT FASTING Y/N/NP: YES
PH UR STRIP.AUTO: 7 [PH] (ref 5–8)
POTASSIUM SERPL-SCNC: 4.7 MMOL/L (ref 3.5–5.1)
PROT UR STRIP.AUTO-MCNC: NEGATIVE MG/DL
SODIUM SERPL-SCNC: 138 MMOL/L (ref 136–145)
SP GR UR STRIP.AUTO: 1.01 (ref 1–1.03)
TRIGL SERPL-MCNC: 201 MG/DL (ref 30–149)
UROBILINOGEN UR STRIP.AUTO-MCNC: <2 MG/DL
VLDLC SERPL CALC-MCNC: 30 MG/DL (ref 0–30)

## 2021-07-20 PROCEDURE — 80061 LIPID PANEL: CPT

## 2021-07-20 PROCEDURE — 83036 HEMOGLOBIN GLYCOSYLATED A1C: CPT

## 2021-07-20 PROCEDURE — 81001 URINALYSIS AUTO W/SCOPE: CPT

## 2021-07-20 PROCEDURE — 80053 COMPREHEN METABOLIC PANEL: CPT

## 2021-07-20 PROCEDURE — 36415 COLL VENOUS BLD VENIPUNCTURE: CPT

## 2021-07-20 PROCEDURE — 80162 ASSAY OF DIGOXIN TOTAL: CPT

## 2021-07-20 NOTE — PROGRESS NOTES
Spoke to pt. Made aware of results & recommendations. Pt voiced understanding.   Referral placed  Pt denied any UTI symptoms  Urologist info given to pt    Chapincito Romo MD   [address] [place] [phone]

## 2021-07-30 ENCOUNTER — TELEPHONE (OUTPATIENT)
Dept: INTERNAL MEDICINE CLINIC | Facility: CLINIC | Age: 86
End: 2021-07-30

## 2021-07-30 NOTE — TELEPHONE ENCOUNTER
Dr Ivan Zhang suggested patient to see a urologist and has an appt Dr Zonia Gilford on September 8th. An appointment was offered to her with a doctor within the same practice but she is in [place] and pt refused. Should she be seen sooner in a different practice?   Pl

## 2021-08-02 ENCOUNTER — TELEPHONE (OUTPATIENT)
Dept: PHYSICAL THERAPY | Facility: HOSPITAL | Age: 86
End: 2021-08-02

## 2021-08-11 ENCOUNTER — HOSPITAL ENCOUNTER (OUTPATIENT)
Dept: CV DIAGNOSTICS | Facility: HOSPITAL | Age: 86
Discharge: HOME | End: 2021-08-11
Attending: INTERNAL MEDICINE
Payer: MEDICARE

## 2021-08-11 DIAGNOSIS — I10 ESSENTIAL HYPERTENSION: ICD-10-CM

## 2021-08-11 DIAGNOSIS — I48.0 PAF (PAROXYSMAL ATRIAL FIBRILLATION) (HCC): ICD-10-CM

## 2021-08-11 DIAGNOSIS — I35.1 MILD AORTIC INSUFFICIENCY: ICD-10-CM

## 2021-08-11 DIAGNOSIS — E78.2 MIXED HYPERLIPIDEMIA: ICD-10-CM

## 2021-08-11 PROCEDURE — 93306 TTE W/DOPPLER COMPLETE: CPT | Performed by: INTERNAL MEDICINE

## 2021-08-31 ENCOUNTER — OFFICE VISIT (OUTPATIENT)
Dept: PHYSICAL THERAPY | Facility: HOSPITAL | Age: 86
End: 2021-08-31
Attending: ORTHOPAEDIC SURGERY
Payer: MEDICARE

## 2021-08-31 DIAGNOSIS — M16.11 PRIMARY OSTEOARTHRITIS OF RIGHT HIP: ICD-10-CM

## 2021-08-31 PROCEDURE — 97163 PT EVAL HIGH COMPLEX 45 MIN: CPT

## 2021-08-31 PROCEDURE — 97110 THERAPEUTIC EXERCISES: CPT

## 2021-08-31 NOTE — PROGRESS NOTES
SPINE EVALUATION:   Referring Physician: Dr. Sarah Escamilla  Diagnosis:  R hip OA; balance   Date of Service: 8/31/21     PATIENT Jaqueline Jhaveri is a 80year old female who presents to therapy today with complaints of R hip pain OA with leg giving out 2 t impairment     GLASSES       Pt denies diplopia, dysarthria, dysphasia, dizziness, drop attacks, bowel/bladder changes, saddle anesthesia, and FIDE LE N/T.    ASSESSMENT  Angélica Musa presents to physical therapy evaluation with primary c/o R hip pain.  The results major  Hamstrings: R -30; L -25  Piriformis: R major; L major  Quads: R mod; L mod  Gastroc-soleus: R mild; L mild     Special tests:   (+) R catherine, (+) R slump test; Repeated lumbar flexion = stretching, no radicular pain; Repeated lumbar extension = incr stress on lumbar/pelvic region. 7. Patient to be independent with HEP, joint protection principles, improved posture and body mechanics. Frequency / Duration: Patient will be seen for 2 x/week or a total of 10 visits over a 90 day period.  Treatment

## 2021-09-07 ENCOUNTER — OFFICE VISIT (OUTPATIENT)
Dept: PHYSICAL THERAPY | Facility: HOSPITAL | Age: 86
End: 2021-09-07
Attending: ORTHOPAEDIC SURGERY
Payer: MEDICARE

## 2021-09-07 PROCEDURE — 97110 THERAPEUTIC EXERCISES: CPT

## 2021-09-08 ENCOUNTER — OFFICE VISIT (OUTPATIENT)
Dept: SURGERY | Facility: CLINIC | Age: 86
End: 2021-09-08
Payer: MEDICARE

## 2021-09-08 DIAGNOSIS — R35.0 URINARY FREQUENCY: ICD-10-CM

## 2021-09-08 DIAGNOSIS — R82.90 URINE FINDING: ICD-10-CM

## 2021-09-08 DIAGNOSIS — R31.29 MICROSCOPIC HEMATURIA: Primary | ICD-10-CM

## 2021-09-08 LAB
APPEARANCE: CLEAR
BILIRUBIN: NEGATIVE
GLUCOSE (URINE DIPSTICK): NEGATIVE MG/DL
KETONES (URINE DIPSTICK): NEGATIVE MG/DL
LEUKOCYTES: NEGATIVE
MULTISTIX LOT#: ABNORMAL NUMERIC
NITRITE, URINE: NEGATIVE
PH, URINE: 5.5 (ref 4.5–8)
PROTEIN (URINE DIPSTICK): NEGATIVE MG/DL
SPECIFIC GRAVITY: >=1.03 (ref 1–1.03)
URINE-COLOR: YELLOW
UROBILINOGEN,SEMI-QN: 0.2 MG/DL (ref 0–1.9)

## 2021-09-08 PROCEDURE — 99203 OFFICE O/P NEW LOW 30 MIN: CPT | Performed by: PHYSICIAN ASSISTANT

## 2021-09-08 PROCEDURE — 81003 URINALYSIS AUTO W/O SCOPE: CPT | Performed by: PHYSICIAN ASSISTANT

## 2021-09-08 NOTE — PROGRESS NOTES
Abiodun Agee , [address], [address] [facility]    Urology Consult Note    History of Present Illness:   Patient is a 80year old female with hx of afib on coumadin, DM, cataracts, and HTN who presents today for evaluation of microscopic hematuria at request o • [facility] 6/28/2001   • PONV (postoperative nausea and vomiting)    • Rectal bleed 1/23/2007   • Stress incontinence    • Unspecified essential hypertension    • Vertigo 7/10    episodic   • Visual impairment     GLASS Results   Component Value Date    WBC 6.0 03/31/2021    HGB 13.3 03/31/2021    .0 03/31/2021     Lab Results   Component Value Date     07/20/2021    K 4.7 07/20/2021     07/20/2021    CO2 28.0 07/20/2021    BUN 6 (L) 07/20/2021    GLU 1 08/15/2019     <10,000 cfu/ml Mixture of Gram positive organisms isolated - probable contamination.     URINECUL No Growth at 18-24 hrs. 05/28/2018    URINECUL No Growth 1 Day 06/22/2017    URINECUL 40,000 CFU/ML Escherichia coli (A) 06/10/2017    URINECUL normal. Wall thickness was normal.    Systolic function was vigorous. The estimated ejection fraction was    65-70%. No diagnostic evidence for regional wall motion abnormalities. 2. Mitral valve: Mildly calcified annulus. There was mild regurgitation.  3. normal range. There was no significant regurgitation. Pericardium:  There was no pericardial effusion. Aorta: Aortic root: The aortic root was normal. Pulmonary artery: The main pulmonary artery was normal-sized.  Systolic pressure was mildly increased, 2.7 - 3.8  LA ID/bsa, A-P                                  2.3   cm/m^2 1. 5 - 2.3  LA volume, S                            (H)     73    ml     22 - 52  LA volume/bsa, S                        (H)     50    ml/m^2 16 - 28  LA volume, ES, 1-p A4C Recent ultrasound imaging kidneys unremarkable. Reviewed importance of evaluation of bladder with cystoscopy. She would like to defer cystoscopy until later this year to address her other medical issues which I feel is reasonable.  Patient aware and agrees

## 2021-09-14 ENCOUNTER — OFFICE VISIT (OUTPATIENT)
Dept: PHYSICAL THERAPY | Facility: HOSPITAL | Age: 86
End: 2021-09-14
Attending: ORTHOPAEDIC SURGERY
Payer: MEDICARE

## 2021-09-14 PROCEDURE — 97110 THERAPEUTIC EXERCISES: CPT

## 2021-09-16 ENCOUNTER — OFFICE VISIT (OUTPATIENT)
Dept: PHYSICAL THERAPY | Facility: HOSPITAL | Age: 86
End: 2021-09-16
Attending: ORTHOPAEDIC SURGERY
Payer: MEDICARE

## 2021-09-16 PROCEDURE — 97110 THERAPEUTIC EXERCISES: CPT

## 2021-09-21 ENCOUNTER — OFFICE VISIT (OUTPATIENT)
Dept: PHYSICAL THERAPY | Facility: HOSPITAL | Age: 86
End: 2021-09-21
Attending: ORTHOPAEDIC SURGERY
Payer: MEDICARE

## 2021-09-21 PROCEDURE — 97110 THERAPEUTIC EXERCISES: CPT

## 2021-09-23 ENCOUNTER — OFFICE VISIT (OUTPATIENT)
Dept: PHYSICAL THERAPY | Facility: HOSPITAL | Age: 86
End: 2021-09-23
Attending: ORTHOPAEDIC SURGERY
Payer: MEDICARE

## 2021-09-23 PROCEDURE — 97110 THERAPEUTIC EXERCISES: CPT

## 2021-09-27 ENCOUNTER — TELEPHONE (OUTPATIENT)
Dept: PHYSICAL THERAPY | Facility: HOSPITAL | Age: 86
End: 2021-09-27

## 2021-09-28 ENCOUNTER — APPOINTMENT (OUTPATIENT)
Dept: PHYSICAL THERAPY | Facility: HOSPITAL | Age: 86
End: 2021-09-28
Attending: ORTHOPAEDIC SURGERY
Payer: MEDICARE

## 2021-09-29 NOTE — PROGRESS NOTES
Dx: R hip OA; balance         Authorized # of Visits:  Medicare 10         Next MD visit: none scheduled  Fall Risk: high       Precautions: n/a             Subjective: Pt. States no pain currently. Has felt R leg give out on her a couple times recently.  H

## 2021-09-30 ENCOUNTER — OFFICE VISIT (OUTPATIENT)
Dept: PHYSICAL THERAPY | Facility: HOSPITAL | Age: 86
End: 2021-09-30
Attending: ORTHOPAEDIC SURGERY
Payer: MEDICARE

## 2021-09-30 PROCEDURE — 97110 THERAPEUTIC EXERCISES: CPT

## 2021-09-30 NOTE — PROGRESS NOTES
Dx: R hip OA; balance         Authorized # of Visits:  Medicare 10         Next MD visit: none scheduled  Fall Risk: high       Precautions: n/a             Subjective: No pain. Doing HEP daily. Using cane inside home and RW when outside of home.       Brad Ugalde

## 2021-10-04 NOTE — PROGRESS NOTES
Dx: R hip OA; balance         Authorized # of Visits:  Medicare 10         Next MD visit: none scheduled  Fall Risk: high       Precautions: n/a             Subjective: No new complaints. Objective: Refer to flow sheet.  Progressed balance and B LE stre

## 2021-10-05 NOTE — PROGRESS NOTES
Dx: R hip OA; balance         Authorized # of Visits:  Medicare 10         Next MD visit: none scheduled  Fall Risk: high       Precautions: n/a             Subjective: Pt. States she is doing well with exercises at home.  Walking without AD inside home and

## 2021-10-07 ENCOUNTER — OFFICE VISIT (OUTPATIENT)
Dept: PHYSICAL THERAPY | Facility: HOSPITAL | Age: 86
End: 2021-10-07
Attending: INTERNAL MEDICINE
Payer: MEDICARE

## 2021-10-07 PROCEDURE — 97110 THERAPEUTIC EXERCISES: CPT

## 2021-10-11 NOTE — PROGRESS NOTES
Dx: R hip OA; balance         Authorized # of Visits:  Medicare 10         Next MD visit: none scheduled  Fall Risk: high       Precautions: n/a             Subjective: Pt. Reports no pain in her hip. Pt. States she is doing well with exercises at home.  Wa Progress balance and LE strengthening as tolerated.       Charges: Mitchell 3( 45 min) Total Timed Treatment: 45 min     Total Treatment Time: 45 min

## 2021-10-11 NOTE — PROGRESS NOTES
Dear Dr. Marina Tomlin MD, Dr. Giselle Beck    This letter is to inform you of Zoya Rodney in Physical Therapy at [place] [address] and Sports Medicine.     DX: R hip OA; balance     TREATMENT #   8    Of    8 lifting. Met    2. Improve R hip ROM by 5 degrees in all planes to allow patient to change positions with more ease. Met    3. Improve SLS balance by 3 seconds B to decrease fall risk and improve safety at home. Met    4.  Improve B LE strength by 1/2 grad

## 2021-10-11 NOTE — PROGRESS NOTES
Dx: R hip OA; balance         Authorized # of Visits:  Medicare 10         Next MD visit: none scheduled  Fall Risk: high       Precautions: n/a             Subjective: Pt. States no hip pain. HEP going well. Using cane more than RW.  Feels more steady with principles, improved posture and body mechanics. Plan: Assess response to last session. Progress balance and LE strengthening as tolerated.       Charges: Mitchell 3( 45 min) Total Timed Treatment: 45 min     Total Treatment Time: 45 min

## 2021-12-08 ENCOUNTER — HOSPITAL ENCOUNTER (OUTPATIENT)
Dept: ULTRASOUND IMAGING | Age: 86
Discharge: HOME | End: 2021-12-08
Attending: INTERNAL MEDICINE
Payer: MEDICARE

## 2021-12-08 DIAGNOSIS — K82.4 GALLBLADDER POLYP: Primary | ICD-10-CM

## 2021-12-08 DIAGNOSIS — K82.4 GALLBLADDER POLYP: ICD-10-CM

## 2021-12-08 DIAGNOSIS — K76.9 LIVER LESION: ICD-10-CM

## 2021-12-08 PROCEDURE — 76700 US EXAM ABDOM COMPLETE: CPT | Performed by: INTERNAL MEDICINE

## 2021-12-08 NOTE — PROGRESS NOTES
Patient MyChart result sent, patient viewed. Will send separate mychart message as well.    Repeat us of abd ordered

## 2021-12-16 ENCOUNTER — OFFICE VISIT (OUTPATIENT)
Dept: INTERNAL MEDICINE CLINIC | Facility: CLINIC | Age: 86
End: 2021-12-16
Payer: MEDICARE

## 2021-12-16 VITALS
WEIGHT: 128.63 LBS | HEART RATE: 57 BPM | HEIGHT: 56 IN | SYSTOLIC BLOOD PRESSURE: 136 MMHG | TEMPERATURE: 97 F | BODY MASS INDEX: 28.94 KG/M2 | RESPIRATION RATE: 14 BRPM | DIASTOLIC BLOOD PRESSURE: 60 MMHG | OXYGEN SATURATION: 100 %

## 2021-12-16 DIAGNOSIS — E78.2 MIXED HYPERLIPIDEMIA: ICD-10-CM

## 2021-12-16 DIAGNOSIS — I10 ESSENTIAL HYPERTENSION: ICD-10-CM

## 2021-12-16 DIAGNOSIS — R73.03 PRE-DIABETES: ICD-10-CM

## 2021-12-16 DIAGNOSIS — R31.29 MICROHEMATURIA: ICD-10-CM

## 2021-12-16 DIAGNOSIS — K82.4 GALLBLADDER POLYP: ICD-10-CM

## 2021-12-16 DIAGNOSIS — B02.29 PHN (POSTHERPETIC NEURALGIA): ICD-10-CM

## 2021-12-16 DIAGNOSIS — N90.7 VULVAR CYSTS: Primary | ICD-10-CM

## 2021-12-16 DIAGNOSIS — K76.9 LIVER LESION: ICD-10-CM

## 2021-12-16 PROBLEM — Z96.651 STATUS POST TOTAL RIGHT KNEE REPLACEMENT: Status: RESOLVED | Noted: 2019-02-15 | Resolved: 2021-12-16

## 2021-12-16 PROCEDURE — 87086 URINE CULTURE/COLONY COUNT: CPT | Performed by: INTERNAL MEDICINE

## 2021-12-16 PROCEDURE — 99214 OFFICE O/P EST MOD 30 MIN: CPT | Performed by: INTERNAL MEDICINE

## 2021-12-16 PROCEDURE — 81001 URINALYSIS AUTO W/SCOPE: CPT | Performed by: INTERNAL MEDICINE

## 2021-12-16 PROCEDURE — 81003 URINALYSIS AUTO W/O SCOPE: CPT | Performed by: INTERNAL MEDICINE

## 2021-12-16 RX ORDER — TRAMADOL HYDROCHLORIDE 50 MG/1
50 TABLET ORAL EVERY 6 HOURS PRN
Qty: 30 TABLET | Refills: 0 | Status: SHIPPED | OUTPATIENT
Start: 2021-12-16

## 2021-12-16 RX ORDER — BEVACIZUMAB 100 MG/4ML
1.25 INJECTION, SOLUTION INTRAVENOUS
COMMUNITY
Start: 2021-10-14

## 2021-12-16 NOTE — PROGRESS NOTES
Taylor Yeboah is a 80year old female. To F/U from last visit regarding HTN,DL, chronic cough and med check  HPI:    Interim history:       She also has IGT, HTN and high cholesterol and bronchiestasis.  She has ongoing chronic cough and OAB    She has a tablet 3   • prednisoLONE acetate 1 % Ophthalmic Suspension Place 1 drop into both eyes 2 (two) times daily. 1   • Vitamin D3 2000 units Oral Cap Take 2,000 Units by mouth daily.      • ketorolac tromethamine 0.5 % Ophthalmic Solution Place 1 drop into b suspect normal, clitoral irritation-gyne  Microhematuria- check micro, encouraged pt to do cysto  Gallbladder polyp- stable, repeat 6 mos  Liver lesion- stable, repeat 6 mos  Essential hypertension- at goal, labs UTD, CPM  Pre-diabetes- A1C stable, repeat

## 2021-12-20 NOTE — PROGRESS NOTES
Spoke to pt. Made aware of results & recommendations. Pt voiced understanding.   Pt will call urologist tomorrow

## 2021-12-29 ENCOUNTER — TELEPHONE (OUTPATIENT)
Dept: SURGERY | Facility: CLINIC | Age: 86
End: 2021-12-29

## 2021-12-29 NOTE — TELEPHONE ENCOUNTER
RN called patient to offer 1/25/22 Tuesday 11:30AM for cystoscopy. She agreed to plans and verbalized understanding.

## 2021-12-29 NOTE — TELEPHONE ENCOUNTER
Pt called stating pt is over due to have the cystoscopy. Can pt schedule or should pt schedule office visit with pa prior to cystoscopy. Pt only wants to speak to the pa.  Please call

## 2022-01-25 ENCOUNTER — PROCEDURE (OUTPATIENT)
Dept: SURGERY | Facility: CLINIC | Age: 87
End: 2022-01-25
Payer: MEDICARE

## 2022-01-25 VITALS — HEART RATE: 69 BPM | DIASTOLIC BLOOD PRESSURE: 82 MMHG | SYSTOLIC BLOOD PRESSURE: 143 MMHG

## 2022-01-25 DIAGNOSIS — R31.29 MICROSCOPIC HEMATURIA: ICD-10-CM

## 2022-01-25 DIAGNOSIS — R82.90 URINE FINDING: Primary | ICD-10-CM

## 2022-01-25 LAB
APPEARANCE: CLEAR
BILIRUB UR QL: NEGATIVE
BILIRUBIN: NEGATIVE
COLOR UR: YELLOW
GLUCOSE (URINE DIPSTICK): NEGATIVE MG/DL
GLUCOSE UR-MCNC: NEGATIVE MG/DL
KETONES (URINE DIPSTICK): NEGATIVE MG/DL
KETONES UR-MCNC: NEGATIVE MG/DL
LEUKOCYTES: NEGATIVE
MULTISTIX LOT#: ABNORMAL NUMERIC
NITRITE UR QL STRIP.AUTO: NEGATIVE
NITRITE, URINE: NEGATIVE
PH UR: 6 [PH] (ref 5–8)
PH, URINE: 6 (ref 4.5–8)
PROT UR-MCNC: NEGATIVE MG/DL
PROTEIN (URINE DIPSTICK): NEGATIVE MG/DL
SP GR UR STRIP: 1.01 (ref 1–1.03)
SPECIFIC GRAVITY: 1.02 (ref 1–1.03)
URINE-COLOR: YELLOW
UROBILINOGEN UR STRIP-ACNC: <2
UROBILINOGEN,SEMI-QN: 0.2 MG/DL (ref 0–1.9)

## 2022-01-25 PROCEDURE — 52000 CYSTOURETHROSCOPY: CPT | Performed by: UROLOGY

## 2022-01-25 PROCEDURE — 81003 URINALYSIS AUTO W/O SCOPE: CPT | Performed by: UROLOGY

## 2022-01-25 RX ORDER — CIPROFLOXACIN 500 MG/1
500 TABLET, FILM COATED ORAL ONCE
Status: COMPLETED | OUTPATIENT
Start: 2022-01-25 | End: 2022-01-25

## 2022-01-25 RX ADMIN — CIPROFLOXACIN 500 MG: 500 TABLET, FILM COATED ORAL at 13:06:00

## 2022-01-25 NOTE — PROGRESS NOTES
Rooming Clinician:     Kara Frankel is a 80year old female. Patient presents with:  Procedure: Here for cystoscopy        HPI:     Patient comes to the office for cystoscopy. She has a history of asymptomatic microscopic hematuria.  Urinalysis shows eye 2 (two) times daily.     2   • ACCU-CHEK GREGORY PLUS In Vitro Strip TEST BLOOD DAILY 50 strip 0       Amoxicillin             SWELLING    Comment:Swollen tongue  Nitrofurantoin          DIARRHEA   Past Medical History:   Diagnosis Date   • Acute sinusiti denies any unusual skin lesions or rashes  RESPIRATORY: denies shortness of breath with exertion  CARDIOVASCULAR: denies chest pain on exertion  GI: denies abdominal pain and denies heartburn  : see HPI  NEURO: no sensory or motor complaint    EXAM: 07/20/2021     07/20/2021    CO2 28.0 07/20/2021     (H) 07/20/2021    CA 9.1 07/20/2021    ALB 3.8 07/20/2021    ALKPHO 75 07/20/2021    AST 24 07/20/2021    ALT 27 07/20/2021    PTT 40.9 (H) 03/31/2021    INR 1.7 (A) 01/11/2022       X-RAY[de-identified]

## 2022-02-01 ENCOUNTER — OFFICE VISIT (OUTPATIENT)
Dept: OBGYN CLINIC | Facility: CLINIC | Age: 87
End: 2022-02-01
Payer: MEDICARE

## 2022-02-01 VITALS
DIASTOLIC BLOOD PRESSURE: 83 MMHG | BODY MASS INDEX: 25.6 KG/M2 | HEIGHT: 59 IN | WEIGHT: 127 LBS | HEART RATE: 67 BPM | SYSTOLIC BLOOD PRESSURE: 134 MMHG

## 2022-02-01 DIAGNOSIS — N90.7 VULVAR CYSTS: Primary | ICD-10-CM

## 2022-02-01 DIAGNOSIS — N81.2 UTEROVAGINAL PROLAPSE, INCOMPLETE: ICD-10-CM

## 2022-02-01 DIAGNOSIS — N36.2 URETHRAL CARUNCLE: ICD-10-CM

## 2022-02-01 DIAGNOSIS — N76.3 CHRONIC VULVITIS: ICD-10-CM

## 2022-02-01 DIAGNOSIS — N95.2 VAGINAL ATROPHY: ICD-10-CM

## 2022-02-01 PROCEDURE — 99203 OFFICE O/P NEW LOW 30 MIN: CPT | Performed by: OBSTETRICS & GYNECOLOGY

## 2022-02-01 RX ORDER — NYSTATIN 100000 U/G
1 OINTMENT TOPICAL 2 TIMES DAILY
Qty: 30 G | Refills: 1 | Status: SHIPPED | OUTPATIENT
Start: 2022-02-01 | End: 2022-03-01

## 2022-02-01 NOTE — PATIENT INSTRUCTIONS
Vulvar care basics:    Cleaning:  -use plain warm (not hot) water (no soap) to wash if needed or can take Sitz bath (see below)   -use water, fingers, & only very gentle, fragrance-free, moisturizing soap       (e.g. Cetaphil or CeraVe hydrating or gentle cleansers meant for eczema/psoriasis)  -only pat dry gently (no rubbing)    Sitz baths:  -soothing and cleansing  -Get a Sitz Bath from [facility] or [employer]--fits over toilet, you can fill with water to soak vulva without having to get in bathtub  -Use 1-3 times per day for ten minutes at a time  -Pat dry, apply thin layer of vaseline to protect the skin    Protection/Prevention:  -goal is to maintain an intact, moist (non-dehydrated) skin barrier   -need to prevent irritation & over-drying of skin that can lead to micro-tears, cracking, and itching, pain, & bleeding.   -do not scratch or wipe hard (mechanical injury)   -avoid strong chemicals/fragrances (fragrance free soap & detergents, avoid fabric softeners)  -avoid other irritants (e.g. sweat, leaked urine, leaked stool)   -avoid excessive friction (no thongs, always use lubricant for intercourse, daily barrier cream or ointment)   -breathable underwear, change underwear if sweaty/wet, no underwear while sleeping if possible.    -DO USE a barrier product for protection        (e.g. Aquaphor, vaseline, A&D ointment, Zinc oxide, diaper rash cream) at least once daily    Treatment:  -BEST TREATMENT IS PREVENTION   -ointments are generally more potent than creams  -use just a thin layer  -during treatment (usually at least 2 wk) it is best to avoid intercourse to avoid trauma to the skin   -if diabetic or prone to yeast infections:          Start with internal (vaginal) AND external (vulvar) therapy         Monistat 7 vaginally AND Lotrimin (clotrimazole) twice daily at minimum 14-28 days          Both Monistat & Lotrimin are over the counter - talk to pharmacist if you need help         Often chronic therapy with clotrimazole or Nystatin (prescription) will be needed   -if diagnosed with chronic inflammatory skin condition (e.g. lichen sclerosis)          Most important is avoiding scratching & irritants         Work with gynecologist to form a steroid regimen for long term use         Often will need daily strong topical steroid (prescription) for 6-12 weeks. Best to try to taper down to 2-3 times per week or weekly if able & can increase use in a flare  -if sensitive/dry skin         Add back some oils &/or moisture on a regular basis         Coconut oil is pure (no irritants) & contains ceramides (fatty acids) that are              important for maintaining skin barrier         Hyaluronic acid (there are suppositories for intravaginal use, but you can use the              hyaluronic acid in facial care products as well)          Vaginal moisturizer products can be used topically as well (e.g. Replens, Rajni Rue)          Still recommend a barrier product in addition (on top of the above)     If you are tempted to scratch:  -place ice pack on area for 15-20 minutes & distract yourself.   -if needed can (sparingly) place a thin layer of lidocaine ointment or cream         (e.g. Bikini-zone, etc over the counter)  -can take an oral anti-histamine (e.g. Benadryl, Claritin, Zyrtec, etc)   -can also use a topical steroid (hydrocortisone ointment over the counter) for a few days        Take care - chronic steroid use can cause skin thinning & can worsen your problem. Lubricants    Aloe Cadabra  Good Clean Love  Restore  Pre-Seed (targeted for those attempting to conceive)     Please remember that if your condition is not improving, you may need a skin biopsy or   to see a dermatologist. Skin cancer can itch too, so we should always make sure we are   looking out/thinking about that as a possibility as well. Good luck!

## 2022-02-07 ENCOUNTER — TELEPHONE (OUTPATIENT)
Dept: OBGYN CLINIC | Facility: CLINIC | Age: 87
End: 2022-02-07

## 2022-02-07 NOTE — TELEPHONE ENCOUNTER
Pt has a question about the meds, too expensive, can we switch, also has samples, but wants to make sure she understands application correctly.  Pls call her back

## 2022-02-07 NOTE — TELEPHONE ENCOUNTER
Clarified pt questions on use of premarin on urethra vs vaginal application. Understanding verbalized. Pt would like a cheaper or OTC alternative for nystatin. Will route to MM for recommendation.

## 2022-02-28 ENCOUNTER — TELEPHONE (OUTPATIENT)
Dept: OBGYN CLINIC | Facility: CLINIC | Age: 87
End: 2022-02-28

## 2022-02-28 NOTE — TELEPHONE ENCOUNTER
Discussed anatomy, where urethra is. Option for OTC Lotrimin discussed. Pt. Alyssa Best. Understanding.

## 2022-03-22 ENCOUNTER — TELEPHONE (OUTPATIENT)
Dept: SURGERY | Facility: CLINIC | Age: 87
End: 2022-03-22

## 2022-03-22 NOTE — TELEPHONE ENCOUNTER
Patient requesting a call back. States some questions in regards to blood work and CT. Scheduled by 04/04/2022.  Please call after 4 pm. Please advise

## 2022-03-24 NOTE — TELEPHONE ENCOUNTER
RN called patient  Teresa Mondragon [phone] in response to her call. Note, patient was last seen by Dr Eunice Castorena on 1/25 (cystoscopy), ordered CT. Patient scheduled it on 4/4. Follow up on 4/19. All questions answered. She verbalized understanding to complete blood draw 1-2 days prior to the appt.

## 2022-03-24 NOTE — TELEPHONE ENCOUNTER
Per pt needs to know information about the blood test after cystoscopy. Per pt has a ct scheduled and needs blood work prior. Per pt has questions about the labs.  Please advise

## 2022-04-06 ENCOUNTER — PATIENT OUTREACH (OUTPATIENT)
Dept: CASE MANAGEMENT | Age: 87
End: 2022-04-06

## 2022-04-07 ENCOUNTER — TELEPHONE (OUTPATIENT)
Dept: INTERNAL MEDICINE CLINIC | Facility: CLINIC | Age: 87
End: 2022-04-07

## 2022-04-07 NOTE — TELEPHONE ENCOUNTER
Pt unsure to enroll into CCM services, sending letter with provider support of program to patient via mail. Address was verified during intro call.

## 2022-04-19 ENCOUNTER — OFFICE VISIT (OUTPATIENT)
Dept: SURGERY | Facility: CLINIC | Age: 87
End: 2022-04-19
Payer: MEDICARE

## 2022-04-19 DIAGNOSIS — R82.90 URINE FINDING: Primary | ICD-10-CM

## 2022-04-19 DIAGNOSIS — R31.29 MICROSCOPIC HEMATURIA: ICD-10-CM

## 2022-04-19 LAB
APPEARANCE: CLEAR
BILIRUB UR QL: NEGATIVE
BILIRUBIN: NEGATIVE
COLOR UR: YELLOW
GLUCOSE (URINE DIPSTICK): NEGATIVE MG/DL
GLUCOSE UR-MCNC: NEGATIVE MG/DL
MULTISTIX LOT#: ABNORMAL NUMERIC
NITRITE UR QL STRIP.AUTO: NEGATIVE
NITRITE, URINE: NEGATIVE
PH UR: 5 [PH] (ref 5–8)
PH, URINE: 5.5 (ref 4.5–8)
PROT UR-MCNC: NEGATIVE MG/DL
PROTEIN (URINE DIPSTICK): NEGATIVE MG/DL
SP GR UR STRIP: 1.01 (ref 1–1.03)
SPECIFIC GRAVITY: 1.02 (ref 1–1.03)
URINE-COLOR: YELLOW
UROBILINOGEN UR STRIP-ACNC: <2
UROBILINOGEN,SEMI-QN: 0.2 MG/DL (ref 0–1.9)
VIT C UR-MCNC: NEGATIVE MG/DL

## 2022-04-19 PROCEDURE — 81003 URINALYSIS AUTO W/O SCOPE: CPT | Performed by: UROLOGY

## 2022-04-19 PROCEDURE — 99213 OFFICE O/P EST LOW 20 MIN: CPT | Performed by: UROLOGY

## 2022-05-10 ENCOUNTER — HOSPITAL ENCOUNTER (OUTPATIENT)
Dept: CT IMAGING | Facility: HOSPITAL | Age: 87
Discharge: HOME | End: 2022-05-10
Attending: UROLOGY
Payer: MEDICARE

## 2022-05-10 DIAGNOSIS — R31.29 MICROSCOPIC HEMATURIA: ICD-10-CM

## 2022-05-10 LAB — CREAT BLD-MCNC: 0.7 MG/DL

## 2022-05-10 PROCEDURE — 74178 CT ABD&PLV WO CNTR FLWD CNTR: CPT | Performed by: UROLOGY

## 2022-05-10 PROCEDURE — 82565 ASSAY OF CREATININE: CPT

## 2022-05-10 PROCEDURE — 76377 3D RENDER W/INTRP POSTPROCES: CPT | Performed by: UROLOGY

## 2022-05-10 RX ORDER — IOHEXOL 350 MG/ML
100 INJECTION, SOLUTION INTRAVENOUS
Status: COMPLETED | OUTPATIENT
Start: 2022-05-10 | End: 2022-05-10

## 2022-05-10 RX ADMIN — IOHEXOL 100 ML: 350 INJECTION, SOLUTION INTRAVENOUS at 13:18:00

## 2022-05-10 NOTE — PROGRESS NOTES
Your recent CT urogram is normal.  Shows no evidence of any urologic abnormalities to account for microscopic hematuria. Okay to proceed with orthopedic surgery as scheduled since cystoscopy in January was unremarkable. Recommend follow up in the office as directed.     Sincerely,  Justin Buck MD

## 2022-05-12 ENCOUNTER — TELEPHONE (OUTPATIENT)
Dept: SURGERY | Facility: CLINIC | Age: 87
End: 2022-05-12

## 2022-06-07 ENCOUNTER — HOSPITAL ENCOUNTER (OUTPATIENT)
Dept: ULTRASOUND IMAGING | Facility: HOSPITAL | Age: 87
Discharge: HOME | End: 2022-06-07
Attending: INTERNAL MEDICINE
Payer: MEDICARE

## 2022-06-07 DIAGNOSIS — K76.9 LIVER LESION: ICD-10-CM

## 2022-06-07 DIAGNOSIS — K82.4 GALLBLADDER POLYP: ICD-10-CM

## 2022-06-07 PROCEDURE — 76700 US EXAM ABDOM COMPLETE: CPT | Performed by: INTERNAL MEDICINE

## 2022-06-08 ENCOUNTER — OFFICE VISIT (OUTPATIENT)
Dept: OBGYN CLINIC | Facility: CLINIC | Age: 87
End: 2022-06-08
Payer: MEDICARE

## 2022-06-08 VITALS
HEIGHT: 59 IN | DIASTOLIC BLOOD PRESSURE: 66 MMHG | BODY MASS INDEX: 25.8 KG/M2 | WEIGHT: 128 LBS | SYSTOLIC BLOOD PRESSURE: 126 MMHG | HEART RATE: 76 BPM

## 2022-06-08 DIAGNOSIS — N36.2 URETHRAL CARUNCLE: ICD-10-CM

## 2022-06-08 DIAGNOSIS — N76.3 CHRONIC VULVITIS: Primary | ICD-10-CM

## 2022-06-08 DIAGNOSIS — N95.2 VAGINAL ATROPHY: ICD-10-CM

## 2022-06-08 PROCEDURE — 99213 OFFICE O/P EST LOW 20 MIN: CPT | Performed by: OBSTETRICS & GYNECOLOGY

## 2022-06-08 RX ORDER — MOMETASONE FUROATE 1 MG/G
OINTMENT TOPICAL
Qty: 45 G | Refills: 2 | Status: SHIPPED | OUTPATIENT
Start: 2022-06-08

## 2022-06-16 ENCOUNTER — OFFICE VISIT (OUTPATIENT)
Dept: INTERNAL MEDICINE CLINIC | Facility: CLINIC | Age: 87
End: 2022-06-16
Payer: MEDICARE

## 2022-06-16 VITALS
HEIGHT: 57.87 IN | RESPIRATION RATE: 16 BRPM | BODY MASS INDEX: 26.83 KG/M2 | DIASTOLIC BLOOD PRESSURE: 58 MMHG | HEART RATE: 79 BPM | OXYGEN SATURATION: 99 % | SYSTOLIC BLOOD PRESSURE: 118 MMHG | TEMPERATURE: 97 F | WEIGHT: 127.81 LBS

## 2022-06-16 DIAGNOSIS — J47.9 BRONCHIECTASIS WITHOUT COMPLICATION (HCC): ICD-10-CM

## 2022-06-16 DIAGNOSIS — E78.2 MIXED HYPERLIPIDEMIA: ICD-10-CM

## 2022-06-16 DIAGNOSIS — R31.29 MICROHEMATURIA: ICD-10-CM

## 2022-06-16 DIAGNOSIS — K82.4 GALLBLADDER POLYP: ICD-10-CM

## 2022-06-16 DIAGNOSIS — I10 ESSENTIAL HYPERTENSION: ICD-10-CM

## 2022-06-16 DIAGNOSIS — H91.8X9 OTHER SPECIFIED FORMS OF HEARING LOSS, UNSPECIFIED LATERALITY: ICD-10-CM

## 2022-06-16 DIAGNOSIS — Z00.00 ENCOUNTER FOR ANNUAL HEALTH EXAMINATION: Primary | ICD-10-CM

## 2022-06-16 DIAGNOSIS — R73.03 PRE-DIABETES: ICD-10-CM

## 2022-06-16 PROBLEM — K76.9 LIVER LESION: Status: RESOLVED | Noted: 2021-05-04 | Resolved: 2022-06-16

## 2022-06-16 PROBLEM — N90.7 VULVAR CYSTS: Status: RESOLVED | Noted: 2022-02-01 | Resolved: 2022-06-16

## 2022-06-16 PROCEDURE — G0439 PPPS, SUBSEQ VISIT: HCPCS | Performed by: INTERNAL MEDICINE

## 2022-06-16 PROCEDURE — 1126F AMNT PAIN NOTED NONE PRSNT: CPT | Performed by: INTERNAL MEDICINE

## 2022-06-16 PROCEDURE — 99214 OFFICE O/P EST MOD 30 MIN: CPT | Performed by: INTERNAL MEDICINE

## 2022-07-07 LAB
ALBUMIN/GLOBULIN RATIO: 1.8 (CALC) (ref 1–2.5)
ALBUMIN: 4.4 G/DL (ref 3.6–5.1)
ALKALINE PHOSPHATASE: 79 U/L (ref 37–153)
ALT: 12 U/L (ref 6–29)
AST: 19 U/L (ref 10–35)
BILIRUBIN, TOTAL: 0.5 MG/DL (ref 0.2–1.2)
BUN: 13 MG/DL (ref 7–25)
CALCIUM: 9.6 MG/DL (ref 8.6–10.4)
CARBON DIOXIDE: 29 MMOL/L (ref 20–32)
CHLORIDE: 103 MMOL/L (ref 98–110)
CHOL/HDLC RATIO: 2.6 (CALC)
CHOLESTEROL, TOTAL: 140 MG/DL
CREATININE: 0.77 MG/DL (ref 0.6–0.88)
EGFR IF AFRICN AM: 80 ML/MIN/1.73M2
EGFR IF NONAFRICN AM: 69 ML/MIN/1.73M2
GLOBULIN: 2.4 G/DL (CALC) (ref 1.9–3.7)
GLUCOSE: 96 MG/DL (ref 65–99)
HDL CHOLESTEROL: 53 MG/DL
HEMOGLOBIN A1C: 5.9 % OF TOTAL HGB
LDL-CHOLESTEROL: 64 MG/DL (CALC)
NON-HDL CHOLESTEROL: 87 MG/DL (CALC)
POTASSIUM: 4.6 MMOL/L (ref 3.5–5.3)
PROTEIN, TOTAL: 6.8 G/DL (ref 6.1–8.1)
SODIUM: 137 MMOL/L (ref 135–146)
TRIGLYCERIDES: 142 MG/DL

## 2022-08-09 ENCOUNTER — OFFICE VISIT (OUTPATIENT)
Dept: INTERNAL MEDICINE CLINIC | Facility: CLINIC | Age: 87
End: 2022-08-09
Payer: MEDICARE

## 2022-08-09 VITALS
OXYGEN SATURATION: 100 % | RESPIRATION RATE: 14 BRPM | HEART RATE: 47 BPM | WEIGHT: 128.69 LBS | BODY MASS INDEX: 27.01 KG/M2 | TEMPERATURE: 98 F | SYSTOLIC BLOOD PRESSURE: 110 MMHG | DIASTOLIC BLOOD PRESSURE: 64 MMHG | HEIGHT: 57.87 IN

## 2022-08-09 DIAGNOSIS — R31.29 MICROHEMATURIA: ICD-10-CM

## 2022-08-09 DIAGNOSIS — S99.921A INJURY OF RIGHT FOOT, INITIAL ENCOUNTER: ICD-10-CM

## 2022-08-09 DIAGNOSIS — I51.89 DIASTOLIC DYSFUNCTION: ICD-10-CM

## 2022-08-09 DIAGNOSIS — K82.4 GALLBLADDER POLYP: ICD-10-CM

## 2022-08-09 DIAGNOSIS — E78.2 MIXED HYPERLIPIDEMIA: ICD-10-CM

## 2022-08-09 DIAGNOSIS — I48.0 PAF (PAROXYSMAL ATRIAL FIBRILLATION) (HCC): Primary | ICD-10-CM

## 2022-08-09 DIAGNOSIS — N81.2 UTEROVAGINAL PROLAPSE, INCOMPLETE: ICD-10-CM

## 2022-08-09 DIAGNOSIS — J47.9 BRONCHIECTASIS WITHOUT COMPLICATION (HCC): ICD-10-CM

## 2022-08-09 DIAGNOSIS — I10 ESSENTIAL HYPERTENSION: ICD-10-CM

## 2022-08-09 DIAGNOSIS — R73.03 PRE-DIABETES: ICD-10-CM

## 2022-08-09 DIAGNOSIS — I35.1 MILD AORTIC INSUFFICIENCY: ICD-10-CM

## 2022-08-09 PROCEDURE — 99214 OFFICE O/P EST MOD 30 MIN: CPT | Performed by: INTERNAL MEDICINE

## 2022-08-11 ENCOUNTER — TELEPHONE (OUTPATIENT)
Dept: INTERNAL MEDICINE CLINIC | Facility: CLINIC | Age: 87
End: 2022-08-11

## 2022-08-11 DIAGNOSIS — K76.9 LIVER LESION: Primary | ICD-10-CM

## 2022-08-11 DIAGNOSIS — K82.4 GALLBLADDER POLYP: ICD-10-CM

## 2022-08-11 NOTE — TELEPHONE ENCOUNTER
Pt has a question about her US. Pt had US done on 12/21 and was told to have another done in one year. Pt had another US done on 6/22. Pt wondering hen sghe should get the next [place], 12/22 or 6/23?   Please advise  Thank you

## 2022-08-11 NOTE — TELEPHONE ENCOUNTER
Please see pt message below, I was working with Virgtimbo Cremorales and the pt on this.    Please advise if you wish to have the us repeated in 1 year per 12/08/2021 lab note or 1 year from 8/9/2022 OV    Thank you

## 2022-08-11 NOTE — TELEPHONE ENCOUNTER
Complete us abdomen ordered placed cosign required expected date 6/7/2023 expires 6/7/2024  Informed pt not complete until 6/7/2023 or after that date   Pt verbalized understanding and had no additional questions

## 2022-08-29 ENCOUNTER — TELEPHONE (OUTPATIENT)
Dept: INTERNAL MEDICINE CLINIC | Facility: CLINIC | Age: 87
End: 2022-08-29

## 2022-08-29 NOTE — TELEPHONE ENCOUNTER
Pt was in on 8/9/22 and Pt forgot to ask for a recommendation for a spine/back Dr.   Please start referral with recommendation   Please notify Pt of recommendation  Thank you

## 2022-08-30 NOTE — TELEPHONE ENCOUNTER
See note below   Pt was here 8/29/22 for cards referral, discuss foot injury and questions since you're retiring   She forgot to ask you for recommendation for spine doctor  Ok to place referral for ortho?

## 2022-08-30 NOTE — TELEPHONE ENCOUNTER
She has a slipped disc in her back, arthritis in the spine  Pt has dx of cervical spondylosis osteoarthritis of right hip, primary osteoarthritis of right knee   Getting discomfort in the hip, she thinks she may need to be evaluated before she gets too old and needs emergent surgical intervention   She said dr Violet Morton did her right knee replacement in 2019 and she would want to go to him  See referral 10/31/2018 dr Violet Morton placed it

## 2022-08-30 NOTE — TELEPHONE ENCOUNTER
1) why would she need a referral to see Dr Jayshree Sykes  2) Dr Ifeoma Messina isn't a spine doctor  Spine: [facility]

## 2022-08-31 NOTE — TELEPHONE ENCOUNTER
She was requesting to see dr Misha Hopson since she wants her hip to be evaluated it has been bothering her which she thinks her hip pain is contributing to her back pain  she was told by dr Misha Hopson at past visits she would need a total hip replacement on the right side since her cartilage deteriorated on that side   She wants to see him before she actually needs immediate surgical intervention

## 2022-09-21 ENCOUNTER — TELEPHONE (OUTPATIENT)
Dept: OBGYN CLINIC | Facility: CLINIC | Age: 87
End: 2022-09-21

## 2022-09-21 ENCOUNTER — TELEPHONE (OUTPATIENT)
Dept: ORTHOPEDICS CLINIC | Facility: CLINIC | Age: 87
End: 2022-09-21

## 2022-09-21 DIAGNOSIS — Z12.31 ENCOUNTER FOR SCREENING MAMMOGRAM FOR MALIGNANT NEOPLASM OF BREAST: Primary | ICD-10-CM

## 2022-09-21 DIAGNOSIS — M79.671 BILATERAL FOOT PAIN: Primary | ICD-10-CM

## 2022-09-21 DIAGNOSIS — M79.672 BILATERAL FOOT PAIN: Primary | ICD-10-CM

## 2022-09-21 NOTE — TELEPHONE ENCOUNTER
Please enter an Xray RX for a FIDE FEET for  this patient's upcoming appointment, as the patient has not had any imaging completed. Patient was instructed to get X-rays before appt. PLEASE REPLY TO THIS MESSAGE when the order is put in EPIC so that I can schedule the Xray appt. Thank you, in advance!     Future Appointments   Date Time Provider Lennox Melania   10/26/2022  2:30 PM Lanell Osler., DPM [facility]

## 2022-09-21 NOTE — TELEPHONE ENCOUNTER
Pt has vulvitis, very uncomfortable sitting, note sure how long apply Rx cream for.  Wants order for Mammo

## 2022-09-21 NOTE — TELEPHONE ENCOUNTER
C/o vulvitis, request for mammogram order - will route request and notify pt once ordered    She stopped taking steroid cream/ premarin cream     Advised to restart  -Start mometasone ointment nightly x 2 weeks then every other night   -use premarin cream apply pea-sized amount (0.5 grams) intravaginally with finger each night   -air out irritated area, pads can cause irritation. Difficult for pt, because of stress incontinence. Pt verb understanding.

## 2022-09-22 NOTE — TELEPHONE ENCOUNTER
Spoke with patient. She would like a mammogram for piece of mind. There has been a lump on the lower left breast that has been being monitored every 3 years that has always been benign but she feels she is filling her left bra cup more than the right and states she would just feel better to have it checked. It is not painful. She is aware of Dr Suad Hahn other recommendations and has an appointment in November and see a urologist already regarding the incontinence. She will discuss that further at her November appointment. Will route order. Understanding verbalized.

## 2022-10-11 ENCOUNTER — HOSPITAL ENCOUNTER (OUTPATIENT)
Dept: MAMMOGRAPHY | Age: 87
Discharge: HOME | End: 2022-10-11
Attending: OBSTETRICS & GYNECOLOGY
Payer: MEDICARE

## 2022-10-11 DIAGNOSIS — Z12.31 ENCOUNTER FOR SCREENING MAMMOGRAM FOR MALIGNANT NEOPLASM OF BREAST: ICD-10-CM

## 2022-10-11 PROCEDURE — 77067 SCR MAMMO BI INCL CAD: CPT | Performed by: OBSTETRICS & GYNECOLOGY

## 2022-10-11 PROCEDURE — 77063 BREAST TOMOSYNTHESIS BI: CPT | Performed by: OBSTETRICS & GYNECOLOGY

## 2022-10-26 ENCOUNTER — HOSPITAL ENCOUNTER (OUTPATIENT)
Dept: GENERAL RADIOLOGY | Age: 87
Discharge: HOME | End: 2022-10-26
Attending: PODIATRIST
Payer: MEDICARE

## 2022-10-26 ENCOUNTER — OFFICE VISIT (OUTPATIENT)
Dept: ORTHOPEDICS CLINIC | Facility: CLINIC | Age: 87
End: 2022-10-26
Payer: MEDICARE

## 2022-10-26 VITALS — BODY MASS INDEX: 26.24 KG/M2 | HEIGHT: 58 IN | WEIGHT: 125 LBS

## 2022-10-26 DIAGNOSIS — M20.41 HAMMER TOE OF RIGHT FOOT: ICD-10-CM

## 2022-10-26 DIAGNOSIS — M79.672 BILATERAL FOOT PAIN: ICD-10-CM

## 2022-10-26 DIAGNOSIS — Z96.9 RETAINED ORTHOPEDIC HARDWARE: ICD-10-CM

## 2022-10-26 DIAGNOSIS — M79.671 BILATERAL FOOT PAIN: ICD-10-CM

## 2022-10-26 DIAGNOSIS — M19.079 ARTHRITIS, MIDFOOT: Primary | ICD-10-CM

## 2022-10-26 PROCEDURE — 99203 OFFICE O/P NEW LOW 30 MIN: CPT | Performed by: PODIATRIST

## 2022-10-26 PROCEDURE — 73630 X-RAY EXAM OF FOOT: CPT | Performed by: PODIATRIST

## 2022-10-26 PROCEDURE — 1125F AMNT PAIN NOTED PAIN PRSNT: CPT | Performed by: PODIATRIST

## 2022-11-07 ENCOUNTER — OFFICE VISIT (OUTPATIENT)
Facility: CLINIC | Age: 87
End: 2022-11-07
Payer: MEDICARE

## 2022-11-07 VITALS
DIASTOLIC BLOOD PRESSURE: 78 MMHG | HEART RATE: 101 BPM | HEIGHT: 58 IN | WEIGHT: 125 LBS | BODY MASS INDEX: 26.24 KG/M2 | SYSTOLIC BLOOD PRESSURE: 122 MMHG

## 2022-11-07 DIAGNOSIS — N81.2 UTEROVAGINAL PROLAPSE, INCOMPLETE: ICD-10-CM

## 2022-11-07 DIAGNOSIS — N39.46 MIXED STRESS AND URGE URINARY INCONTINENCE: ICD-10-CM

## 2022-11-07 DIAGNOSIS — N36.2 URETHRAL CARUNCLE: ICD-10-CM

## 2022-11-07 DIAGNOSIS — N81.11 CYSTOCELE, MIDLINE: ICD-10-CM

## 2022-11-07 DIAGNOSIS — N95.2 VAGINAL ATROPHY: ICD-10-CM

## 2022-11-07 DIAGNOSIS — N76.3 CHRONIC VULVITIS: Primary | ICD-10-CM

## 2022-11-07 NOTE — PATIENT INSTRUCTIONS
Vulvar care basics:    Cleaning:  -use plain warm (not hot) water (no soap) to wash if needed or can take Sitz bath (see below)   -use water, fingers, & only very gentle, fragrance-free, moisturizing cleanser       (e.g. Cetaphil or CeraVe hydrating or gentle cleansers meant for eczema/psoriasis & faces)  -only pat dry gently (no rubbing)    Sitz baths:  -soothing and cleansing  -[facility] a [facility] Bath from [facility] or [employer]--fits over toilet, you can fill with water to soak vulva without having to get in bathtub  -Use 1-3 times per day for ten minutes at a time  -Pat dry, apply thin layer of vaseline to protect the skin    Protection/Prevention:  -goal is to maintain an intact, moist (non-dehydrated) skin barrier   -need to prevent irritation & over-drying of skin that can lead to micro-tears, cracking, and itching, pain, & bleeding.   -do not scratch or wipe hard (mechanical injury)   -avoid strong chemicals/fragrances (fragrance free soap & detergents, avoid fabric softeners)  -avoid other irritants (e.g. sweat, leaked urine, leaked stool)   -avoid excessive friction (no thongs, always use lubricant for intercourse, daily barrier cream or ointment)   -breathable underwear, change underwear if sweaty/wet, no underwear while sleeping if possible. -DO USE a barrier product for protection        (e.g. Aquaphor, vaseline, A&D ointment, Zinc oxide, diaper rash cream) at least once daily  -DO NOT use any instrument (including fingers) in the anus first and then in the vagina. This will cause a bacterial infection of the vagina.      Treatment:  -BEST TREATMENT IS PREVENTION   -ointments are generally more potent than creams  -use just a thin layer  -during treatment (usually at least 2 wk) it is best to avoid intercourse to avoid trauma to the skin   -if diabetic or prone to yeast infections:          Start with internal (vaginal) AND external (vulvar) therapy         Monistat 7 vaginally AND Lotrimin (clotrimazole) twice daily at minimum 14-28 days          Both Monistat & Lotrimin are over the counter - talk to pharmacist if you need help         Often chronic therapy with clotrimazole or Nystatin (prescription) will be needed   -if diagnosed with chronic inflammatory skin condition (e.g. lichen sclerosis)          Most important is avoiding scratching & irritants         Work with gynecologist to form a steroid regimen for long term use         Often will need daily strong topical steroid (prescription) for 6-12 weeks.           Best to try to taper down to 2-3 times per week or weekly if able & can increase use in a flare  -if sensitive/dry skin         Add back some oils &/or moisture on a regular basis         Coconut oil is pure (no irritants) & contains ceramides (fatty acids) that are              important for maintaining skin barrier         Hyaluronic acid (there are suppositories for intravaginal use e.g Revaree)         Vaginal moisturizer products can be used topically as well (e.g. Replens, Ryan Pale)          Still recommend a barrier product in addition (on top of the above)

## 2022-11-16 ENCOUNTER — TELEPHONE (OUTPATIENT)
Facility: CLINIC | Age: 87
End: 2022-11-16

## 2022-11-16 NOTE — TELEPHONE ENCOUNTER
Recommend to see uro gyne for Prolapse:  Note from last visit:  -stage II uterovaginal prolapse  -as of last visit was not bothered, but today, 11/7/2022 says she is uncomfortable from bulging. On exam +Stage II-III cystocele, Stage II uterovaginal prolapse, Stage II rectocele. -urogynecology order placed. Dr. Lam Slider [facility].      Will route to MM

## 2022-11-16 NOTE — TELEPHONE ENCOUNTER
Patient called stating she was referred to a  Uro gynecologist. The soonest they can get her in is January 4th. Is it okay to wait?

## 2022-11-18 NOTE — TELEPHONE ENCOUNTER
Patient called office to ask about January 4th UroGyne visit. Patient asking if another provider can be recommended if the January date is too far out. Patient also asked if Dr. Mike Ching would speak with Urogynecologist and ask to see her sooner; informed pt this is not typically the protocol unless in unusual and emergent situations. Pt verbalized understanding.

## 2022-12-15 ENCOUNTER — HOSPITAL ENCOUNTER (OUTPATIENT)
Dept: ULTRASOUND IMAGING | Age: 87
Discharge: HOME | End: 2022-12-15
Attending: INTERNAL MEDICINE
Payer: MEDICARE

## 2022-12-15 DIAGNOSIS — K76.9 LIVER LESION: ICD-10-CM

## 2022-12-15 DIAGNOSIS — K82.4 GALLBLADDER POLYP: ICD-10-CM

## 2022-12-15 PROCEDURE — 76700 US EXAM ABDOM COMPLETE: CPT | Performed by: INTERNAL MEDICINE

## 2022-12-20 ENCOUNTER — OFFICE VISIT (OUTPATIENT)
Dept: INTERNAL MEDICINE CLINIC | Facility: CLINIC | Age: 87
End: 2022-12-20
Payer: MEDICARE

## 2022-12-20 VITALS
BODY MASS INDEX: 26.45 KG/M2 | SYSTOLIC BLOOD PRESSURE: 120 MMHG | RESPIRATION RATE: 20 BRPM | HEIGHT: 58 IN | TEMPERATURE: 98 F | WEIGHT: 126 LBS | HEART RATE: 68 BPM | DIASTOLIC BLOOD PRESSURE: 60 MMHG | OXYGEN SATURATION: 100 %

## 2022-12-20 DIAGNOSIS — K82.4 GALLBLADDER POLYP: Primary | ICD-10-CM

## 2022-12-20 PROCEDURE — 99214 OFFICE O/P EST MOD 30 MIN: CPT | Performed by: NURSE PRACTITIONER

## 2022-12-20 NOTE — PATIENT INSTRUCTIONS
Get your abdominal ultrasound done in 1 year    Follow up when your annual apt is due or sooner as needed

## 2023-01-04 ENCOUNTER — OFFICE VISIT (OUTPATIENT)
Dept: UROLOGY | Facility: CLINIC | Age: 88
End: 2023-01-04
Attending: OBSTETRICS & GYNECOLOGY
Payer: MEDICARE

## 2023-01-04 VITALS — TEMPERATURE: 97 F | BODY MASS INDEX: 26.45 KG/M2 | HEIGHT: 58 IN | WEIGHT: 126 LBS

## 2023-01-04 DIAGNOSIS — N36.2 URETHRAL CARUNCLE: ICD-10-CM

## 2023-01-04 DIAGNOSIS — N76.3 CHRONIC VULVITIS: ICD-10-CM

## 2023-01-04 DIAGNOSIS — N39.41 URGENCY INCONTINENCE: ICD-10-CM

## 2023-01-04 DIAGNOSIS — N81.4 UTEROVAGINAL PROLAPSE: Primary | ICD-10-CM

## 2023-01-04 LAB
CONTROL RUN WITHIN 24 HOURS?: YES
LEUKOCYTE ESTERASE URINE: NEGATIVE
NITRITE URINE: NEGATIVE

## 2023-01-04 PROCEDURE — 99212 OFFICE O/P EST SF 10 MIN: CPT

## 2023-01-04 PROCEDURE — 87086 URINE CULTURE/COLONY COUNT: CPT | Performed by: OBSTETRICS & GYNECOLOGY

## 2023-01-04 PROCEDURE — 81002 URINALYSIS NONAUTO W/O SCOPE: CPT | Performed by: OBSTETRICS & GYNECOLOGY

## 2023-01-05 ENCOUNTER — TELEPHONE (OUTPATIENT)
Dept: UROLOGY | Facility: CLINIC | Age: 88
End: 2023-01-05

## 2023-01-05 PROBLEM — N39.41 URGENCY INCONTINENCE: Status: ACTIVE | Noted: 2022-11-07

## 2023-01-05 PROBLEM — N81.4 UTEROVAGINAL PROLAPSE: Status: ACTIVE | Noted: 2022-02-01

## 2023-01-05 PROBLEM — N90.4 VULVAR DYSTROPHY: Status: ACTIVE | Noted: 2023-01-05

## 2023-01-05 NOTE — TELEPHONE ENCOUNTER
Pt called office looking for AVS printout with instructions from Dr Fawn Lamar from visit 1/4/23. Visit note in process and unable to relay full treatment plan of Dr Panchito Sorenson to patient. This RN was present during office visit 1/4/23 and heard Dr Fawn Lamar discuss need for Dermatology f/u for clitoral rocha irritation and need for biopsy for proper diagnosis and treatment  Pt voices understanding. Again reiterated that DR Fawn Lamar will treat her prolapse, urinary leaking and vag atrophy as Urogynecologist.  Pt would like to stop by next office day or next week for full treatment AVS printout.

## 2023-01-10 ENCOUNTER — TELEPHONE (OUTPATIENT)
Dept: UROLOGY | Facility: CLINIC | Age: 88
End: 2023-01-10

## 2023-01-10 NOTE — TELEPHONE ENCOUNTER
TC from pt inquiring if her son picked up her AVS paperwork. Printed and left for son, confirmed from [address] . Pt wanting to know why her prolapse has is gone. \"what did the Dr do while she was down there\". Explained vaginal exam and how prolapses can recede and return based on activity level and straining. Reiterated need to keep bowels regular with benefiber and miralax. Reminded pt that MD wanted her to see a dermatologsit for hypertrophic clitoris. Pt voices understanding.  No further questions

## 2023-01-13 ENCOUNTER — TELEPHONE (OUTPATIENT)
Dept: UROLOGY | Facility: CLINIC | Age: 88
End: 2023-01-13

## 2023-01-16 NOTE — TELEPHONE ENCOUNTER
[facility] Shilpi Lee [facility], [phone]. Confirmed if they received info they need for recommended follow up for vulvar/clitoral skin condition. Falguni Huff read that Dr. Shilpi Lee noted  that Dr. Dawna Bobby felt Momatesone and Premarin were appropriate and pt did not wish to proceed with prolapse/urinary leakage, no follow up necessary. Explained to RN that Dr. Dawna Bobby recommended pt follow up with DR. Shilpi Lee for vulvar/clitoral irritation that is not relieved by treatment. Progress notes copied and faxed to the derm office for review @ [phone].

## 2023-01-16 NOTE — TELEPHONE ENCOUNTER
Called pt to provide update on Dr. Major Asif office updates today. Pt reports discomfort in clitoris is intermittent, using Aquafor prn. Has scheduled follow up with derm on 2-7-23 for evaluation. Answered all questions about prolapse, she verbalizes understanding.

## 2023-01-17 ENCOUNTER — HOSPITAL ENCOUNTER (OUTPATIENT)
Dept: LAB | Facility: HOSPITAL | Age: 88
Discharge: HOME | End: 2023-01-17
Attending: INTERNAL MEDICINE
Payer: MEDICARE

## 2023-01-17 DIAGNOSIS — I48.0 PAROXYSMAL A-FIB (HCC): ICD-10-CM

## 2023-01-17 LAB — INR BLDC: 1.8 (ref 0.9–1.1)

## 2023-01-17 PROCEDURE — 85610 PROTHROMBIN TIME: CPT | Performed by: INTERNAL MEDICINE

## 2023-01-24 ENCOUNTER — HOSPITAL ENCOUNTER (OUTPATIENT)
Dept: LAB | Facility: HOSPITAL | Age: 88
Discharge: HOME | End: 2023-01-24
Attending: INTERNAL MEDICINE
Payer: MEDICARE

## 2023-01-24 DIAGNOSIS — I48.0 PAROXYSMAL A-FIB (HCC): ICD-10-CM

## 2023-01-24 LAB — INR BLDC: 2.4 (ref 0.9–1.1)

## 2023-01-24 PROCEDURE — 85610 PROTHROMBIN TIME: CPT | Performed by: INTERNAL MEDICINE

## 2023-02-07 ENCOUNTER — HOSPITAL ENCOUNTER (OUTPATIENT)
Dept: LAB | Facility: HOSPITAL | Age: 88
Discharge: HOME | End: 2023-02-07
Attending: INTERNAL MEDICINE
Payer: MEDICARE

## 2023-02-07 DIAGNOSIS — I48.0 PAROXYSMAL A-FIB (HCC): ICD-10-CM

## 2023-02-07 LAB — INR BLDC: 3.6 (ref 0.9–1.1)

## 2023-02-07 PROCEDURE — 85610 PROTHROMBIN TIME: CPT | Performed by: INTERNAL MEDICINE

## 2023-02-14 ENCOUNTER — HOSPITAL ENCOUNTER (OUTPATIENT)
Dept: LAB | Facility: HOSPITAL | Age: 88
Discharge: HOME | End: 2023-02-14
Attending: INTERNAL MEDICINE
Payer: MEDICARE

## 2023-02-14 DIAGNOSIS — I48.0 PAROXYSMAL A-FIB (HCC): ICD-10-CM

## 2023-02-14 LAB — INR BLDC: 2.7 (ref 0.9–1.1)

## 2023-02-14 PROCEDURE — 85610 PROTHROMBIN TIME: CPT | Performed by: INTERNAL MEDICINE

## 2023-03-07 ENCOUNTER — HOSPITAL ENCOUNTER (OUTPATIENT)
Dept: LAB | Facility: HOSPITAL | Age: 88
Discharge: HOME | End: 2023-03-07
Attending: INTERNAL MEDICINE
Payer: MEDICARE

## 2023-03-07 DIAGNOSIS — I48.0 PAROXYSMAL A-FIB (HCC): ICD-10-CM

## 2023-03-07 LAB — INR BLDC: 3 (ref 0.9–1.1)

## 2023-03-07 PROCEDURE — 85610 PROTHROMBIN TIME: CPT | Performed by: INTERNAL MEDICINE

## 2023-03-13 ENCOUNTER — ORDER TRANSCRIPTION (OUTPATIENT)
Dept: PHYSICAL THERAPY | Facility: HOSPITAL | Age: 88
End: 2023-03-13

## 2023-03-13 DIAGNOSIS — M16.11 PRIMARY OSTEOARTHRITIS OF RIGHT HIP: Primary | ICD-10-CM

## 2023-04-04 ENCOUNTER — HOSPITAL ENCOUNTER (OUTPATIENT)
Dept: LAB | Facility: HOSPITAL | Age: 88
Discharge: HOME | End: 2023-04-04
Attending: INTERNAL MEDICINE
Payer: MEDICARE

## 2023-04-04 DIAGNOSIS — I48.0 PAROXYSMAL A-FIB (HCC): ICD-10-CM

## 2023-04-04 LAB — INR BLDC: 3.1 (ref 0.9–1.1)

## 2023-04-04 PROCEDURE — 85610 PROTHROMBIN TIME: CPT | Performed by: INTERNAL MEDICINE

## 2023-04-11 ENCOUNTER — HOSPITAL ENCOUNTER (OUTPATIENT)
Dept: LAB | Facility: HOSPITAL | Age: 88
Discharge: HOME | End: 2023-04-11
Attending: INTERNAL MEDICINE
Payer: MEDICARE

## 2023-04-11 DIAGNOSIS — I48.0 PAROXYSMAL A-FIB (HCC): ICD-10-CM

## 2023-04-11 LAB — INR BLDC: 2.4 (ref 0.9–1.1)

## 2023-04-11 PROCEDURE — 85610 PROTHROMBIN TIME: CPT | Performed by: INTERNAL MEDICINE

## 2023-04-24 ENCOUNTER — OFFICE VISIT (OUTPATIENT)
Dept: PHYSICAL THERAPY | Facility: HOSPITAL | Age: 88
End: 2023-04-24
Attending: ORTHOPAEDIC SURGERY
Payer: MEDICARE

## 2023-04-24 DIAGNOSIS — M16.11 PRIMARY OSTEOARTHRITIS OF RIGHT HIP: ICD-10-CM

## 2023-04-24 PROCEDURE — 97162 PT EVAL MOD COMPLEX 30 MIN: CPT

## 2023-04-24 PROCEDURE — 97110 THERAPEUTIC EXERCISES: CPT

## 2023-04-26 ENCOUNTER — OFFICE VISIT (OUTPATIENT)
Dept: PHYSICAL THERAPY | Facility: HOSPITAL | Age: 88
End: 2023-04-26
Attending: ORTHOPAEDIC SURGERY
Payer: MEDICARE

## 2023-04-26 ENCOUNTER — HOSPITAL ENCOUNTER (OUTPATIENT)
Dept: LAB | Facility: HOSPITAL | Age: 88
Discharge: HOME | End: 2023-04-26
Attending: INTERNAL MEDICINE
Payer: MEDICARE

## 2023-04-26 DIAGNOSIS — I48.0 PAROXYSMAL A-FIB (HCC): ICD-10-CM

## 2023-04-26 LAB — INR BLDC: 2.1 (ref 0.9–1.1)

## 2023-04-26 PROCEDURE — 85610 PROTHROMBIN TIME: CPT | Performed by: INTERNAL MEDICINE

## 2023-04-26 PROCEDURE — 97140 MANUAL THERAPY 1/> REGIONS: CPT

## 2023-04-26 PROCEDURE — 97110 THERAPEUTIC EXERCISES: CPT

## 2023-05-01 ENCOUNTER — OFFICE VISIT (OUTPATIENT)
Dept: PHYSICAL THERAPY | Facility: HOSPITAL | Age: 88
End: 2023-05-01
Attending: ORTHOPAEDIC SURGERY
Payer: MEDICARE

## 2023-05-01 PROCEDURE — 97140 MANUAL THERAPY 1/> REGIONS: CPT

## 2023-05-01 PROCEDURE — 97110 THERAPEUTIC EXERCISES: CPT

## 2023-05-01 NOTE — PROGRESS NOTES
Diagnosis:     R hip OA   Referring Provider: Gini Abarca  Date of Evaluation:   4/24/23    Precautions:  Fall Risk, Hearing impairment Next MD visit:   none scheduled  Date of Surgery: n/a   Insurance Primary/Secondary: MEDICARE / [employer] PPO       # Auth Visits: 10   Total Timed Treatment: 45 min       Total Treatment time: 50 min       Charges: TE 2 (30) MT (15)       Treatment Number: 3/10    Subjective: Pt. Presents with RW with improved WB on R LE this date. States her R hip is still painful with walking. States she has pain that is into her L > R buttock. Pain: 6-7/10     Objective/Goals: Refer to flow sheet. Able to perform some standing exercises this date. Emphasis on seated and supine hip and core stabilization exercises to decrease pain in R hip. Antalgic gait with RW, favoring R LE. Seated flexion stretch abolishes L buttock pain. TE  nustep x 5 minutes  A/P board x 10  Quad stretch on 8 inch step x 10  Airex  *step ups x 10  *fwd lunge x 10  *toe/heel raises x 10  *mini squat x 10  Seated march x 10  Seated knee ext x 10  Seated adductor squeeze x 10  Seated toe/heel raises x 10  Supine march x 10  Supine PPT c BKFO x 10  Supine heel slides x 10  Supine pelvic rot c ball squeeze x 10  Supine PPT x 10  Bridge c SB x 10  DKTC c SB x 10  LTR c SB x 10  Seated flexion stretch x 10      MT  hooklying B distraction c mobilization belt 1 min x 3  hooklying R LAD  STM R hip/ITB  Runners stick R adductors, hip flexors, quad, ITB    Ice x 5 min R hip  Goals: (to be met in 10 visits)     1. Improve core stability by 1/2 grade to promote improved posture and body mechanics with bending and lifting. 2. Improve R hip ROM by 5 degrees in all planes to allow patient to change positions with more ease. 3. Improve SLS balance by 3 seconds B to decrease fall risk and improve safety at home. 4. Improve B LE strength by 1/2 grade to allow patient to negotiate stairs with more ease.     5. Patient to tolerated walking with LRAD for 10 minutes to perform household ambulation and to/from car with more ease. 6. Improve B LE HS flexibility by 5 degrees to decrease stress on lumbar/pelvic region. 7. Patient to be independent with HEP, joint protection principles, improved posture and body mechanics. HEP: bold exercises are part of HEP  Education: fall prevention, importance of movement, joint protection principles    Assessment: Tolerated session fairly. Limited by pain in R hip with all standing exercises. Performed all sitting and supine exercises this date due to pain and inability to bear full weight on R LE. Pt. Required constant verbal and tactile cues throughout session. Plan: Assess response to last session. Continue to progress gentle LE strengthening as tolerated. Progress to standing exercises as tolerated. Add standing SLR next session.

## 2023-05-01 NOTE — PROGRESS NOTES
Diagnosis:     R hip OA   Referring Provider: Jose Benavidez  Date of Evaluation:   4/24/23    Precautions:  Fall Risk, Hearing impairment Next MD visit:   none scheduled  Date of Surgery: n/a   Insurance Primary/Secondary: MEDICARE / [employer] PPO       # Auth Visits: 10   Total Timed Treatment: 45 min       Total Treatment time: 50 min       Charges: TE 2 (30) MT (15)       Treatment Number: 2/10    Subjective: Pt. Presents with RW with TTWB on R LE this date. States her R hip is giving her more pain and can not bear full weight through it. Pain: 6-7/10     Objective/Goals: Refer to flow sheet. Updated HEP. Emphasis on seated and supine hip and core stabilization exercises to decrease pain in R hip. TE  nustep x 5 minutes  Seated march x 10  Seated knee ext x 10  Seated adductor squeeze x 10  Seated toe/heel raises x 10  Supine bridge x 10  Supine march x 10  Supine PPT c BKFO x 10  Supine heel slides x 10  Supine pelvic rot c ball squeeze x 10  Supine PPT x 10  sidelying clams x 10  SKTC x 10  DKTC x 10  LTR x 10      MT  hooklying R LAD  STM R hip/ITB    Ice x 5 min R hip  Goals: (to be met in 10 visits)     1. Improve core stability by 1/2 grade to promote improved posture and body mechanics with bending and lifting. 2. Improve R hip ROM by 5 degrees in all planes to allow patient to change positions with more ease. 3. Improve SLS balance by 3 seconds B to decrease fall risk and improve safety at home. 4. Improve B LE strength by 1/2 grade to allow patient to negotiate stairs with more ease. 5. Patient to tolerated walking with LRAD for 10 minutes to perform household ambulation and to/from car with more ease. 6. Improve B LE HS flexibility by 5 degrees to decrease stress on lumbar/pelvic region. 7. Patient to be independent with HEP, joint protection principles, improved posture and body mechanics.     HEP: bold exercises are part of HEP  Education: fall prevention, importance of movement, joint protection principles    Assessment: Tolerated session fairly. Limited by pain in R hip with all standing exercises. Performed all sitting and supine exercises this date due to pain and inability to bear full weight on R LE. Pt. Required constant verbal and tactile cues throughout session. Plan: Assess response to last session. Continue to progress gentle LE strengthening as tolerated. Progress to standing exercises as tolerated.

## 2023-05-03 ENCOUNTER — OFFICE VISIT (OUTPATIENT)
Dept: PHYSICAL THERAPY | Facility: HOSPITAL | Age: 88
End: 2023-05-03
Attending: ORTHOPAEDIC SURGERY
Payer: MEDICARE

## 2023-05-03 PROCEDURE — 97140 MANUAL THERAPY 1/> REGIONS: CPT

## 2023-05-03 PROCEDURE — 97110 THERAPEUTIC EXERCISES: CPT

## 2023-05-04 NOTE — PROGRESS NOTES
Diagnosis:     R hip OA   Referring Provider: Ifeoma Messina  Date of Evaluation:   4/24/23    Precautions:  Fall Risk, Hearing impairment Next MD visit:   none scheduled  Date of Surgery: n/a   Insurance Primary/Secondary: MEDICARE / [address] [place], [place] [address]O       # Auth Visits: 10   Total Timed Treatment: 45 min       Total Treatment time: 50 min       Charges: TE 2 (30) MT (15)       Treatment Number: 4/10    Subjective: Still having 6-7/10 pain in her R hip. Unable to put full weight on that leg without pain. Pain in low back and going into R buttock. Pain: 6-7/10   Objective/Goals: Refer to flow sheet. Able to perform some standing exercises this date. Emphasis on seated and supine hip and core stabilization exercises to decrease pain in R hip. Antalgic gait with RW, favoring R LE. Seated flexion stretch abolishes buttock pain. TE  nustep x 5 minutes  A/P board x 10  Quad stretch on 8 inch step x 10  Airex  *step ups x 10  *fwd lunge x 10  *toe/heel raises x 10  *mini squat x 10  Standing SLR x 10 each  Seated adductor squeeze x 10  Seated toe/heel raises x 10  Supine march x 10  Supine PPT c BKFO x 10  Supine heel slides x 10  Supine pelvic rot c ball squeeze x 10  Supine PPT x 10  Bridge c SB x 10  DKTC c SB x 10  LTR c SB x 10  Seated flexion stretch x 10  Passive stretching R LE by PT    MT  hooklying B distraction c mobilization belt 1 min x 3  hooklying R LAD  STM R hip/ITB  Runners stick R adductors, hip flexors, quad, ITB    Ice x 5 min R hip  Goals: (to be met in 10 visits)     1. Improve core stability by 1/2 grade to promote improved posture and body mechanics with bending and lifting. 2. Improve R hip ROM by 5 degrees in all planes to allow patient to change positions with more ease. 3. Improve SLS balance by 3 seconds B to decrease fall risk and improve safety at home. 4. Improve B LE strength by 1/2 grade to allow patient to negotiate stairs with more ease.     5. Patient to tolerated walking with LRAD for 10 minutes to perform household ambulation and to/from car with more ease. 6. Improve B LE HS flexibility by 5 degrees to decrease stress on lumbar/pelvic region. 7. Patient to be independent with HEP, joint protection principles, improved posture and body mechanics. HEP: bold exercises are part of HEP  Education: fall prevention, importance of movement, joint protection principles    Assessment: Tolerated session fairly. Limited by pain in R hip with all standing exercises. Performed all sitting and supine exercises this date due to pain and inability to bear full weight on R LE. Pt. Required constant verbal and tactile cues throughout session. Responding to flexion biased exercises. Able to temporarily decrease hip pain with hooklying distraction. Plan: Assess response to last session. Continue to progress gentle LE strengthening as tolerated. Progress to standing exercises as tolerated. Add seated on SB next session.

## 2023-05-08 ENCOUNTER — OFFICE VISIT (OUTPATIENT)
Dept: PHYSICAL THERAPY | Facility: HOSPITAL | Age: 88
End: 2023-05-08
Attending: ORTHOPAEDIC SURGERY
Payer: MEDICARE

## 2023-05-08 PROCEDURE — 97140 MANUAL THERAPY 1/> REGIONS: CPT

## 2023-05-08 PROCEDURE — 97110 THERAPEUTIC EXERCISES: CPT

## 2023-05-08 NOTE — PROGRESS NOTES
Diagnosis:     R hip OA   Referring Provider: Chalino Case  Date of Evaluation:   4/24/23    Precautions:  Fall Risk, Hearing impairment Next MD visit:   none scheduled  Date of Surgery: n/a   Insurance Primary/Secondary: MEDICARE / [address] [place] [address] PPO       # Auth Visits: 10   Total Timed Treatment: 45 min       Total Treatment time: 50 min       Charges: TE 2 (30) MT (15)       Treatment Number: 5/10    Subjective: Still having constant 7/10 pain in her R hip. Having pain radiating into L buttock rating 5/10 that comes and goes. Unable to put full weight on that leg without pain. Feels better after PT, but then pain returns. Pain: 7/10  R hip, groin, L buttock  Objective/Goals: Refer to flow sheet. Able to perform some standing exercises this date. Emphasis on core stabilization exercises and flexion biased exercises to decrease pain in R hip and L buttock. Symptoms do resolve with hooklying distraction and flexion biased exercises. Symptoms are coming from both degenerative changes in back and hip. Antalgic gait with RW, favoring R LE. Seated flexion stretch abolishes buttock pain. TE  nustep x 5 minutes  A/P board x 10  Quad stretch on 8 inch step x 10  Airex  *step ups x 10  *fwd lunge x 10  *toe/heel raises x 10  *mini squat x 10  Standing SLR x 10 each  Seated adductor squeeze x 10  Seated toe/heel raises x 10  Seated on SB  *pelvic circles cw/ccw x 10  *march x 10  *knee ext x 10  *seated flexion stretch x 10  Bridge c SB x 10  DKTC c SB x 10  LTR c SB x 10  Seated flexion stretch x 10  Passive stretching R LE by PT    MT  hooklying B distraction c mobilization belt 1 min x 3  hooklying R LAD  STM R hip/ITB  Runners stick R adductors, hip flexors, quad, ITB    Ice x 5 min R hip  Goals: (to be met in 10 visits)     1. Improve core stability by 1/2 grade to promote improved posture and body mechanics with bending and lifting.     2. Improve R hip ROM by 5 degrees in all planes to allow patient to change positions with more ease. 3. Improve SLS balance by 3 seconds B to decrease fall risk and improve safety at home. 4. Improve B LE strength by 1/2 grade to allow patient to negotiate stairs with more ease. 5. Patient to tolerated walking with LRAD for 10 minutes to perform household ambulation and to/from car with more ease. 6. Improve B LE HS flexibility by 5 degrees to decrease stress on lumbar/pelvic region. 7. Patient to be independent with HEP, joint protection principles, improved posture and body mechanics. HEP: bold exercises are part of HEP  Education: fall prevention, importance of movement, joint protection principles    Assessment: Tolerated session fairly. Limited by pain in R hip with all standing exercises. Performed all sitting and supine exercises this date due to pain and inability to bear full weight on R LE. Pt. Required constant verbal and tactile cues throughout session. Responding to flexion biased exercises. Able to temporarily decrease hip pain with hooklying distraction. Plan: Assess response to last session. Continue to progress gentle LE strengthening as tolerated. Progress to standing exercises as tolerated. Progress with flexion biased exercises to help decrease pain in back and hip.

## 2023-05-10 ENCOUNTER — OFFICE VISIT (OUTPATIENT)
Dept: PHYSICAL THERAPY | Facility: HOSPITAL | Age: 88
End: 2023-05-10
Attending: ORTHOPAEDIC SURGERY
Payer: MEDICARE

## 2023-05-10 PROCEDURE — 97140 MANUAL THERAPY 1/> REGIONS: CPT

## 2023-05-10 PROCEDURE — 97110 THERAPEUTIC EXERCISES: CPT

## 2023-05-13 NOTE — PROGRESS NOTES
Diagnosis:     R hip OA   Referring Provider: Mehul Steele  Date of Evaluation:   4/24/23    Precautions:  Fall Risk, Hearing impairment Next MD visit:   none scheduled  Date of Surgery: n/a   Insurance Primary/Secondary: MEDICARE / Dmit[employer]y Renee PPO       # Auth Visits: 10   Total Timed Treatment: 45 min       Total Treatment time: 50 min       Charges: TE 2 (30) MT (15)       Treatment Number: 6/10    Subjective: Still having constant 6/10 pain in her R hip. Having pain radiating into L buttock rating 5/10 that comes and goes. Unable to put full weight on that leg without pain. Feels better after PT, but then pain returns. Using her walker most of the time now due the the pain not subsiding. Pain: 6/10  R hip, groin, L buttock  Objective/Goals: Refer to flow sheet. Able to perform some standing exercises this date. Added sand dune this date. Emphasis on core stabilization exercises and flexion biased exercises to decrease pain in R hip and L buttock. Symptoms do resolve with hooklying distraction and flexion biased exercises. Symptoms are coming from both degenerative changes in back and hip. Antalgic gait with RW, favoring R LE. Seated flexion stretch abolishes buttock pain. TE  nustep x 5 minutes  A/P board x 10  Quad stretch on 8 inch step x 10  Sand dune  *step ups x 10  *fwd lunge x 10  *toe/heel raises x 10  *mini squat x 10  Seated adductor squeeze x 10  Seated toe/heel raises x 10  *seated flexion stretch x 10  Bridge c SB x 10  DKTC c SB x 10  LTR c SB x 10  Seated flexion stretch x 10  Passive stretching R LE by PT    MT  hooklying B distraction c mobilization belt 1 min x 3  hooklying R LAD  STM R hip/ITB  Runners stick R adductors, hip flexors, quad, ITB    Ice x 5 min R hip  Goals: (to be met in 10 visits)     1. Improve core stability by 1/2 grade to promote improved posture and body mechanics with bending and lifting.     2. Improve R hip ROM by 5 degrees in all planes to allow patient to change positions with more ease. 3. Improve SLS balance by 3 seconds B to decrease fall risk and improve safety at home. 4. Improve B LE strength by 1/2 grade to allow patient to negotiate stairs with more ease. 5. Patient to tolerated walking with LRAD for 10 minutes to perform household ambulation and to/from car with more ease. 6. Improve B LE HS flexibility by 5 degrees to decrease stress on lumbar/pelvic region. 7. Patient to be independent with HEP, joint protection principles, improved posture and body mechanics. HEP: bold exercises are part of HEP  Education: fall prevention, importance of movement, joint protection principles    Assessment: Tolerated session fairly. Limited by pain in R hip with all standing exercises. Pt. Required constant verbal and tactile cues throughout session. Responding to flexion biased exercises. Able to temporarily decrease hip pain with hooklying distraction and flexion biased       Plan: Assess response to last session. Continue to progress gentle LE strengthening as tolerated. Progress to standing exercises as tolerated. Progress with flexion biased exercises to help decrease pain in back and hip.

## 2023-05-16 ENCOUNTER — OFFICE VISIT (OUTPATIENT)
Dept: PHYSICAL THERAPY | Facility: HOSPITAL | Age: 88
End: 2023-05-16
Attending: INTERNAL MEDICINE
Payer: MEDICARE

## 2023-05-16 ENCOUNTER — APPOINTMENT (OUTPATIENT)
Dept: PHYSICAL THERAPY | Facility: HOSPITAL | Age: 88
End: 2023-05-16
Attending: ORTHOPAEDIC SURGERY
Payer: MEDICARE

## 2023-05-16 PROCEDURE — 97110 THERAPEUTIC EXERCISES: CPT

## 2023-05-16 PROCEDURE — 97140 MANUAL THERAPY 1/> REGIONS: CPT

## 2023-05-17 NOTE — PROGRESS NOTES
Diagnosis:     R hip OA   Referring Provider: No ref. provider found  Date of Evaluation:   4/24/23    Precautions:  Fall Risk, Hearing impairment Next MD visit:   none scheduled  Date of Surgery: n/a   Insurance Primary/Secondary: MEDICARE / [address] [place] [address] PPO       # Auth Visits: 10   Total Timed Treatment: 45 min       Total Treatment time: 50 min       Charges: TE 2 (30) MT (15)       Treatment Number: 7/10    Subjective: Still having constant 5/10 pain in her R hip. Having pain radiating into L buttock rating 5/10 that comes and goes. Feels better after PT, but then pain returns. Using her walker most of the time now. Pain: 5/10  R hip, groin, L buttock  Objective/Goals: Refer to flow sheet. Able to perform some standing exercises this date. Continued with sand dune this date. Emphasis on core stabilization exercises and flexion biased exercises to decrease pain in R hip and L buttock. Symptoms do resolve with hooklying distraction and flexion biased exercises. Symptoms are coming from both degenerative changes in back and hip. Tightness throughout lumbar paraspinals. Will try MHP this date to low back and R hip to help with muscle guarding and tightness. Antalgic gait with RW, favoring R LE. Seated flexion stretch abolishes buttock pain. TE  nustep x 5 minutes  A/P board x 10  Quad stretch on 8 inch step x 10  Sand dune  *step ups x 10  *fwd lunge x 10  *toe/heel raises x 10  *mini squat x 10  Seated adductor squeeze x 10  Seated toe/heel raises x 10  *seated flexion stretch x 10  Bridge c SB x 10  DKTC c SB x 10  LTR c SB x 10  Seated flexion stretch x 10  Passive stretching R LE by PT    MT  hooklying B distraction c mobilization belt 1 min x 3  hooklying R LAD  STM R hip/ITB  Runners stick R adductors, hip flexors, quad, ITB    Heat to low back and R hip  x 5 min     Goals: (to be met in 10 visits)     1.  Improve core stability by 1/2 grade to promote improved posture and body mechanics with bending and lifting. 2. Improve R hip ROM by 5 degrees in all planes to allow patient to change positions with more ease. 3. Improve SLS balance by 3 seconds B to decrease fall risk and improve safety at home. 4. Improve B LE strength by 1/2 grade to allow patient to negotiate stairs with more ease. 5. Patient to tolerated walking with LRAD for 10 minutes to perform household ambulation and to/from car with more ease. 6. Improve B LE HS flexibility by 5 degrees to decrease stress on lumbar/pelvic region. 7. Patient to be independent with HEP, joint protection principles, improved posture and body mechanics. HEP: bold exercises are part of HEP  Education: fall prevention, importance of movement, joint protection principles    Assessment: Tolerated session fairly. Limited by pain in R hip with all standing exercises. Pt. Required constant verbal and tactile cues throughout session. Responding to flexion biased exercises. Able to temporarily decrease hip pain with hooklying distraction and flexion biased exercises. Plan: Assess response to last session. Continue to progress gentle LE strengthening as tolerated. Progress to standing exercises as tolerated. Progress with flexion biased exercises to help decrease pain in back and hip.

## 2023-05-23 ENCOUNTER — APPOINTMENT (OUTPATIENT)
Dept: PHYSICAL THERAPY | Facility: HOSPITAL | Age: 88
End: 2023-05-23
Attending: ORTHOPAEDIC SURGERY
Payer: MEDICARE

## 2023-05-24 ENCOUNTER — HOSPITAL ENCOUNTER (OUTPATIENT)
Dept: LAB | Facility: HOSPITAL | Age: 88
Discharge: HOME | End: 2023-05-24
Attending: INTERNAL MEDICINE
Payer: MEDICARE

## 2023-05-24 DIAGNOSIS — I48.0 PAROXYSMAL A-FIB (HCC): ICD-10-CM

## 2023-05-24 LAB — INR BLDC: 2.7 (ref 0.9–1.1)

## 2023-05-24 PROCEDURE — 85610 PROTHROMBIN TIME: CPT | Performed by: INTERNAL MEDICINE

## 2023-05-31 ENCOUNTER — OFFICE VISIT (OUTPATIENT)
Dept: PHYSICAL THERAPY | Facility: HOSPITAL | Age: 88
End: 2023-05-31
Attending: INTERNAL MEDICINE
Payer: MEDICARE

## 2023-05-31 PROCEDURE — 97140 MANUAL THERAPY 1/> REGIONS: CPT

## 2023-05-31 PROCEDURE — 97110 THERAPEUTIC EXERCISES: CPT

## 2023-06-01 NOTE — PROGRESS NOTES
Diagnosis:     R hip OA   Referring Provider: No ref. provider found  Date of Evaluation:   4/24/23    Precautions:  Fall Risk, Hearing impairment Next MD visit:   none scheduled  Date of Surgery: n/a   Insurance Primary/Secondary: MEDICARE / [employer] Casanova PPO       # Auth Visits: 10   Total Timed Treatment: 45 min       Total Treatment time: 50 min       Charges: TE 2 (30) MT (15)       Treatment Number: 8/10    Subjective: Pt. States R hip is feeling a little better today. States she is starting to get good days and bad days now. States she had a day over the weekend where she was able to walk inside house without any hip pain. Today she has 4/10 pain, but improved. Pain: 4/10  R hip, groin  Objective/Goals: Refer to flow sheet. Pt. Ambulating with increased WB through RLE, improved alena, improved step length and improved stance time on R LE with RW. Able to perform all standing exercises without increased pain in R hip. Continued with sand dune this date. Emphasis on core stabilization exercises and flexion biased exercises to decrease pain in back and hip. Symptoms do resolve with hooklying distraction and flexion biased exercises. Symptoms are coming from both degenerative changes in back and hip. Tightness throughout lumbar paraspinals. TE  nustep x 5 minutes  A/P board x 10  Quad stretch on 8 inch step x 10  Sand dune  *step ups x 10  *fwd lunge x 10  *lat lunge x 10  *toe/heel raises x 10  *mini squat x 10  Seated adductor squeeze x 10  Seated on SB  *cw/ccw pelvic circles x 10  *march x 10  *knee ext x 10  *seated flexion stretch x 10  *seated flexion stretch x 10  Bridge c SB x 10  DKTC c SB x 10  LTR c SB x 10  Passive stretching R LE by PT    MT  hooklying B distraction c mobilization belt 1 min x 3  hooklying R LAD  STM R hip/ITB  Runners stick R adductors, hip flexors, quad, ITB    Heat to low back and R hip  x 5 min     Goals: (to be met in 10 visits)     1.  Improve core stability by 1/2 grade to promote improved posture and body mechanics with bending and lifting. 2. Improve R hip ROM by 5 degrees in all planes to allow patient to change positions with more ease. 3. Improve SLS balance by 3 seconds B to decrease fall risk and improve safety at home. 4. Improve B LE strength by 1/2 grade to allow patient to negotiate stairs with more ease. 5. Patient to tolerated walking with LRAD for 10 minutes to perform household ambulation and to/from car with more ease. 6. Improve B LE HS flexibility by 5 degrees to decrease stress on lumbar/pelvic region. 7. Patient to be independent with HEP, joint protection principles, improved posture and body mechanics. HEP: bold exercises are part of HEP  Education: fall prevention, importance of movement, joint protection principles    Assessment: Tolerated session fairly. Today able to perform all standing exercises without increased pain in R hip. Pt. Required constant verbal and tactile cues throughout session. Responding to flexion biased exercises. Able to temporarily decrease hip pain with hooklying distraction and flexion biased exercises. Plan: Assess response to last session. Continue to progress gentle LE strengthening as tolerated. Progress to standing exercises as tolerated. Progress with flexion biased exercises to help decrease pain in back and hip.

## 2023-06-07 ENCOUNTER — APPOINTMENT (OUTPATIENT)
Dept: PHYSICAL THERAPY | Facility: HOSPITAL | Age: 88
End: 2023-06-07
Attending: INTERNAL MEDICINE
Payer: MEDICARE

## 2023-06-09 ENCOUNTER — OFFICE VISIT (OUTPATIENT)
Facility: CLINIC | Age: 88
End: 2023-06-09
Payer: MEDICARE

## 2023-06-09 VITALS
SYSTOLIC BLOOD PRESSURE: 130 MMHG | DIASTOLIC BLOOD PRESSURE: 66 MMHG | BODY MASS INDEX: 26.24 KG/M2 | WEIGHT: 125 LBS | HEART RATE: 63 BPM | HEIGHT: 58 IN

## 2023-06-09 DIAGNOSIS — N95.2 VAGINAL ATROPHY: ICD-10-CM

## 2023-06-09 DIAGNOSIS — N76.3 CHRONIC VULVITIS: Primary | ICD-10-CM

## 2023-06-09 DIAGNOSIS — N90.89 IRRITATION OF CLITORIS: ICD-10-CM

## 2023-06-09 PROCEDURE — 99214 OFFICE O/P EST MOD 30 MIN: CPT | Performed by: OBSTETRICS & GYNECOLOGY

## 2023-06-09 RX ORDER — ESTRADIOL 0.1 MG/G
CREAM VAGINAL
Qty: 42.5 G | Refills: 1 | Status: SHIPPED | OUTPATIENT
Start: 2023-06-09

## 2023-06-14 ENCOUNTER — OFFICE VISIT (OUTPATIENT)
Dept: PHYSICAL THERAPY | Facility: HOSPITAL | Age: 88
End: 2023-06-14
Attending: INTERNAL MEDICINE
Payer: MEDICARE

## 2023-06-14 PROCEDURE — 97110 THERAPEUTIC EXERCISES: CPT

## 2023-06-14 PROCEDURE — 97140 MANUAL THERAPY 1/> REGIONS: CPT

## 2023-06-14 NOTE — PROGRESS NOTES
Diagnosis:     R hip OA   Referring Provider: No ref. provider found  Date of Evaluation:   4/24/23    Precautions:  Fall Risk, Hearing impairment Next MD visit:   none scheduled  Date of Surgery: n/a   Insurance Primary/Secondary: MEDICARE / Fanta Ley PPO       # Auth Visits: 10   Total Timed Treatment: 45 min       Total Treatment time: 50 min       Charges: TE 2 (30) MT (15)       Treatment Number: 9/10    Subjective: Pt. States R hip is having a little more pain today. Rating 5/10. States she has good days and bad days. Pain: 4-5/10  R hip, groin  Objective/Goals: Refer to flow sheet. Pt. Ambulating with increased WB through RLE, improved alena, improved step length and improved stance time on R LE with RW. Able to perform all standing exercises without increased pain in R hip. Continued with sand dune this date. Emphasis on core stabilization exercises and flexion biased exercises to decrease pain in back and hip. Symptoms do resolve with hooklying distraction and flexion biased exercises. Symptoms are coming from both degenerative changes in back and hip. Tightness throughout lumbar paraspinals. TE  nustep x 5 minutes  A/P board x 10  Quad stretch on 8 inch step x 10  Sand dune  *step ups x 10  *fwd lunge x 10  *lat lunge x 10  *toe/heel raises x 10  *mini squat x 10  Seated adductor squeeze x 10  Seated on SB  *cw/ccw pelvic circles x 10  *march x 10  *knee ext x 10  *seated flexion stretch x 10  *seated flexion stretch x 10  Bridge c SB x 10  DKTC c SB x 10  LTR c SB x 10  Passive stretching R LE by PT    MT  hooklying B distraction c mobilization belt 1 min x 3  hooklying R LAD  STM R hip/ITB  Runners stick R adductors, hip flexors, quad, ITB    Heat to low back and R hip  x 5 min     Goals: (to be met in 10 visits)     1. Improve core stability by 1/2 grade to promote improved posture and body mechanics with bending and lifting.     2. Improve R hip ROM by 5 degrees in all planes to allow patient to change positions with more ease. 3. Improve SLS balance by 3 seconds B to decrease fall risk and improve safety at home. 4. Improve B LE strength by 1/2 grade to allow patient to negotiate stairs with more ease. 5. Patient to tolerated walking with LRAD for 10 minutes to perform household ambulation and to/from car with more ease. 6. Improve B LE HS flexibility by 5 degrees to decrease stress on lumbar/pelvic region. 7. Patient to be independent with HEP, joint protection principles, improved posture and body mechanics. HEP: bold exercises are part of HEP  Education: fall prevention, importance of movement, joint protection principles    Assessment: Tolerated session well. Today able to perform all standing exercises without increased pain in R hip. Pt. Required constant verbal and tactile cues throughout session. Responding to flexion biased exercises. Able to temporarily decrease hip pain with hooklying distraction and flexion biased exercises. Plan: Reassess next visit. Review/update HEP for possible discharge.

## 2023-06-27 ENCOUNTER — APPOINTMENT (OUTPATIENT)
Dept: PHYSICAL THERAPY | Facility: HOSPITAL | Age: 88
End: 2023-06-27
Attending: INTERNAL MEDICINE
Payer: MEDICARE

## 2023-06-27 ENCOUNTER — TELEPHONE (OUTPATIENT)
Dept: PHYSICAL THERAPY | Facility: HOSPITAL | Age: 88
End: 2023-06-27

## 2023-07-05 ENCOUNTER — HOSPITAL ENCOUNTER (OUTPATIENT)
Dept: LAB | Facility: HOSPITAL | Age: 88
Discharge: HOME | End: 2023-07-05
Attending: INTERNAL MEDICINE
Payer: MEDICARE

## 2023-07-05 DIAGNOSIS — I48.0 PAROXYSMAL A-FIB (HCC): ICD-10-CM

## 2023-07-05 LAB — INR BLDC: 3.6 (ref 0.9–1.1)

## 2023-07-05 PROCEDURE — 85610 PROTHROMBIN TIME: CPT | Performed by: INTERNAL MEDICINE

## 2023-07-06 ENCOUNTER — APPOINTMENT (OUTPATIENT)
Dept: PHYSICAL THERAPY | Facility: HOSPITAL | Age: 88
End: 2023-07-06
Attending: INTERNAL MEDICINE
Payer: MEDICARE

## 2023-07-12 ENCOUNTER — HOSPITAL ENCOUNTER (OUTPATIENT)
Dept: LAB | Facility: HOSPITAL | Age: 88
Discharge: HOME | End: 2023-07-12
Attending: INTERNAL MEDICINE
Payer: MEDICARE

## 2023-07-12 DIAGNOSIS — I48.0 PAROXYSMAL A-FIB (HCC): ICD-10-CM

## 2023-07-12 LAB — INR BLDC: 1.9 (ref 0.9–1.1)

## 2023-07-12 PROCEDURE — 85610 PROTHROMBIN TIME: CPT | Performed by: INTERNAL MEDICINE

## 2023-07-13 ENCOUNTER — APPOINTMENT (OUTPATIENT)
Dept: PHYSICAL THERAPY | Facility: HOSPITAL | Age: 88
End: 2023-07-13
Attending: INTERNAL MEDICINE
Payer: MEDICARE

## 2023-07-19 ENCOUNTER — OFFICE VISIT (OUTPATIENT)
Dept: PHYSICAL THERAPY | Facility: HOSPITAL | Age: 88
End: 2023-07-19
Attending: INTERNAL MEDICINE
Payer: MEDICARE

## 2023-07-19 PROCEDURE — 97110 THERAPEUTIC EXERCISES: CPT

## 2023-07-24 NOTE — PROGRESS NOTES
Dear Dr. Deon Clifford MD, Dr. Riley Graves,     This letter is to inform you of Iban Mcallister in Physical Therapy at [place] [address] and Sports Medicine. DX: R hip OA        TREATMENT #  10     Of 10        DATE OF SERVICE: 7/19/2023    ASSESSMENT  Patient reports overall 50% improvement in her R hip pain. Has good days and bad days with her hip pain. States however, new more limiting pain is now going down her LE since sitting in her rocking chair. Pt.'s new symptoms appear to be coming from her back and radiating into L LE. Pt. Will follow-up with orthopedic MD regarding new lumbar pain and radicular L LE symptoms. States 2-5/10 pain in R hip which is more tolerable. Patient reports she is independent with current HEP and will continue on her own at this time. OTHER  Pain: 2-5/10 pain in R hip depending on the day. States new pain radiating into  LLE increases to 6-8/10  Observation/Posture: fwd flexed, rounded shoulders, using RW  Neuro Screen: intact to light touch. Pt. Reports new pain radiating into L LE.    lumbar AROM: (* denotes performed with pain)  Flexion: 0cm  Extension: 4cm*  Sidebending: R 13cm; L 15cm  Rotation: R 59cm; L 61cm    R hip AROM (*pain with movement into groin)  Flexion 90 deg  Ext 5 deg  ER 15 deg  IR 25 deg    Accessory motion: hypomobility lumbar mobility, slightly improved R hip mobility  Palpation: tenderness throughout  lumbar paraspinals, tenderness R greater trochanter, along R ITB and R hip flexor.      Strength: (* denotes performed with pain)  Core strength: upper and lower abs 3+/5  LE   Hip flexion (L2): R 4-/5*; L 4/5  Hip abduction: R 4/5*; L 4/5  Hip Extension: R 4-/5*; L 4-/5   Hip ER: R 3/5*; L 4/5  Hip IR: R 3+/5*; L 4/5  Knee Flexion: R 4/5; L 4/5   Knee extension (L3): R 4/5; L 4/5   DF (L4): R 4+/5; L 4+/5  Great Toe Ext (L5): R 4/5, L 4/5  PF (S1): R 4-/5; L 4-/5     Flexibility:   LE   Hip Flexor: Rmajor, L major  Hamstrings: R -30; L -20  Piriformis: R major; L major  Quads: R mod; L mod  Gastroc-soleus: R mild; L mild     Special tests:   (+) R catherine, (+) R slump test; (+) L slump test. Repeated lumbar flexion = stretching, no radicular pain; Repeated lumbar extension = increased LBP and pain into L LE to knee    Gait: pt ambulates on level ground with RW with antalgia, decreased step length L, decreased stance phase R, decreased hip/knee flex or ext R > L and trendelenburg/waddle. Balance: SLS R 3, L unable  Time sit-stand: 15 seconds  TUG: RW 20 seconds    LONG AND SHORT TERM GOALS    Goals: (to be met in 10 visits)     1. Improve core stability by 1/2 grade to promote improved posture and body mechanics with bending and lifting. Met    2. Improve R hip ROM by 5 degrees in all planes to allow patient to change positions with more ease. Met    3. Improve SLS balance by 3 seconds B to decrease fall risk and improve safety at home. Progress    4. Improve B LE strength by 1/2 grade to allow patient to negotiate stairs with more ease. Progress    5. Patient to tolerated walking with LRAD for 10 minutes to perform household ambulation and to/from car with more ease. Met    6. Improve B LE HS flexibility by 5 degrees to decrease stress on lumbar/pelvic region. Met    7. Patient to be independent with HEP, joint protection principles, improved posture and body mechanics. Met      RECOMMENDATIONS AND PLAN OF TREATMENT  All goals met except now unable to balance on L LE due to new radicular pain down to knee. Pt. Will follow up with MD regarding new L LE radicular symptoms. REHAB POTENTIAL  Good - All functional goals met. Patient/family/caregiver was advised of these findings, precautions, and treatment options and has agreed to actively participate in planning and for this course of care. [address] YOU FOR THE REFERRAL OF Cameron Reilly.   IF YOU HAVE ANY QUESTIONS PLEASE CONTACT ME AT: [facility] [phone]          SINCERELY,           Edel Allen, PT    PHYSICIAN'S CERTIFICATION REQUIRED: no  I certify the need for these services furnished under this plan of treatment and while under my care. X_________________________________________________ Date: ____________________    CERTIFICATION FROM: 5/78/3680 to 10/17/2023    Thank you for the referral of Alen Marquez Pt. To contact MD regarding LBP with L LE radiculopathy.     Fax #Cynthia Mendez [phone]  [id number]

## 2023-07-25 ENCOUNTER — APPOINTMENT (OUTPATIENT)
Dept: PHYSICAL THERAPY | Facility: HOSPITAL | Age: 88
End: 2023-07-25
Attending: INTERNAL MEDICINE
Payer: MEDICARE

## 2023-07-27 ENCOUNTER — HOSPITAL ENCOUNTER (OUTPATIENT)
Dept: LAB | Facility: HOSPITAL | Age: 88
Discharge: HOME | End: 2023-07-27
Attending: INTERNAL MEDICINE
Payer: MEDICARE

## 2023-07-27 DIAGNOSIS — I48.0 PAROXYSMAL A-FIB (HCC): ICD-10-CM

## 2023-07-27 LAB — INR BLDC: 2.3 (ref 0.9–1.1)

## 2023-07-27 PROCEDURE — 85610 PROTHROMBIN TIME: CPT | Performed by: INTERNAL MEDICINE

## 2023-08-17 ENCOUNTER — HOSPITAL ENCOUNTER (OUTPATIENT)
Dept: LAB | Facility: HOSPITAL | Age: 88
Discharge: HOME | End: 2023-08-17
Attending: INTERNAL MEDICINE
Payer: MEDICARE

## 2023-08-17 DIAGNOSIS — I48.0 PAROXYSMAL A-FIB (HCC): ICD-10-CM

## 2023-08-17 LAB — INR BLDC: 1.9 (ref 0.9–1.1)

## 2023-08-17 PROCEDURE — 85610 PROTHROMBIN TIME: CPT | Performed by: INTERNAL MEDICINE

## 2023-08-29 ENCOUNTER — HOSPITAL ENCOUNTER (OUTPATIENT)
Dept: LAB | Facility: HOSPITAL | Age: 88
Discharge: HOME | End: 2023-08-29
Attending: INTERNAL MEDICINE
Payer: MEDICARE

## 2023-08-29 DIAGNOSIS — I48.0 PAROXYSMAL A-FIB (HCC): ICD-10-CM

## 2023-08-29 LAB — INR BLDC: 3.6 (ref 0.9–1.1)

## 2023-08-29 PROCEDURE — 85610 PROTHROMBIN TIME: CPT | Performed by: INTERNAL MEDICINE

## 2023-09-06 ENCOUNTER — HOSPITAL ENCOUNTER (OUTPATIENT)
Dept: LAB | Facility: HOSPITAL | Age: 88
Discharge: HOME | End: 2023-09-06
Attending: INTERNAL MEDICINE
Payer: MEDICARE

## 2023-09-06 DIAGNOSIS — I48.0 PAROXYSMAL A-FIB (HCC): ICD-10-CM

## 2023-09-06 LAB — INR BLDC: 4.3 (ref 0.9–1.1)

## 2023-09-06 PROCEDURE — 85610 PROTHROMBIN TIME: CPT | Performed by: INTERNAL MEDICINE

## 2023-09-13 ENCOUNTER — HOSPITAL ENCOUNTER (OUTPATIENT)
Dept: LAB | Facility: HOSPITAL | Age: 88
Discharge: HOME | End: 2023-09-13
Attending: INTERNAL MEDICINE
Payer: MEDICARE

## 2023-09-13 DIAGNOSIS — I48.0 PAROXYSMAL A-FIB (HCC): ICD-10-CM

## 2023-09-13 LAB — INR BLDC: 1.8 (ref 0.9–1.1)

## 2023-09-13 PROCEDURE — 85610 PROTHROMBIN TIME: CPT | Performed by: INTERNAL MEDICINE

## 2023-09-20 ENCOUNTER — TELEPHONE (OUTPATIENT)
Dept: INTERNAL MEDICINE CLINIC | Facility: CLINIC | Age: 88
End: 2023-09-20

## 2023-09-20 NOTE — TELEPHONE ENCOUNTER
Date of surgery not determined yet.  Dr Michelle Méndez office will call when surgery date is determined; placed pre-op form in folder

## 2023-09-21 NOTE — TELEPHONE ENCOUNTER
Date of pre-op appt:  9/26/23  Provider pre-op scheduled with: Joe Zavala  Surgeon's name:  Checo Wright MD   Phone #:  [phone]   Fax #:  [phone]  Surgery date:  10/10/23  Procedure/diagnosis:  Laser vaporization of vulva

## 2023-09-22 ENCOUNTER — HOSPITAL ENCOUNTER (OUTPATIENT)
Dept: LAB | Facility: HOSPITAL | Age: 88
Discharge: HOME | End: 2023-09-22
Attending: INTERNAL MEDICINE
Payer: MEDICARE

## 2023-09-22 DIAGNOSIS — I48.0 PAROXYSMAL A-FIB (HCC): ICD-10-CM

## 2023-09-22 LAB — INR BLDC: 3.1 (ref 0.9–1.1)

## 2023-09-22 PROCEDURE — 85610 PROTHROMBIN TIME: CPT | Performed by: INTERNAL MEDICINE

## 2023-09-26 ENCOUNTER — OFFICE VISIT (OUTPATIENT)
Dept: INTERNAL MEDICINE CLINIC | Facility: CLINIC | Age: 88
End: 2023-09-26
Payer: MEDICARE

## 2023-09-26 ENCOUNTER — LAB ENCOUNTER (OUTPATIENT)
Dept: LAB | Age: 88
End: 2023-09-26
Attending: OBSTETRICS & GYNECOLOGY
Payer: MEDICARE

## 2023-09-26 VITALS
TEMPERATURE: 98 F | BODY MASS INDEX: 26.45 KG/M2 | WEIGHT: 126 LBS | HEIGHT: 58 IN | SYSTOLIC BLOOD PRESSURE: 136 MMHG | HEART RATE: 62 BPM | OXYGEN SATURATION: 100 % | RESPIRATION RATE: 16 BRPM | DIASTOLIC BLOOD PRESSURE: 56 MMHG

## 2023-09-26 DIAGNOSIS — I48.0 PAROXYSMAL ATRIAL FIBRILLATION (HCC): ICD-10-CM

## 2023-09-26 DIAGNOSIS — R73.03 PRE-DIABETES: ICD-10-CM

## 2023-09-26 DIAGNOSIS — Z13.29 SCREENING FOR THYROID DISORDER: ICD-10-CM

## 2023-09-26 DIAGNOSIS — K82.4 GALLBLADDER POLYP: ICD-10-CM

## 2023-09-26 DIAGNOSIS — I35.1 MILD AORTIC INSUFFICIENCY: ICD-10-CM

## 2023-09-26 DIAGNOSIS — Z00.00 PHYSICAL EXAM: ICD-10-CM

## 2023-09-26 DIAGNOSIS — E78.2 MIXED HYPERLIPIDEMIA: ICD-10-CM

## 2023-09-26 DIAGNOSIS — N81.4 UTEROVAGINAL PROLAPSE: ICD-10-CM

## 2023-09-26 DIAGNOSIS — I10 ESSENTIAL HYPERTENSION: ICD-10-CM

## 2023-09-26 DIAGNOSIS — Z13.220 SCREENING FOR CHOLESTEROL LEVEL: ICD-10-CM

## 2023-09-26 DIAGNOSIS — M47.812 CERVICAL SPONDYLOSIS: ICD-10-CM

## 2023-09-26 DIAGNOSIS — N90.3 VULVAR DYSPLASIA: ICD-10-CM

## 2023-09-26 DIAGNOSIS — Z01.818 PREOP EXAM FOR INTERNAL MEDICINE: Primary | ICD-10-CM

## 2023-09-26 DIAGNOSIS — Z79.01 LONG TERM (CURRENT) USE OF ANTICOAGULANTS: ICD-10-CM

## 2023-09-26 DIAGNOSIS — J47.9 BRONCHIECTASIS WITHOUT COMPLICATION (HCC): ICD-10-CM

## 2023-09-26 DIAGNOSIS — I51.89 DIASTOLIC DYSFUNCTION: ICD-10-CM

## 2023-09-26 LAB
ALBUMIN SERPL-MCNC: 3.8 G/DL (ref 3.4–5)
ALBUMIN/GLOB SERPL: 1.1 {RATIO} (ref 1–2)
ALP LIVER SERPL-CCNC: 90 U/L
ALT SERPL-CCNC: 23 U/L
ANION GAP SERPL CALC-SCNC: 5 MMOL/L (ref 0–18)
AST SERPL-CCNC: 18 U/L (ref 15–37)
BASOPHILS # BLD AUTO: 0.02 X10(3) UL (ref 0–0.2)
BASOPHILS NFR BLD AUTO: 0.3 %
BILIRUB SERPL-MCNC: 0.6 MG/DL (ref 0.1–2)
BUN BLD-MCNC: 10 MG/DL (ref 7–18)
CALCIUM BLD-MCNC: 9.1 MG/DL (ref 8.5–10.1)
CHLORIDE SERPL-SCNC: 104 MMOL/L (ref 98–112)
CHOLEST SERPL-MCNC: 137 MG/DL (ref ?–200)
CO2 SERPL-SCNC: 26 MMOL/L (ref 21–32)
CREAT BLD-MCNC: 0.71 MG/DL
EGFRCR SERPLBLD CKD-EPI 2021: 81 ML/MIN/1.73M2 (ref 60–?)
EOSINOPHIL # BLD AUTO: 0.13 X10(3) UL (ref 0–0.7)
EOSINOPHIL NFR BLD AUTO: 1.7 %
ERYTHROCYTE [DISTWIDTH] IN BLOOD BY AUTOMATED COUNT: 13.2 %
FASTING PATIENT LIPID ANSWER: YES
FASTING STATUS PATIENT QL REPORTED: YES
GLOBULIN PLAS-MCNC: 3.6 G/DL (ref 2.8–4.4)
GLUCOSE BLD-MCNC: 92 MG/DL (ref 70–99)
HCT VFR BLD AUTO: 40 %
HDLC SERPL-MCNC: 56 MG/DL (ref 40–59)
HGB BLD-MCNC: 13 G/DL
IMM GRANULOCYTES # BLD AUTO: 0.02 X10(3) UL (ref 0–1)
IMM GRANULOCYTES NFR BLD: 0.3 %
LDLC SERPL CALC-MCNC: 58 MG/DL (ref ?–100)
LYMPHOCYTES # BLD AUTO: 1.93 X10(3) UL (ref 1–4)
LYMPHOCYTES NFR BLD AUTO: 25.7 %
MCH RBC QN AUTO: 28.8 PG (ref 26–34)
MCHC RBC AUTO-ENTMCNC: 32.5 G/DL (ref 31–37)
MCV RBC AUTO: 88.7 FL
MONOCYTES # BLD AUTO: 0.62 X10(3) UL (ref 0.1–1)
MONOCYTES NFR BLD AUTO: 8.3 %
NEUTROPHILS # BLD AUTO: 4.78 X10 (3) UL (ref 1.5–7.7)
NEUTROPHILS # BLD AUTO: 4.78 X10(3) UL (ref 1.5–7.7)
NEUTROPHILS NFR BLD AUTO: 63.7 %
NONHDLC SERPL-MCNC: 81 MG/DL (ref ?–130)
OSMOLALITY SERPL CALC.SUM OF ELEC: 279 MOSM/KG (ref 275–295)
PLATELET # BLD AUTO: 282 10(3)UL (ref 150–450)
POTASSIUM SERPL-SCNC: 4.3 MMOL/L (ref 3.5–5.1)
PROT SERPL-MCNC: 7.4 G/DL (ref 6.4–8.2)
RBC # BLD AUTO: 4.51 X10(6)UL
SODIUM SERPL-SCNC: 135 MMOL/L (ref 136–145)
TRIGL SERPL-MCNC: 131 MG/DL (ref 30–149)
TSI SER-ACNC: 1.77 MIU/ML (ref 0.36–3.74)
VLDLC SERPL CALC-MCNC: 19 MG/DL (ref 0–30)
WBC # BLD AUTO: 7.5 X10(3) UL (ref 4–11)

## 2023-09-26 PROCEDURE — 80053 COMPREHEN METABOLIC PANEL: CPT

## 2023-09-26 PROCEDURE — 36415 COLL VENOUS BLD VENIPUNCTURE: CPT

## 2023-09-26 PROCEDURE — 99215 OFFICE O/P EST HI 40 MIN: CPT | Performed by: NURSE PRACTITIONER

## 2023-09-26 PROCEDURE — 80061 LIPID PANEL: CPT

## 2023-09-26 PROCEDURE — 84443 ASSAY THYROID STIM HORMONE: CPT

## 2023-09-26 PROCEDURE — 85025 COMPLETE CBC W/AUTO DIFF WBC: CPT

## 2023-09-26 RX ORDER — TRAMADOL HYDROCHLORIDE 50 MG/1
50 TABLET ORAL EVERY 8 HOURS PRN
COMMUNITY
Start: 2023-08-02

## 2023-09-26 NOTE — PATIENT INSTRUCTIONS
Stop NSAIDs one week before surgery    Montior for signs of infection (redness, warmth, swelling, drainage, pain) after sugery    Monitor for signs of pneumonia after surgery (shortness of breath, productive cough, fever, chills)    Monitor for signs of blood clot after surgery ( leg pain, swelling, warmth, redness)    Monitor for constipation and use stool softeners/laxatives as needed. You may do prune punch daily as needed (4oz of prune juice, 4 oz of orange juice heated in the microwave and add one dose of mild of magnesia over the counter). Get your labs done. You should be fasting for at least 10 hours. If you take a multivitamin with Biotin or any biotin product it should be held for 3 days prior to getting your labs done.      Follow up in 1 month for your annual appointment or sooner as needed

## 2023-10-02 ENCOUNTER — HOSPITAL ENCOUNTER (OUTPATIENT)
Dept: LAB | Facility: HOSPITAL | Age: 88
Discharge: HOME | End: 2023-10-02
Attending: INTERNAL MEDICINE
Payer: MEDICARE

## 2023-10-02 DIAGNOSIS — I48.0 PAROXYSMAL A-FIB (HCC): ICD-10-CM

## 2023-10-02 LAB — INR BLDC: 2.7 (ref 0.9–1.1)

## 2023-10-02 PROCEDURE — 85610 PROTHROMBIN TIME: CPT | Performed by: INTERNAL MEDICINE

## 2023-10-17 ENCOUNTER — HOSPITAL ENCOUNTER (OUTPATIENT)
Dept: LAB | Facility: HOSPITAL | Age: 88
Discharge: HOME | End: 2023-10-17
Attending: INTERNAL MEDICINE
Payer: MEDICARE

## 2023-10-17 DIAGNOSIS — I48.0 PAROXYSMAL A-FIB (HCC): ICD-10-CM

## 2023-10-17 LAB — INR BLDC: 1.6 (ref 0.9–1.1)

## 2023-10-17 PROCEDURE — 85610 PROTHROMBIN TIME: CPT | Performed by: INTERNAL MEDICINE

## 2023-10-24 ENCOUNTER — HOSPITAL ENCOUNTER (OUTPATIENT)
Dept: LAB | Facility: HOSPITAL | Age: 88
Discharge: HOME | End: 2023-10-24
Attending: INTERNAL MEDICINE

## 2023-10-24 DIAGNOSIS — I48.0 PAROXYSMAL A-FIB (HCC): ICD-10-CM

## 2023-10-24 LAB — INR BLDC: 3 (ref 0.9–1.1)

## 2023-10-24 PROCEDURE — 85610 PROTHROMBIN TIME: CPT | Performed by: INTERNAL MEDICINE

## 2023-11-10 ENCOUNTER — HOSPITAL ENCOUNTER (OUTPATIENT)
Dept: LAB | Facility: HOSPITAL | Age: 88
Discharge: HOME | End: 2023-11-10
Attending: INTERNAL MEDICINE
Payer: MEDICARE

## 2023-11-10 DIAGNOSIS — I48.0 PAROXYSMAL A-FIB (HCC): ICD-10-CM

## 2023-11-10 LAB — INR BLDC: 2.4 (ref 0.9–1.1)

## 2023-11-10 PROCEDURE — 85610 PROTHROMBIN TIME: CPT | Performed by: INTERNAL MEDICINE

## 2023-11-25 ENCOUNTER — TELEPHONE (OUTPATIENT)
Dept: INTERNAL MEDICINE CLINIC | Facility: CLINIC | Age: 88
End: 2023-11-25

## 2023-11-27 NOTE — TELEPHONE ENCOUNTER
Echo ordered by pt's cardiologist Rod Mao. Care everywhere update in progress- notes available. In Nakia's bin for review, thanks!

## 2023-12-07 ENCOUNTER — TELEPHONE (OUTPATIENT)
Dept: INTERNAL MEDICINE CLINIC | Facility: CLINIC | Age: 88
End: 2023-12-07

## 2023-12-07 NOTE — TELEPHONE ENCOUNTER
Date of pre-op appt:  1/4/24  Provider pre-op scheduled with: Karen Campbell name:  Dr Lawrence Flores   Phone #:  [phone]   Fax #:  [phone]  Surgery date:  1/16/24  Procedure/diagnosis:  laser vaporization of vulva under general anesthesia    Received form from surgeon and faxed [facility] form to surgeon.   Preop placed in Nakia's preop folder

## 2023-12-12 ENCOUNTER — HOSPITAL ENCOUNTER (OUTPATIENT)
Dept: ULTRASOUND IMAGING | Age: 88
Discharge: HOME | End: 2023-12-12
Attending: NURSE PRACTITIONER
Payer: MEDICARE

## 2023-12-12 DIAGNOSIS — K82.4 GALLBLADDER POLYP: ICD-10-CM

## 2023-12-12 PROCEDURE — 76700 US EXAM ABDOM COMPLETE: CPT | Performed by: NURSE PRACTITIONER

## 2023-12-13 DIAGNOSIS — K82.4 GALLBLADDER POLYP: Primary | ICD-10-CM

## 2023-12-21 ENCOUNTER — HOSPITAL ENCOUNTER (OUTPATIENT)
Dept: LAB | Facility: HOSPITAL | Age: 88
Discharge: HOME | End: 2023-12-21
Attending: INTERNAL MEDICINE
Payer: MEDICARE

## 2023-12-21 DIAGNOSIS — I48.0 PAROXYSMAL A-FIB (HCC): ICD-10-CM

## 2023-12-21 LAB — INR BLDC: 3.1 (ref 0.9–1.1)

## 2023-12-21 PROCEDURE — 85610 PROTHROMBIN TIME: CPT | Performed by: INTERNAL MEDICINE

## 2024-01-02 ENCOUNTER — TELEPHONE (OUTPATIENT)
Dept: INTERNAL MEDICINE CLINIC | Facility: CLINIC | Age: 89
End: 2024-01-02

## 2024-01-02 NOTE — TELEPHONE ENCOUNTER
Incoming (mail or fax):  Fax  Received from:  Gynecologic Oncology  Documentation given to:  Triage

## 2024-01-02 NOTE — TELEPHONE ENCOUNTER
Pre-op requirements, placed in Nakia's pre-op bin with rest of preop documentation.    Future Appointments   Date Time Provider Department Center   1/4/2024 11:00 AM Cici Escamilla APRN [facility] [address]

## 2024-01-04 ENCOUNTER — OFFICE VISIT (OUTPATIENT)
Dept: INTERNAL MEDICINE CLINIC | Facility: CLINIC | Age: 89
End: 2024-01-04
Payer: MEDICARE

## 2024-01-04 ENCOUNTER — LAB ENCOUNTER (OUTPATIENT)
Dept: LAB | Age: 89
End: 2024-01-04
Attending: NURSE PRACTITIONER
Payer: MEDICARE

## 2024-01-04 VITALS
TEMPERATURE: 99 F | BODY MASS INDEX: 26.66 KG/M2 | HEART RATE: 72 BPM | DIASTOLIC BLOOD PRESSURE: 60 MMHG | SYSTOLIC BLOOD PRESSURE: 140 MMHG | OXYGEN SATURATION: 100 % | HEIGHT: 58 IN | RESPIRATION RATE: 14 BRPM | WEIGHT: 127 LBS

## 2024-01-04 DIAGNOSIS — I48.0 PAROXYSMAL ATRIAL FIBRILLATION (HCC): ICD-10-CM

## 2024-01-04 DIAGNOSIS — I35.1 MILD AORTIC INSUFFICIENCY: ICD-10-CM

## 2024-01-04 DIAGNOSIS — I10 ESSENTIAL HYPERTENSION: ICD-10-CM

## 2024-01-04 DIAGNOSIS — E78.2 MIXED HYPERLIPIDEMIA: ICD-10-CM

## 2024-01-04 DIAGNOSIS — K82.4 GALLBLADDER POLYP: ICD-10-CM

## 2024-01-04 DIAGNOSIS — N81.4 UTEROVAGINAL PROLAPSE: ICD-10-CM

## 2024-01-04 DIAGNOSIS — I51.89 DIASTOLIC DYSFUNCTION: ICD-10-CM

## 2024-01-04 DIAGNOSIS — Z79.01 LONG TERM (CURRENT) USE OF ANTICOAGULANTS: ICD-10-CM

## 2024-01-04 DIAGNOSIS — Z01.818 PREOP EXAM FOR INTERNAL MEDICINE: Primary | ICD-10-CM

## 2024-01-04 DIAGNOSIS — M47.812 CERVICAL SPONDYLOSIS: ICD-10-CM

## 2024-01-04 DIAGNOSIS — R73.03 PRE-DIABETES: ICD-10-CM

## 2024-01-04 DIAGNOSIS — J47.9 BRONCHIECTASIS WITHOUT COMPLICATION (HCC): ICD-10-CM

## 2024-01-04 DIAGNOSIS — N90.3 VULVAR DYSPLASIA: ICD-10-CM

## 2024-01-04 DIAGNOSIS — Z01.818 PREOP EXAM FOR INTERNAL MEDICINE: ICD-10-CM

## 2024-01-04 LAB
ANION GAP SERPL CALC-SCNC: 4 MMOL/L (ref 0–18)
BASOPHILS # BLD AUTO: 0.02 X10(3) UL (ref 0–0.2)
BASOPHILS NFR BLD AUTO: 0.4 %
BUN BLD-MCNC: 13 MG/DL (ref 9–23)
CALCIUM BLD-MCNC: 9.4 MG/DL (ref 8.5–10.1)
CHLORIDE SERPL-SCNC: 106 MMOL/L (ref 98–112)
CO2 SERPL-SCNC: 29 MMOL/L (ref 21–32)
CREAT BLD-MCNC: 0.86 MG/DL
EGFRCR SERPLBLD CKD-EPI 2021: 65 ML/MIN/1.73M2 (ref 60–?)
EOSINOPHIL # BLD AUTO: 0.13 X10(3) UL (ref 0–0.7)
EOSINOPHIL NFR BLD AUTO: 2.3 %
ERYTHROCYTE [DISTWIDTH] IN BLOOD BY AUTOMATED COUNT: 13.3 %
FASTING STATUS PATIENT QL REPORTED: YES
GLUCOSE BLD-MCNC: 96 MG/DL (ref 70–99)
HCT VFR BLD AUTO: 38.7 %
HGB BLD-MCNC: 12.2 G/DL
IMM GRANULOCYTES # BLD AUTO: 0.02 X10(3) UL (ref 0–1)
IMM GRANULOCYTES NFR BLD: 0.4 %
LYMPHOCYTES # BLD AUTO: 2.06 X10(3) UL (ref 1–4)
LYMPHOCYTES NFR BLD AUTO: 36.8 %
MCH RBC QN AUTO: 28.8 PG (ref 26–34)
MCHC RBC AUTO-ENTMCNC: 31.5 G/DL (ref 31–37)
MCV RBC AUTO: 91.3 FL
MONOCYTES # BLD AUTO: 0.61 X10(3) UL (ref 0.1–1)
MONOCYTES NFR BLD AUTO: 10.9 %
NEUTROPHILS # BLD AUTO: 2.76 X10 (3) UL (ref 1.5–7.7)
NEUTROPHILS # BLD AUTO: 2.76 X10(3) UL (ref 1.5–7.7)
NEUTROPHILS NFR BLD AUTO: 49.2 %
OSMOLALITY SERPL CALC.SUM OF ELEC: 288 MOSM/KG (ref 275–295)
PLATELET # BLD AUTO: 259 10(3)UL (ref 150–450)
POTASSIUM SERPL-SCNC: 4.2 MMOL/L (ref 3.5–5.1)
RBC # BLD AUTO: 4.24 X10(6)UL
SODIUM SERPL-SCNC: 139 MMOL/L (ref 136–145)
WBC # BLD AUTO: 5.6 X10(3) UL (ref 4–11)

## 2024-01-04 PROCEDURE — 36415 COLL VENOUS BLD VENIPUNCTURE: CPT

## 2024-01-04 PROCEDURE — 99215 OFFICE O/P EST HI 40 MIN: CPT | Performed by: NURSE PRACTITIONER

## 2024-01-04 PROCEDURE — 85025 COMPLETE CBC W/AUTO DIFF WBC: CPT

## 2024-01-04 PROCEDURE — 99499 UNLISTED E&M SERVICE: CPT | Performed by: NURSE PRACTITIONER

## 2024-01-04 PROCEDURE — 80048 BASIC METABOLIC PNL TOTAL CA: CPT

## 2024-01-04 NOTE — PATIENT INSTRUCTIONS
Get your labs done    Stop NSAIDs one week before surgery    Montior for signs of infection (redness, warmth, swelling, drainage, pain) after sugery    Monitor for signs of pneumonia after surgery (shortness of breath, productive cough, fever, chills)    Monitor for signs of blood clot after surgery ( leg pain, swelling, warmth, redness)    Monitor for constipation and use stool softeners/laxatives as needed. You may do prune punch daily as needed (4oz of prune juice, 4 oz of orange juice heated in the microwave and add one dose of milk of magnesia over the counter).      Follow up in April for your annual visit or sooner as needed

## 2024-01-05 ENCOUNTER — TELEPHONE (OUTPATIENT)
Dept: INTERNAL MEDICINE CLINIC | Facility: CLINIC | Age: 89
End: 2024-01-05

## 2024-01-05 NOTE — TELEPHONE ENCOUNTER
Patient would like to be advised on whether or not she should have the RSV vaccine. Patient states you may leave a message.    Patient phone:[phone]

## 2024-01-05 NOTE — TELEPHONE ENCOUNTER
Please see patient immunization list. Query sent and reconciled- Flu, shingrix, and covid vaccines. Pateint asking for rsv recs- okay to instruct smart phrase below? Thanks!    \"We recommend the RSV vaccine for everyone over the age of 60.  Please discuss further eligibility with the pharmacy regarding allergies etc.   We recommend spacing the RSV vaccine by a couple of weeks from other vaccines.   The reasons for this are this is a newer vaccine, and it would avoid increased adverse reactions, such as fever and local reactions.  We also would recommend spacing to be sure of an effective immunity response.  However, it is up to you if you would like to receive vaccines at the same time, it is acceptable.  \"

## 2024-01-05 NOTE — TELEPHONE ENCOUNTER
Called patient. Patient aware of below. Endorses upcoming surgery and having to stop warfarin. Spoke to provider. Advised to wait until after the procedure. Verbalizes understanding. No additional questions.

## 2024-02-01 ENCOUNTER — HOSPITAL ENCOUNTER (OUTPATIENT)
Dept: LAB | Facility: HOSPITAL | Age: 89
Discharge: HOME | End: 2024-02-01
Attending: INTERNAL MEDICINE
Payer: MEDICARE

## 2024-02-01 DIAGNOSIS — Z79.01 LONG TERM (CURRENT) USE OF ANTICOAGULANTS: ICD-10-CM

## 2024-02-01 LAB — INR BLDC: 2.9 (ref 0.9–1.1)

## 2024-02-01 PROCEDURE — 85610 PROTHROMBIN TIME: CPT

## 2024-02-15 ENCOUNTER — TELEPHONE (OUTPATIENT)
Dept: INTERNAL MEDICINE CLINIC | Facility: CLINIC | Age: 89
End: 2024-02-15

## 2024-02-15 NOTE — TELEPHONE ENCOUNTER
See message below. Reviewed last abdomen us from 12/12/23. Provider indicated \"Liver likely with a hemangioma which is benign. Also with gallbladder polyp that is slightly larger than previous. Repeat a US of the abdomen in 1 year to monitor. Nothing more to do now. Thanks\".     Nothing additional at this time? Message below mentions us liver. Thanks!

## 2024-02-15 NOTE — TELEPHONE ENCOUNTER
Patient wants to know if it is time for another US ABDOMEN OF THE ABDOMEN?     She's not sure if she should order one US that shows the ABDOMEN and the LIVER or if two different US should be ordered.    Patients phone: [phone] she says you can leave instructions for her on her vm.

## 2024-02-16 NOTE — TELEPHONE ENCOUNTER
Patient called back about this. Reiterated message below. Spoke to provider. If patient calls back about this, please notify provider regarding patients mental status. Mailed results per patient request.

## 2024-02-16 NOTE — TELEPHONE ENCOUNTER
Detailed message left. Okay per message below and josselyn. Mcm sent. Advised call back for any questions or concerns.

## 2024-02-16 NOTE — TELEPHONE ENCOUNTER
The US of the abdomen includes liver. Please let her know she just had that done and it should be repeated in December. Thanks.

## 2024-03-13 ENCOUNTER — HOSPITAL ENCOUNTER (OUTPATIENT)
Dept: LAB | Facility: HOSPITAL | Age: 89
Discharge: HOME | End: 2024-03-13
Attending: INTERNAL MEDICINE
Payer: MEDICARE

## 2024-03-13 DIAGNOSIS — Z79.01 LONG TERM (CURRENT) USE OF ANTICOAGULANTS: ICD-10-CM

## 2024-03-13 LAB — INR BLDC: 2.2 (ref 0.9–1.1)

## 2024-03-13 PROCEDURE — 85610 PROTHROMBIN TIME: CPT

## 2024-03-15 ENCOUNTER — TELEPHONE (OUTPATIENT)
Facility: CLINIC | Age: 89
End: 2024-03-15

## 2024-03-15 ENCOUNTER — OFFICE VISIT (OUTPATIENT)
Facility: CLINIC | Age: 89
End: 2024-03-15
Payer: MEDICARE

## 2024-03-15 VITALS
HEART RATE: 62 BPM | SYSTOLIC BLOOD PRESSURE: 118 MMHG | BODY MASS INDEX: 26.91 KG/M2 | DIASTOLIC BLOOD PRESSURE: 68 MMHG | WEIGHT: 128.19 LBS | HEIGHT: 58 IN

## 2024-03-15 DIAGNOSIS — N95.2 VAGINAL ATROPHY: ICD-10-CM

## 2024-03-15 DIAGNOSIS — N76.3 CHRONIC VULVITIS: ICD-10-CM

## 2024-03-15 DIAGNOSIS — D07.1 VIN III (VULVAR INTRAEPITHELIAL NEOPLASIA III): Primary | ICD-10-CM

## 2024-03-15 DIAGNOSIS — N36.2 URETHRAL CARUNCLE: ICD-10-CM

## 2024-03-15 DIAGNOSIS — N81.4 UTEROVAGINAL PROLAPSE: ICD-10-CM

## 2024-03-15 DIAGNOSIS — N39.41 URGENCY INCONTINENCE: ICD-10-CM

## 2024-03-15 DIAGNOSIS — N81.11 CYSTOCELE, MIDLINE: ICD-10-CM

## 2024-03-15 PROCEDURE — 99213 OFFICE O/P EST LOW 20 MIN: CPT | Performed by: OBSTETRICS & GYNECOLOGY

## 2024-03-15 NOTE — H&P
[facility]  Obstetrics and Gynecology   Progress  Established     Chief complaint:   Chief Complaint   Patient presents with    Follow - Up     Vulvar check      Subjective:     HPI: Mandie Amin is a 89 year old  postmenopausal female - follow up vulvar issues. 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 Has not been treated.     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 Also epidermal inclusion cysts of the vulva (non-inflamed), urethral caruncle, vaginal atrophy, mixed urinary incontinence, symptomatic stage II anterior & uterovaginal prolapse, and localized vulvitis of the clitoral body/glans that has been unrelieved with any of the treatments tried per GYN & DERM (Nystatin, Mometasone, estrogen cream, Clobetasol, Vaseline barrier). Patient declines treatment for her urinary incontinence, which may be driving this whole process.     Extensive review of various notes, etc - see my note from 23 OV - Patient c/o saw urogyn & derm & neither was helpful, back to GYN.   Wears daily pad for urinary incontinence  Vaginal estrogen - not using   Nystatin - not using   Clobetasol - not using    Using vaseline only on vulva    No better. Still irritated.   Newly since she saw the dermatologist in 2023 she has a NEW area of irritation left superior labia minora.   No bleeding   As 2023 exam (compared with my 22 exam) the inferior aspect of the clitoris into the left medial labia minora near the urethra looks newly raw/eroded and irregularly textured in appearance. Cannot exclude a malignant process. Recommend GYN ONC for evaluation & consideration of clitoral/superior labia minora biopsy. Patient is on Warfarin so I don't feel comfortable doing this biopsy in clinic.     23 Gyn Onc Dr. Rishabh Fonseca - initial consult. He recommended biopsy of lesion of left clitoral rocha. Patient declined.   23 Gyn Onc Dr. Keating  Raymundo - biopsy left clitoral rocha  9/13/23 Gyn Onc Dr. Rishabh Fonseca - per note: vulvar biopsy showed \"moderate to severe dysplasia.\" Recommend laser vaporization of the vulva. Path report: VIN2-3 extending to biopsy margins.  10/9/23 - was scheduled for surgery - patient cancelled    11/8/23 Gyn Onc Dr. Rishabh Fonseca - patient had cancelled the surgery due to getting nervous about her age and medical conditions. Note indicates long discussion. Pt still not decided on surgery. She was told to undergo medical clearance & return for exam 1 week or so before she schedules her surgery to have re-evaluation by Dr. Fonseca to make sure nothing has changed \"as the biopsy showed severe dysplasia.\"    As of today, 3/15/2024   Says she is here to have me look at her vulva   Says she told Dr. Fonseca she is waiting to hear if her son has pancreatic cancer before she would decide if she could go through with surgery. It has been 2 wk since his biopsy.     Says clitoris is , sensitive. Sometimes it feels like it is larger & sometimes smaller.   Some external burning of vulva with urination  Urine incontinence has gotten worse  Getting up at night every 1 hour to void   Has a cane to walk - slow to get to the bathroom.   No bleeding  No new painful areas of the vulva.     Says she felt rushed at Dr. Gregory's office.     PCP: Roro Middleton MD    Review of Systems   Gastrointestinal:  Positive for diarrhea. Negative for blood in stool and constipation.        Certain foods - like chocolate or ice cream will cause diarrhea. BM mostly daily. No leaking stool. No blood in stool. No pain with BM. BM usually normal caliber. Some can be thinner in size.    Genitourinary:  Positive for frequency. Negative for difficulty urinating, dysuria, hematuria, pelvic pain, vaginal bleeding and vaginal discharge.        Clitoris still sensitive.   No new areas of irritation of the vulva.     A little burn initially when starting to  urinate if hasn't been drinking enough water.     Urinary frequency - always. If waits too long to go may have a little urinary leaking. If full bladder could leak with a laugh, cough, sneeze. No gross blood.     Wears pad daily. Will change if leaks.       No vaginal bleeding or discharge      Psychiatric/Behavioral:          Waiting to hear if her son's biopsy shows pancreatic cancer. She is waiting on this before she would decide whether or not to proceed with a procedure on herself.       GYN Hx  Menopause 57 y/o   Not sexually active.  Pap unsure. No history of LEEP/conization.     Mammogram 10/11/22 benign  Colonoscopy   DEXA scan - none on file. Defer to PCP     OB History:  OB History    Para Term  AB Living   6 5 4 0 1 4   SAB IAB Ectopic Multiple Live Births   1 0 0 0 4   Obstetric Comments   1 baby stillborn     OB History    Para Term  AB Living   6 5 4   1 4   SAB IAB Ectopic Multiple Live Births   1       4      # Outcome Date GA Lbr Tyree/2nd Weight Sex Delivery Anes PTL Lv   6 Term 05/15/70 40w0d   M NORMAL SPONT      5 Term 65 40w0d   M NORMAL SPONT      4 Para 59    F NORMAL SPONT   FD      Birth Comments: stillborn   3 Term 57 40w0d   M NORMAL SPONT      2 Term 56 40w0d   M NORMAL SPONT      1 SAB               Obstetric Comments   1 baby stillborn       Meds:  Current Outpatient Medications on File Prior to Visit   Medication Sig Dispense Refill    warfarin 5 MG Oral Tab TAKE ONE-HALF TO ONE TABLET DAILY AS DIRECTED BY ANTICOAGULATION CLINIC 80 tablet 3    Losartan Potassium 25 MG Oral Tab Take 1 tablet (25 mg total) by mouth daily. 90 tablet 3    atorvastatin 10 MG Oral Tab Take 1 tablet (10 mg total) by mouth nightly. 90 tablet 3    digoxin (DIGOX) 0.125 MG Oral Tab TAKE 1 TABLET(125 MCG) BY MOUTH EVERY DAY 90 tablet 3    METOPROLOL TARTRATE 50 MG Oral Tab TAKE ONE TABLET BY MOUTH TWICE DAILY. 180 tablet 3    prednisoLONE acetate 1 %  Ophthalmic Suspension Place 1 drop into both eyes 2 (two) times daily.  1    Vitamin D3 2000 units Oral Cap Take 1 capsule (2,000 Units total) by mouth daily.      ketorolac tromethamine 0.5 % Ophthalmic Solution Place 1 drop into both eyes 2 (two) times daily. 6AM AND 6PM.      Dorzolamide HCl-Timolol Mal 22.3-6.8 MG/ML Ophthalmic Solution Place 1 drop into the right eye 2 (two) times daily.  2    traMADol 50 MG Oral Tab Take 1 tablet (50 mg total) by mouth every 8 (eight) hours as needed. (Patient not taking: Reported on 3/15/2024)      estradiol 0.1 MG/GM Vaginal Cream Place 0.5 grams (pea-sized amount) per vagina nightly. (Patient not taking: Reported on 3/15/2024) 42.5 g 1    Meclizine HCl 12.5 MG Oral Tab Take 1 tablet (12.5 mg total) by mouth 3 (three) times daily as needed. (Patient not taking: Reported on 3/15/2024)       No current facility-administered medications on file prior to visit.       All:  Allergies   Allergen Reactions    Amoxicillin SWELLING     Swollen tongue       PMH:  Past Medical History:   Diagnosis Date    Acute sinusitis 11/18/2008    Arrhythmia     A-fib attacks 2003 and 2004    Asymptomatic microscopic hematuria 2019    Since at least 2019 - seeing urologist 2022    Atrial fibrillation (HCC)     Benign paroxysmal vertigo 06/28/2010    Breast lump 08/2007    lower right breast    Bronchiectasis (HCC) 06/23/2015    Cervical spondylosis 08/29/2011    Cervical strain 08/29/2011    Cervicalgia 08/29/2011    Chronic cough     Diastolic dysfunction 08/2011    Early cataract     Exudative age-related macular degeneration, left eye, with active choroidal neovascularization (HCC)     Hand fracture 02/08/2011    third metacarpal phalangeal joint    Hard of hearing     Hearing impairment     NO HEARING AIDS    Heart disease     Heart murmur     on exam 2/1/22. Patient states she has a leaky valve    Herpes zoster 01/2021    High blood pressure     High cholesterol     History of bone density  study 10/2006    Hx of motion sickness     IGT (impaired glucose tolerance) 03/25/2014    Infection 02/20/2011    right hand    Insomnia     Lipid screening 04/05/2012    Liver lesion 05/04/2021    Probably focal steatosis Unchanged 7 mos later, c/w hemangioma    Microscopic hematuria 01/2022    Unremarkable cystoscopy 1/25/22 Dr. Fields. Renal US normal. CT urogram neg. 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 Left clitoral rocha. Dr. Rishabh Fonseca    Visual impairment     GLASSES    Vulvar cysts 02/01/2022       PSH:  Past Surgical History:   Procedure Laterality Date    APPENDECTOMY  18 y/o    COLONOSCOPY  2007    diverticulosis    COLONOSCOPY  05/30/2013    Procedure: COLONOSCOPY;  Surgeon: Júnior Lawrence MD;  Location:  ENDOSCOPY    CORRECT BUNION,SIMPLE      CYSTOURETHROSCOPY  01/25/2022    Dr. Fields [facility] - unremarkable cystoscopy in office. H/o asymptomatic microscopic hematuria with h/o normal renal US. CT urogram ordered.    FOOT/TOES SURGERY PROC UNLISTED  1990's    left    TONSILLECTOMY      TOTAL KNEE REPLACEMENT  2002/03    left    VULVAR BIOPSY - JAR(S): 6 Left 09/13/2023    VIN2-3 extending to biopsy margins. Left clitoral rocha. Dr. Rishabh Fonseca       Social History:  Social History     Socioeconomic History    Marital status:      Spouse name: Not on file    Number of children: Not on file    Years of education: Not on file    Highest education level: Not on file   Occupational History    Not on  file   Tobacco Use    Smoking status: Never    Smokeless tobacco: Never   Vaping Use    Vaping Use: Never used   Substance and Sexual Activity    Alcohol use: Yes     Alcohol/week: 0.0 - 0.8 standard drinks of alcohol     Comment: occ    Drug use: No    Sexual activity: Not Currently   Other Topics Concern     Service Not Asked    Blood Transfusions Not Asked    Caffeine Concern Yes    Occupational Exposure Not Asked    Hobby Hazards Not Asked    Sleep Concern Not Asked    Stress Concern Not Asked    Weight Concern Not Asked    Special Diet Not Asked    Back Care Not Asked    Exercise Yes     Comment: some    Bike Helmet Not Asked    Seat Belt Not Asked    Self-Exams Not Asked   Social History Narrative    Not on file     Social Determinants of Health     Financial Resource Strain: Not on file   Food Insecurity: Not on file   Transportation Needs: Not on file   Physical Activity: Not on file   Stress: Not on file   Social Connections: Not on file   Housing Stability: Not on file        Family History:  Family History   Problem Relation Age of Onset    Other (bladder cancer) Father 80    Colon Cancer Mother 87    Breast Cancer Other         niece and herman    Other (DM) Other     Heart Disease Brother     Other (Tobacco use) Sister     Other (Atrial fibrillation) Sister     Other (MI,) Brother 52       Objective:     Vitals:    03/15/24 1237   BP: 118/68   Pulse: 62   Weight: 128 lb 3.2 oz (58.2 kg)   Height: 58\"         Body mass index is 26.79 kg/m².    Physical Exam:  Physical Exam   Nursing note and vitals reviewed.   Constitutional: She appears well-developed and well-nourished.   Hard of hearing. Have to raise voice for her to hear me.    HENT:   Head: Normocephalic and atraumatic.   +Glasses. Slightly hard of hearing.    Eyes: Conjunctivae and EOM are normal.   Genitourinary:          Genitourinary Comments: VULVA:   Generalized atrophy & pallor. Bilateral labia majora with multiple subcutaneous  non-tender nodules about 5 mm or size or less that are either flesh colored or slightly yellow. No drainage, erythema, crusting. There are a few dark dilated looking pores on each side.     Clitoral body - the skin itself looks slightly pale, slightly edematous/mascerated, but is soft. Inferior aspect of clitoral body with berenice colored slightly denuded/eroded looking areas about 3 mm in size x 2.     Inferior aspect of clitoris into the left medial superior labia minora near the urethra appears raw, eroded, and irregularly textured. Not actively bleeding. This tissue is firmer and NOT tender to palpation.     URETHRA: +dark pink/red small urethral caruncle  PERINEUM: intact, no lesions. +External hemorrhoids   VAGINA: stage II-III cystocele, stage II uterovaginal prolapse, stage I-II rectocele.   CERVIX: deferred  UTERUS: deferred  ADNEXA: deferred  PELVIC FLOOR: deferred     Musculoskeletal:      Comments: +Kyphosis    Neurological: She is alert.   Psychiatric: She has a normal mood and affect. Her behavior is normal. Judgment and thought content normal.       Labs:  Lab Results   Component Value Date    WBC 5.6 01/04/2024    RBC 4.24 01/04/2024    HGB 12.2 01/04/2024    HCT 38.7 01/04/2024    MCV 91.3 01/04/2024    MCH 28.8 01/04/2024    MCHC 31.5 01/04/2024    RDW 13.3 01/04/2024    .0 01/04/2024        Lab Results   Component Value Date    GLU 96 01/04/2024    BUN 13 01/04/2024    BUNCREA NOT APPLICABLE 07/06/2022    CREATSERUM 0.86 01/04/2024    ANIONGAP 4 01/04/2024    GFR 72 12/08/2016    GFRNAA 69 07/06/2022    GFRAA 80 07/06/2022    CA 9.4 01/04/2024    OSMOCALC 288 01/04/2024    ALKPHO 90 09/26/2023    AST 18 09/26/2023    ALT 23 09/26/2023    BILT 0.6 09/26/2023    TP 7.4 09/26/2023    ALB 3.8 09/26/2023    GLOBULIN 3.6 09/26/2023    AGRATIO 1.8 07/06/2022     01/04/2024    K 4.2 01/04/2024     01/04/2024    CO2 29.0 01/04/2024       Lab Results   Component Value Date    CHOLEST 137  2023    TRIG 131 2023    HDL 56 2023    LDL 58 2023    VLDL 19 2023    TCHDLRATIO 2.6 2022    NONHDLC 81 2023        Lab Results   Component Value Date    TSH 1.770 2023        Lab Results   Component Value Date     2021    A1C 5.9 (H) 2022       Imaging:  No results found.   Assessment:     Mandie Amin is a 89 year old  female postmenopausal female with mixed urinary incontinence, symptomatic stage II anterior & uterovaginal prolapse, urethral caruncle, vaginal atrophy, epidermal inclusion cysts of the vulva diagnosed with VIN2-3 on clitoral rocha biopsy by GYN ONC Dr. Fonseca  23. Has not been treated due to patient cancelling surgery & now holding off on deciding to have surgery or not due to waiting to see if her son has pancreatic cancer.     Has non tender tissue firmness and apparent erosion of the skin of the inferior aspect of the clitoris and into the left medial labial minora. Concerning for possible invasive process.     Diagnoses and all orders for this visit:    CHULA III (vulvar intraepithelial neoplasia III)    Chronic vulvitis    Cystocele, midline    Urethral caruncle    Urgency incontinence    Uterovaginal prolapse    Vaginal atrophy           Plan:     VIN2-3 on biopsy 2023 per Dr. Fonseca  -Has not had treatment   -She cancelled a scheduled surgery in 10/2023  -per patient she cancelled because she was afraid of having surgery at her age & with her co-morbidities  -she reports it has been 2 wk since her son had a biopsy that may show he has pancreatic cancer  -she reports she would need to see if he needs to be treated first given her biopsy was only precancerous  -3/15/24 exam today with more extensive looking area of erosion and the superior left labia minora tissue is somewhat firm but non-tender. Concerning - may be turning into malignant process. Strongly advise her to return to GYN Onc.   -she says she is going  to call Dr. Fonseca's office. Other GYN ONC names & contact information given in case she decides she needs another opinion.     Urethral caruncle  -still present, had hard time locating urethra, suspect not much estrogen cream made it to the urethra  -can use premarin cream apply pea-sized amount (0.5 grams) intravaginally with finger each night so that she does not have to try too hard to get to urethra   -this may actually be the cause of her microscopic hematuria    -3/15/2024 not using vaginal estrogen     Prolapse  -stage II uterovaginal prolapse  -11/7/2022 - symptomatic now, uncomfortable from bulging. On exam +Stage II-III cystocele, Stage II uterovaginal prolapse, Stage II rectocele.   -Declined urogynecology treatments (pessary, surgery) offered.    To GYN ONC. RTC PRN    Kristen Eckert MD  [facility] - OBGYN

## 2024-03-15 NOTE — TELEPHONE ENCOUNTER
Contacted Dr. Fonseca's office to request notes from most recent appt to be faxed. For pt's appt 3/15/24 12:30 PM with Dr. Eckert.

## 2024-03-15 NOTE — PATIENT INSTRUCTIONS
Dr. Rishabh Fonseca  Gynecologic Oncology    [address], [address]  [place] [address]    P [phone]  F [phone]    [email or web address]      Dr. Derick Saeed   Gynecologic oncology  [phone]  [facility]      Dr. Omer Fonseca  [address] [address] [phone]  (Gynecologic Oncology)

## 2024-03-19 ENCOUNTER — TELEPHONE (OUTPATIENT)
Facility: CLINIC | Age: 89
End: 2024-03-19

## 2024-03-19 NOTE — TELEPHONE ENCOUNTER
Spoke with pt. She was prescribed estradiol vaginal cream. She has been using it on & off. She is wanting to know if thi is something she should continue. If so how long should she continuing using. Aware I will route message to Dr. Francis and call with her recommendations. Verbalized understanding.

## 2024-03-19 NOTE — TELEPHONE ENCOUNTER
Pt rqto speak with nurse if Dr. Eckert wants her to continue with   Medication Quantity Refills Start End   estradiol 0.1 MG/GM Vaginal Cream

## 2024-03-20 NOTE — TELEPHONE ENCOUNTER
Spoke with pt. Aware Dr. Eckert recommends she continue the vaginal estrogen indefinitely. Verbalized understanding.

## 2024-05-07 ENCOUNTER — HOSPITAL ENCOUNTER (OUTPATIENT)
Dept: LAB | Facility: HOSPITAL | Age: 89
Discharge: HOME | End: 2024-05-07
Attending: INTERNAL MEDICINE
Payer: MEDICARE

## 2024-05-07 DIAGNOSIS — Z79.01 LONG TERM (CURRENT) USE OF ANTICOAGULANTS: ICD-10-CM

## 2024-05-07 LAB — INR BLDC: 2.2 (ref 0.9–1.1)

## 2024-05-07 PROCEDURE — 85610 PROTHROMBIN TIME: CPT

## 2024-06-06 ENCOUNTER — OFFICE VISIT (OUTPATIENT)
Dept: INTERNAL MEDICINE CLINIC | Facility: CLINIC | Age: 89
End: 2024-06-06
Payer: MEDICARE

## 2024-06-06 VITALS
OXYGEN SATURATION: 100 % | BODY MASS INDEX: 26.49 KG/M2 | TEMPERATURE: 98 F | HEIGHT: 58 IN | RESPIRATION RATE: 22 BRPM | HEART RATE: 66 BPM | WEIGHT: 126.19 LBS | DIASTOLIC BLOOD PRESSURE: 60 MMHG | SYSTOLIC BLOOD PRESSURE: 130 MMHG

## 2024-06-06 DIAGNOSIS — R22.32 MASS OF LEFT AXILLA: ICD-10-CM

## 2024-06-06 DIAGNOSIS — Z00.00 PHYSICAL EXAM: ICD-10-CM

## 2024-06-06 DIAGNOSIS — E55.9 VITAMIN D DEFICIENCY: ICD-10-CM

## 2024-06-06 DIAGNOSIS — Z13.29 SCREENING FOR THYROID DISORDER: ICD-10-CM

## 2024-06-06 DIAGNOSIS — B36.9 FUNGAL DERMATITIS: ICD-10-CM

## 2024-06-06 DIAGNOSIS — K82.4 GALLBLADDER POLYP: Primary | ICD-10-CM

## 2024-06-06 DIAGNOSIS — Z13.220 SCREENING FOR CHOLESTEROL LEVEL: ICD-10-CM

## 2024-06-06 PROCEDURE — 99214 OFFICE O/P EST MOD 30 MIN: CPT | Performed by: NURSE PRACTITIONER

## 2024-06-06 RX ORDER — CLOTRIMAZOLE 1 %
1 CREAM (GRAM) TOPICAL 2 TIMES DAILY PRN
Qty: 1 EACH | Refills: 0 | Status: SHIPPED | OUTPATIENT
Start: 2024-06-06

## 2024-06-06 NOTE — PATIENT INSTRUCTIONS
Get the arm pit ultrasound     Get the abdominal ultrasound in December after the 12th     Use the medicated cream and monitor    Continue to see dermatology     Get your labs done in September after 9/26. You should be fasting for at least 10 hours. If you take a multivitamin with Biotin or any biotin product it should be held for 3 days prior to getting your labs done.     See Dr. Fonseca     Follow up in September after your labs or sooner as needed

## 2024-06-06 NOTE — PROGRESS NOTES
Mandie Amin is a 89 year old female.  CHIEF COMPLAINT   Multiple issues     HPI:   Patient has a history of gallbladder polyp.  She needs a repeat ultrasound done in December.  She would like the order placed.  No abdominal pain at this time.    She has a lump to the left armpit area that has been there for a while but seems like it got bigger.  No pain or any other symptoms to it.    Has a burning feeling with a rash around her abdomen area and would like it evaluated.        Current Outpatient Medications   Medication Sig Dispense Refill    clotrimazole 1 % External Cream Apply 1 each topically 2 (two) times daily as needed. 1 each 0    traMADol 50 MG Oral Tab Take 1 tablet (50 mg total) by mouth every 8 (eight) hours as needed.      estradiol 0.1 MG/GM Vaginal Cream Place 0.5 grams (pea-sized amount) per vagina nightly. 42.5 g 1    warfarin 5 MG Oral Tab TAKE ONE-HALF TO ONE TABLET DAILY AS DIRECTED BY ANTICOAGULATION CLINIC 80 tablet 3    Losartan Potassium 25 MG Oral Tab Take 1 tablet (25 mg total) by mouth daily. 90 tablet 3    atorvastatin 10 MG Oral Tab Take 1 tablet (10 mg total) by mouth nightly. 90 tablet 3    digoxin (DIGOX) 0.125 MG Oral Tab TAKE 1 TABLET(125 MCG) BY MOUTH EVERY DAY 90 tablet 3    METOPROLOL TARTRATE 50 MG Oral Tab TAKE ONE TABLET BY MOUTH TWICE DAILY. 180 tablet 3    prednisoLONE acetate 1 % Ophthalmic Suspension Place 1 drop into both eyes 2 (two) times daily.  1    Vitamin D3 2000 units Oral Cap Take 1 capsule (2,000 Units total) by mouth daily.      ketorolac tromethamine 0.5 % Ophthalmic Solution Place 1 drop into both eyes 2 (two) times daily. 6AM AND 6PM.      Meclizine HCl 12.5 MG Oral Tab Take 1 tablet (12.5 mg total) by mouth 3 (three) times daily as needed.      Dorzolamide HCl-Timolol Mal 22.3-6.8 MG/ML Ophthalmic Solution Place 1 drop into the right eye 2 (two) times daily.  2      Past Medical History:    Acute sinusitis    Arrhythmia    A-fib attacks 2003 and 2004     Asymptomatic microscopic hematuria    Since at least 2019 - seeing urologist 2022    Atrial fibrillation (HCC)    Benign paroxysmal vertigo    Breast lump    lower right breast    Bronchiectasis (HCC)    Cervical spondylosis    Cervical strain    Cervicalgia    Chronic cough    Diastolic dysfunction    Early cataract    Exudative age-related macular degeneration, left eye, with active choroidal neovascularization (HCC)    Hand fracture    third metacarpal phalangeal joint    Hard of hearing    Hearing impairment    NO HEARING AIDS    Heart disease    Heart murmur    on exam 2/1/22. Patient states she has a leaky valve    Herpes zoster    High blood pressure    High cholesterol    History of bone density study    Hx of motion sickness    IGT (impaired glucose tolerance)    Infection    right hand    Insomnia    Lipid screening    Liver lesion    Probably focal steatosis Unchanged 7 mos later, c/w hemangioma    Microscopic hematuria    Unremarkable cystoscopy 1/25/22 Dr. Fields. Renal US normal. CT urogram neg. 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 Left clitoral rocha. Dr. Rishabh Fonseca    Visual impairment    GLASSES    Vulvar cysts      Social History:  Social History     Socioeconomic History    Marital status:    Tobacco Use    Smoking status: Never    Smokeless tobacco: Never   Vaping Use    Vaping status: Never Used   Substance and Sexual Activity    Alcohol use: Yes     Alcohol/week: 0.0 - 0.8 standard drinks of alcohol     Comment: occ    Drug use: No    Sexual  activity: Not Currently   Other Topics Concern    Caffeine Concern Yes    Exercise Yes     Comment: some        REVIEW OF SYSTEMS:   See HPI     EXAM:     /60 (BP Location: Right arm, Patient Position: Sitting, Cuff Size: adult)   Pulse 66   Temp 97.8 °F (36.6 °C) (Temporal)   Resp 22   Ht 4' 10\" (1.473 m)   Wt 126 lb 3.2 oz (57.2 kg)   LMP  (LMP Unknown)   SpO2 100%   BMI 26.38 kg/m²   Body mass index is 26.38 kg/m².   GENERAL: well developed, well nourished,in no apparent distress  SKIN: Skin lesions to back, cyst like area to axilla that is nickel size and not tender. Patch of redness across abdomen with no raised lesions.  HEENT: atraumatic, normocephalic  LUNGS: clear to auscultation; no rhonchi, rales, or wheezing  CARDIO: RRR with murmur  GI: no tenderness  MUSCULOSKELETAL:  abnormal gait.  Using walker.  EXTREMITIES: no edema  NEURO: Oriented times three       LABS:      Lab Results   Component Value Date    WBC 5.6 01/04/2024    RBC 4.24 01/04/2024    HGB 12.2 01/04/2024    HCT 38.7 01/04/2024    MCV 91.3 01/04/2024    MCH 28.8 01/04/2024    MCHC 31.5 01/04/2024    RDW 13.3 01/04/2024    .0 01/04/2024      Lab Results   Component Value Date    GLU 96 01/04/2024    BUN 13 01/04/2024    BUNCREA NOT APPLICABLE 07/06/2022    CREATSERUM 0.86 01/04/2024    ANIONGAP 4 01/04/2024    GFR 72 12/08/2016    GFRNAA 69 07/06/2022    GFRAA 80 07/06/2022    CA 9.4 01/04/2024    OSMOCALC 288 01/04/2024    ALKPHO 90 09/26/2023    AST 18 09/26/2023    ALT 23 09/26/2023    BILT 0.6 09/26/2023    TP 7.4 09/26/2023    ALB 3.8 09/26/2023    GLOBULIN 3.6 09/26/2023    AGRATIO 1.8 07/06/2022     01/04/2024    K 4.2 01/04/2024     01/04/2024    CO2 29.0 01/04/2024      Lab Results   Component Value Date    CHOLEST 137 09/26/2023    TRIG 131 09/26/2023    HDL 56 09/26/2023    LDL 58 09/26/2023    VLDL 19 09/26/2023    TCHDLRATIO 2.6 07/06/2022    NONHDLC 81 09/26/2023      Lab Results   Component  Value Date    TSH 1.770 09/26/2023      Lab Results   Component Value Date     07/20/2021    A1C 5.9 (H) 07/06/2022        ASSESSMENT AND PLAN:   1. Gallbladder polyp  - no RUQ pain  -repeat US ordered  - US ABDOMEN COMPLETE (CPT=76700); Future    2. Mass of left axilla  - nickel size mass present. Looks like a sebaceous cyst.  - do US to r/o enlarged lymph node  - US AXILLARY LEFT SH(CPT=76882); Future    3. Fungal dermatitis  -Start clotrimazole to abdomen and monitor effectiveness.  - clotrimazole 1 % External Cream; Apply 1 each topically 2 (two) times daily as needed.  Dispense: 1 each; Refill: 0    4. Screening for cholesterol level  - Lipid Panel; Future    5. Screening for thyroid disorder  - TSH W Reflex To Free T4; Future    6. Vitamin D deficiency  - Vitamin D; Future    7. Physical exam  - CBC With Differential With Platelet; Future  - Comp Metabolic Panel (14); Future     The patient indicates understanding of these issues and agrees to the plan.  The patient is asked to return after labs are completed in September or sooner as needed.

## 2024-06-12 DIAGNOSIS — K82.4 GALLBLADDER POLYP: Primary | ICD-10-CM

## 2024-06-12 DIAGNOSIS — D18.03 LIVER HEMANGIOMA: ICD-10-CM

## 2024-06-19 ENCOUNTER — HOSPITAL ENCOUNTER (OUTPATIENT)
Dept: LAB | Facility: HOSPITAL | Age: 89
Discharge: HOME | End: 2024-06-19
Attending: INTERNAL MEDICINE

## 2024-06-19 DIAGNOSIS — Z79.01 LONG TERM (CURRENT) USE OF ANTICOAGULANTS: ICD-10-CM

## 2024-06-19 LAB — INR BLDC: 2.6 (ref 0.9–1.1)

## 2024-06-19 PROCEDURE — 85610 PROTHROMBIN TIME: CPT

## 2024-08-27 ENCOUNTER — HOSPITAL ENCOUNTER (OUTPATIENT)
Dept: LAB | Facility: HOSPITAL | Age: 89
Discharge: HOME | End: 2024-08-27
Attending: INTERNAL MEDICINE
Payer: MEDICARE

## 2024-08-27 DIAGNOSIS — I48.0 PAROXYSMAL ATRIAL FIBRILLATION (HCC): ICD-10-CM

## 2024-08-27 LAB — INR BLDC: 2.4 (ref 0.9–1.1)

## 2024-08-27 PROCEDURE — 85610 PROTHROMBIN TIME: CPT | Performed by: INTERNAL MEDICINE

## 2024-10-29 ENCOUNTER — TELEPHONE (OUTPATIENT)
Dept: INTERNAL MEDICINE CLINIC | Facility: CLINIC | Age: 89
End: 2024-10-29

## 2024-10-29 NOTE — TELEPHONE ENCOUNTER
Date of pre-op appt:  11/6/24  Provider pre-op scheduled with: Nakia   Surgeon's name:  Dr Fonseca   Phone #:  [phone]   Fax #:  [phone]  Surgery date:  11/19/24  Procedure/diagnosis:  vulvectomy  Does patient routinely see Cardiology or Pulmonology? yes  If yes, please inform Pt that they may need clearance from them also    Pre op form given to: Rosi  Pre op faxed to Dr Fonseca

## 2024-11-06 ENCOUNTER — OFFICE VISIT (OUTPATIENT)
Dept: INTERNAL MEDICINE CLINIC | Facility: CLINIC | Age: 89
End: 2024-11-06
Payer: MEDICARE

## 2024-11-06 VITALS
HEIGHT: 58 IN | HEART RATE: 60 BPM | SYSTOLIC BLOOD PRESSURE: 106 MMHG | DIASTOLIC BLOOD PRESSURE: 60 MMHG | WEIGHT: 123.38 LBS | RESPIRATION RATE: 16 BRPM | OXYGEN SATURATION: 100 % | BODY MASS INDEX: 25.9 KG/M2 | TEMPERATURE: 97 F

## 2024-11-06 DIAGNOSIS — Z23 NEED FOR VACCINATION: ICD-10-CM

## 2024-11-06 DIAGNOSIS — I10 ESSENTIAL HYPERTENSION: ICD-10-CM

## 2024-11-06 DIAGNOSIS — Z79.01 LONG TERM (CURRENT) USE OF ANTICOAGULANTS: ICD-10-CM

## 2024-11-06 DIAGNOSIS — I35.1 MILD AORTIC INSUFFICIENCY: ICD-10-CM

## 2024-11-06 DIAGNOSIS — I51.89 DIASTOLIC DYSFUNCTION: ICD-10-CM

## 2024-11-06 DIAGNOSIS — R73.03 PRE-DIABETES: ICD-10-CM

## 2024-11-06 DIAGNOSIS — K82.4 GALLBLADDER POLYP: ICD-10-CM

## 2024-11-06 DIAGNOSIS — M47.812 CERVICAL SPONDYLOSIS: ICD-10-CM

## 2024-11-06 DIAGNOSIS — Z01.818 PREOP EXAM FOR INTERNAL MEDICINE: Primary | ICD-10-CM

## 2024-11-06 DIAGNOSIS — J47.9 BRONCHIECTASIS WITHOUT COMPLICATION (HCC): ICD-10-CM

## 2024-11-06 DIAGNOSIS — C51.9 SQUAMOUS CELL CARCINOMA OF VULVA (HCC): ICD-10-CM

## 2024-11-06 DIAGNOSIS — I48.0 PAROXYSMAL ATRIAL FIBRILLATION (HCC): ICD-10-CM

## 2024-11-06 DIAGNOSIS — N81.4 UTEROVAGINAL PROLAPSE: ICD-10-CM

## 2024-11-06 DIAGNOSIS — E78.2 MIXED HYPERLIPIDEMIA: ICD-10-CM

## 2024-11-06 PROCEDURE — 99499 UNLISTED E&M SERVICE: CPT | Performed by: NURSE PRACTITIONER

## 2024-11-06 PROCEDURE — 99215 OFFICE O/P EST HI 40 MIN: CPT | Performed by: NURSE PRACTITIONER

## 2024-11-06 PROCEDURE — G0008 ADMIN INFLUENZA VIRUS VAC: HCPCS | Performed by: NURSE PRACTITIONER

## 2024-11-06 PROCEDURE — 90662 IIV NO PRSV INCREASED AG IM: CPT | Performed by: NURSE PRACTITIONER

## 2024-11-06 NOTE — PATIENT INSTRUCTIONS
COVID vaccine recommended    Get your labs done. You should be fasting for at least 10 hours. If you take a multivitamin with Biotin or any biotin product it should be held for 3 days prior to getting your labs done.     Get clearance from cardiology as well    Stop NSAIDs one week before surgery    Montior for signs of infection (redness, warmth, swelling, drainage, pain) after sugery    Monitor for signs of pneumonia after surgery (shortness of breath, productive cough, fever, chills)    Monitor for signs of blood clot after surgery ( leg pain, swelling, warmth, redness)    Monitor for constipation and use stool softeners/laxatives as needed. You may do prune punch daily as needed (4oz of prune juice, 4 oz of orange juice heated in the microwave and add one dose of milk of magnesia over the counter).      Follow up in March for your annual visit or sooner as needed

## 2024-11-06 NOTE — PROGRESS NOTES
[facility]  PRE OP RISK ASSESSMENT    REASON FOR CONSULT: Pre-op risk assessment for surgical procedure anterior radical vulvectomy on 11/19/24 with general anesthesia.    REQUESTING PHYSICIAN: Dr. Fonseca    CHIEF COMPLAINT:   Chief Complaint   Patient presents with    Pre-Op Exam     Vulvectomy Surgery on 11/19/202024 with       HISTORY OF PRESENT ILLNESS:   The patient is a 89 year old  female here for a preop evaluation. She has invasive squamous cell carcinoma and is scheduled for the above procedure. She has a significant PMH of afib on AC, diastolic dysfunction, mild aortic insuff, HTN, HLD, prediabetes, bronchiectasis, gallbladder polyp, cervical spondylosis, uterovaginal prolapse. She is independent with ADLs. She denies chest pain or sob at rest or with exertion.       Past Medical History:    Past Medical History:    Acute sinusitis    Arrhythmia    A-fib attacks 2003 and 2004    Asymptomatic microscopic hematuria    Since at least 2019 - seeing urologist 2022    Atrial fibrillation (HCC)    Benign paroxysmal vertigo    Breast lump    lower right breast    Bronchiectasis (HCC)    Cervical spondylosis    Cervical strain    Cervicalgia    Chronic cough    Diastolic dysfunction    Early cataract    Exudative age-related macular degeneration, left eye, with active choroidal neovascularization (HCC)    Hand fracture    third metacarpal phalangeal joint    Hard of hearing    Hearing impairment    NO HEARING AIDS    Heart disease    Heart murmur    on exam 2/1/22. Patient states she has a leaky valve    Herpes zoster    High blood pressure    High cholesterol    History of bone density study    Hx of motion sickness    IGT (impaired glucose tolerance)    Infection    right hand    Insomnia    Lipid screening    Liver lesion    Probably focal steatosis Unchanged 7 mos later, c/w hemangioma    Microscopic hematuria    Unremarkable cystoscopy 1/25/22 Dr. Fields. Renal US normal. CT urogram  neg. 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 Left clitoral rocha. Dr. Rishabh Fonseca    Visual impairment    GLASSES    Vulvar cysts        Past Surgical History:    Past Surgical History:   Procedure Laterality Date    Appendectomy  20 y/o    Colonoscopy  2007    diverticulosis    Colonoscopy  05/30/2013    Procedure: COLONOSCOPY;  Surgeon: Júnior Lawrence MD;  Location:  ENDOSCOPY    Correct bunion,simple      Cystourethroscopy  01/25/2022    Dr. Fields [facility] - unremarkable cystoscopy in office. H/o asymptomatic microscopic hematuria with h/o normal renal US. CT urogram ordered.    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 Left clitoral rocha. Dr. Rishabh Fonseca       Current Medications:    Current Outpatient Medications   Medication Sig Dispense Refill    traMADol 50 MG Oral Tab Take 1 tablet (50 mg total) by mouth every 8 (eight) hours as needed.      warfarin 5 MG Oral Tab TAKE ONE-HALF TO ONE TABLET DAILY AS DIRECTED BY ANTICOAGULATION CLINIC 80 tablet 3    Losartan Potassium 25 MG Oral Tab Take 1 tablet (25 mg total) by mouth daily. 90 tablet 3    atorvastatin 10 MG Oral Tab Take 1 tablet (10 mg total) by mouth nightly. 90 tablet 3    digoxin (DIGOX) 0.125 MG Oral Tab TAKE 1 TABLET(125 MCG) BY MOUTH EVERY DAY 90 tablet 3    METOPROLOL TARTRATE 50 MG Oral Tab TAKE ONE TABLET BY MOUTH TWICE DAILY. 180 tablet 3     prednisoLONE acetate 1 % Ophthalmic Suspension Place 1 drop into both eyes 2 (two) times daily.  1    Vitamin D3 2000 units Oral Cap Take 1 capsule (2,000 Units total) by mouth daily.      ketorolac tromethamine 0.5 % Ophthalmic Solution Place 1 drop into both eyes 2 (two) times daily. 6AM AND 6PM.      Meclizine HCl 12.5 MG Oral Tab Take 1 tablet (12.5 mg total) by mouth 3 (three) times daily as needed.      Dorzolamide HCl-Timolol Mal 22.3-6.8 MG/ML Ophthalmic Solution Place 1 drop into the right eye 2 (two) times daily.  2    Acetaminophen 325 MG Oral Cap Take by mouth.      clotrimazole 1 % External Cream Apply 1 each topically 2 (two) times daily as needed. (Patient not taking: Reported on 11/6/2024) 1 each 0         Allergies:    Allergies as of 11/06/2024 - Review Complete 11/06/2024   Allergen Reaction Noted    Amoxicillin SWELLING 01/29/2019       SOCIAL HISTORY:   Social History     Socioeconomic History    Marital status:      Spouse name: Not on file    Number of children: Not on file    Years of education: Not on file    Highest education level: Not on file   Occupational History    Not on file   Tobacco Use    Smoking status: Never    Smokeless tobacco: Never   Vaping Use    Vaping status: Never Used   Substance and Sexual Activity    Alcohol use: Yes     Alcohol/week: 0.0 - 0.8 standard drinks of alcohol     Comment: occ    Drug use: No    Sexual activity: Not Currently   Other Topics Concern     Service Not Asked    Blood Transfusions Not Asked    Caffeine Concern Yes    Occupational Exposure Not Asked    Hobby Hazards Not Asked    Sleep Concern Not Asked    Stress Concern Not Asked    Weight Concern Not Asked    Special Diet Not Asked    Back Care Not Asked    Exercise Yes     Comment: some    Bike Helmet Not Asked    Seat Belt Not Asked    Self-Exams Not Asked   Social History Narrative    Not on file     Social Drivers of Health     Financial Resource Strain: Not on file   Food  Insecurity: Not on file   Transportation Needs: Not on file   Physical Activity: Not on file   Stress: Not on file   Social Connections: Not on file   Housing Stability: Not on file        FAMILY HISTORY:   Family History   Problem Relation Age of Onset    Other (bladder cancer) Father 80    Colon Cancer Mother 87    Breast Cancer Other         niece and herman    Other (DM) Other     Heart Disease Brother     Other (Tobacco use) Sister     Other (Atrial fibrillation) Sister     Other (MI,) Brother 52        REVIEW OF SYSTEMS:  PRE-OP ROS  NSAIDS/PLATELET INH: no NSAIDs. On warfarin therapy   DIABETIC MEDICATIONS: None  ISAMAR; None  Hx of VTE: None  BLEEDING DISORDER: None  LOOSE DENTITION OR DENTAL APPLIANCES: upper and lower dentures  URI, COUGH, CP, FEVER: chronic sinusitis   CERVICAL SPINE RESTRICTION: No pain, has cervical spondylosis.     PHYSICAL EXAM:  Resp 16   Ht 4' 10\" (1.473 m)   Wt 123 lb 6.4 oz (56 kg)   LMP  (LMP Unknown)   BMI 25.79 kg/m²   Body mass index is 25.79 kg/m².   GENERAL: well developed, well nourished,in no apparent distress  HEENT: atraumatic, normocephalic  LUNGS: clear to auscultation; no rhonchi, rales, or wheezing  CARDIO: irregular rhythm with murmur  GI: no tenderness  MUSCULOSKELETAL: abnormal gait- using walker  EXTREMITIES: no edema  NEURO: Oriented times three     LABS:  Lab Results   Component Value Date    WBC 5.6 01/04/2024    RBC 4.24 01/04/2024    HGB 12.2 01/04/2024    HCT 38.7 01/04/2024    MCV 91.3 01/04/2024    MCH 28.8 01/04/2024    MCHC 31.5 01/04/2024    RDW 13.3 01/04/2024    .0 01/04/2024      Lab Results   Component Value Date    GLU 96 01/04/2024    BUN 13 01/04/2024    BUNCREA NOT APPLICABLE 07/06/2022    CREATSERUM 0.86 01/04/2024    ANIONGAP 4 01/04/2024    GFR 72 12/08/2016    GFRNAA 69 07/06/2022    GFRAA 80 07/06/2022    CA 9.4 01/04/2024    OSMOCALC 288 01/04/2024    ALKPHO 90 09/26/2023    AST 18 09/26/2023    ALT 23 09/26/2023    BILT 0.6  09/26/2023    TP 7.4 09/26/2023    ALB 3.8 09/26/2023    GLOBULIN 3.6 09/26/2023    AGRATIO 1.8 07/06/2022     01/04/2024    K 4.2 01/04/2024     01/04/2024    CO2 29.0 01/04/2024      Lab Results   Component Value Date    CHOLEST 137 09/26/2023    TRIG 131 09/26/2023    HDL 56 09/26/2023    LDL 58 09/26/2023    VLDL 19 09/26/2023    TCHDLRATIO 2.6 07/06/2022    NONHDLC 81 09/26/2023      Lab Results   Component Value Date    TSH 1.770 09/26/2023      Lab Results   Component Value Date     07/20/2021    A1C 5.9 (H) 07/06/2022      ASSESSMENT AND PLAN:    1. Preop exam for internal medicine  2. Squamous cell carcinoma of the vulva  -  the patient is scheduled for anterior radical vulvectomy on 11/19/24 with general anesthesia.    According to the ACC/AHA guidelines, the patient does not have a history of acute coronary syndrome. She does however have a hx of AFIB on anticoagulation, diastolic dysfunction, aortic valve insuff following with cardiology.  She therefore can proceed with the surgery with acceptable cardiac risk if cleared by cardiology.       3. Paroxysmal atrial fibrillation (HCC)  4. Long term (current) use of anticoagulants  - anticoagulation to be held per cardiology.     5. Diastolic dysfunction  - euvolemic at this time. CTM.    6. Mild aortic insufficiency  - no symptoms of sob or edema. CTM.    7. Essential hypertension  - stable. Continue current management.    8. Mixed hyperlipidemia  - stable. Continue current management.     9. Pre-diabetes  - low sugar diet and exercise     10. Bronchiectasis without complication (HCC)  - stable. No sob or persistent coughing.   - CTM    11. Gallbladder polyp  - repeat US ordered.    12. Cervical spondylosis  - no neck pain. CTM    13. Uterovaginal prolapse  - continue to see urogyne as needed    This consult was sent back to the referring physician    FRANCISCO Lincoln     Total face to face time was 40 mins, more than 50% of the time  was spent in counseling and/or coordination of care related to preop evaluation.

## 2024-11-07 ENCOUNTER — TELEPHONE (OUTPATIENT)
Dept: INTERNAL MEDICINE CLINIC | Facility: CLINIC | Age: 89
End: 2024-11-07

## 2024-11-07 DIAGNOSIS — M16.11 PRIMARY OSTEOARTHRITIS OF RIGHT HIP: Primary | ICD-10-CM

## 2024-11-07 RX ORDER — TRAMADOL HYDROCHLORIDE 50 MG/1
50 TABLET ORAL
Qty: 20 TABLET | Refills: 0 | Status: SHIPPED | OUTPATIENT
Start: 2024-11-07

## 2024-11-07 NOTE — TELEPHONE ENCOUNTER
Patient asked for tramadol at visit yesterday. She stated she has not fallen recently. No SE to the medicine and it works well. She is taking it about once a week and only as needed. She is aware of the increased risk of falling and SE due to her age. Refill sent to pharmacy.

## 2024-11-18 ENCOUNTER — HOSPITAL ENCOUNTER (OUTPATIENT)
Dept: LAB | Facility: HOSPITAL | Age: 89
Discharge: HOME | End: 2024-11-18
Attending: INTERNAL MEDICINE
Payer: MEDICARE

## 2024-11-18 ENCOUNTER — OFFICE VISIT (OUTPATIENT)
Dept: SURGERY | Facility: CLINIC | Age: 89
End: 2024-11-18
Payer: MEDICARE

## 2024-11-18 VITALS
WEIGHT: 122 LBS | BODY MASS INDEX: 25.61 KG/M2 | DIASTOLIC BLOOD PRESSURE: 51 MMHG | TEMPERATURE: 98 F | HEIGHT: 58 IN | SYSTOLIC BLOOD PRESSURE: 114 MMHG | RESPIRATION RATE: 18 BRPM | HEART RATE: 64 BPM | OXYGEN SATURATION: 96 %

## 2024-11-18 DIAGNOSIS — I48.0 PAROXYSMAL ATRIAL FIBRILLATION (HCC): ICD-10-CM

## 2024-11-18 DIAGNOSIS — J47.9 BRONCHIECTASIS WITHOUT COMPLICATION (HCC): Primary | ICD-10-CM

## 2024-11-18 DIAGNOSIS — C51.9 SQUAMOUS CELL CARCINOMA OF VULVA (HCC): ICD-10-CM

## 2024-11-18 LAB — INR BLDC: 3 (ref 0.9–1.1)

## 2024-11-18 PROCEDURE — 85610 PROTHROMBIN TIME: CPT | Performed by: INTERNAL MEDICINE

## 2024-11-18 PROCEDURE — 99205 OFFICE O/P NEW HI 60 MIN: CPT | Performed by: SURGERY

## 2024-11-18 NOTE — CONSULTS
[facility]      Patient Name:  Mandie Amin   YOB: 1934   Gender:  Female   Appt Date:  11/18/2024   Provider:  Lee Patel MD     PATIENT PROVIDERS  Referring Provider: Lowell Cleary MD    Primary Care Provider:Roro Middleton MD   Address: [address] [facility] [address] [place] [id number]   Phone #: [phone]       CHIEF COMPLAINT  No chief complaint on file.       PROBLEMS  Reviewed   Patient Active Problem List   Diagnosis    Cervical spondylosis    Paroxysmal atrial fibrillation (HCC)    Diastolic dysfunction    Mild aortic insufficiency    Hyperlipidemia    Bronchiectasis (HCC)    Essential hypertension    Long term (current) use of anticoagulants    Pre-diabetes    Primary osteoarthritis of right hip    Cystoid macular edema of right eye    Gallbladder polyp    Microhematuria    PHN (postherpetic neuralgia)    Uterovaginal prolapse    Vaginal atrophy    Chronic vulvitis    Urethral caruncle    Cystocele, midline    Urgency incontinence    Vulvar dystrophy    Squamous cell carcinoma of vulva (HCC)        History of Present Illness:  Patient is a 89 year old woman who is currently being referred for consideration of surgical oncology management of squamous cell carcinoma of the vulva.     6/9/2023: Consultation with OBGYN Kristen Oneil MD with complaints of prolapse/bulge/irritation of clitoris. Had tried clobetasol, estrogen cream,and nystatin with no improvement. Exam noted raw and irregularly textured area inferior to clitoris. Referred to Gyn Onc for biopsy.     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  Recommended laser vaporization of the vulva.  Patient did not undergo treatment at that time due to patient concerns for cardiac risk.     Returned to OBGYN as symptoms continued to worsen. Recommended GYN ONC follow up again.     10/10/2024: Repeat vulvar biopsy by Dr Fonseca --> invasive moderately  differentiated squamous cell carcinoma  Recommended anterior radial vulvectomy with bilateral sentinel groin node biopsies, as well as pre operative CT A/P.    She did not get a CT scan as of yet. Her son is getting a CT on Wednesday which she is anticipating the results for. She has some pain where the lesion is on her vulva that is tender to touch. No vaginal drainage or bleeding. No history of cancer prior to this.      Vital Signs:  /51 (BP Location: Right arm, Patient Position: Sitting, Cuff Size: adult)   Pulse 64   Temp 98.1 °F (36.7 °C) (Temporal)   Resp 18   Ht 1.473 m (4' 10\")   Wt 55.3 kg (122 lb)   LMP  (LMP Unknown)   SpO2 96%   BMI 25.50 kg/m²      Medications Reviewed:    Current Outpatient Medications:     traMADol 50 MG Oral Tab, Take 1 tablet (50 mg total) by mouth daily as needed for Pain., Disp: 20 tablet, Rfl: 0    Acetaminophen 325 MG Oral Cap, Take by mouth., Disp: , Rfl:     warfarin 5 MG Oral Tab, TAKE ONE-HALF TO ONE TABLET DAILY AS DIRECTED BY ANTICOAGULATION CLINIC, Disp: 80 tablet, Rfl: 3    Losartan Potassium 25 MG Oral Tab, Take 1 tablet (25 mg total) by mouth daily., Disp: 90 tablet, Rfl: 3    atorvastatin 10 MG Oral Tab, Take 1 tablet (10 mg total) by mouth nightly., Disp: 90 tablet, Rfl: 3    digoxin (DIGOX) 0.125 MG Oral Tab, TAKE 1 TABLET(125 MCG) BY MOUTH EVERY DAY, Disp: 90 tablet, Rfl: 3    METOPROLOL TARTRATE 50 MG Oral Tab, TAKE ONE TABLET BY MOUTH TWICE DAILY., Disp: 180 tablet, Rfl: 3    prednisoLONE acetate 1 % Ophthalmic Suspension, Place 1 drop into both eyes 2 (two) times daily., Disp: , Rfl: 1    ketorolac tromethamine 0.5 % Ophthalmic Solution, Place 1 drop into both eyes 2 (two) times daily. 6AM AND 6PM., Disp: , Rfl:     Dorzolamide HCl-Timolol Mal 22.3-6.8 MG/ML Ophthalmic Solution, Place 1 drop into the right eye 2 (two) times daily., Disp: , Rfl: 2    clotrimazole 1 % External Cream, Apply 1 each topically 2 (two) times daily as needed. (Patient not  taking: Reported on 11/18/2024), Disp: 1 each, Rfl: 0    Vitamin D3 2000 units Oral Cap, Take 1 capsule (2,000 Units total) by mouth daily. (Patient not taking: Reported on 11/18/2024), Disp: , Rfl:     Meclizine HCl 12.5 MG Oral Tab, Take 1 tablet (12.5 mg total) by mouth 3 (three) times daily as needed. (Patient not taking: Reported on 11/18/2024), Disp: , Rfl:      Allergies Reviewed:  Allergies[1]     History:  Reviewed:  Past Medical History:    Acute sinusitis    Arrhythmia    A-fib attacks 2003 and 2004    Asymptomatic microscopic hematuria    Since at least 2019 - seeing urologist 2022    Atrial fibrillation (HCC)    Benign paroxysmal vertigo    Breast lump    lower right breast    Bronchiectasis (HCC)    Cervical spondylosis    Cervical strain    Cervicalgia    Chronic cough    Diastolic dysfunction    Early cataract    Exudative age-related macular degeneration, left eye, with active choroidal neovascularization (HCC)    Hand fracture    third metacarpal phalangeal joint    Hard of hearing    Hearing impairment    NO HEARING AIDS    Heart disease    Heart murmur    on exam 2/1/22. Patient states she has a leaky valve    Herpes zoster    High blood pressure    High cholesterol    History of bone density study    Hx of motion sickness    IGT (impaired glucose tolerance)    Infection    right hand    Insomnia    Lipid screening    Liver lesion    Probably focal steatosis Unchanged 7 mos later, c/w hemangioma    Microscopic hematuria    Unremarkable cystoscopy 1/25/22 Dr. Fields. Renal US normal. CT urogram neg. 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 Left clitoral rocha. Dr. Rishabh Fonseca    Visual impairment    GLASSES    Vulvar cysts      Reviewed:  Past Surgical History:   Procedure Laterality Date    Appendectomy  20 y/o    Colonoscopy  2007    diverticulosis    Colonoscopy  05/30/2013    Procedure: COLONOSCOPY;  Surgeon: Júnior Lawrence MD;  Location:  ENDOSCOPY    Correct bunion,simple      Cystourethroscopy  01/25/2022    Dr. Fields [facility] - unremarkable cystoscopy in office. H/o asymptomatic microscopic hematuria with h/o normal renal US. CT urogram ordered.    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 Left clitoral rocha. Dr. Rishabh Fonseca      Reviewed Social History:  Social History     Socioeconomic History    Marital status:    Tobacco Use    Smoking status: Never    Smokeless tobacco: Never   Vaping Use    Vaping status: Never Used   Substance and Sexual Activity    Alcohol use: Yes     Alcohol/week: 0.0 - 0.8 standard drinks of alcohol     Comment: occ    Drug use: No    Sexual activity: Not Currently   Other Topics Concern    Caffeine Concern Yes    Exercise Yes     Comment: some      Reviewed:  Family History   Problem Relation Age of Onset    Other (bladder cancer) Father 80    Colon Cancer Mother 87    Breast Cancer Other         niece and herman    Other (DM) Other     Heart Disease Brother     Other (Tobacco use) Sister     Other (Atrial fibrillation) Sister     Other (MI,) Brother 52      Review of Systems:  GENERAL HEALTH: feels well, no fatigue.   RESPIRATORY: denies shortness of breath, wheezing or cough   CARDIOVASCULAR: denies chest pain, SOB, edema,orthopnea, no palpitations   GI: denies nausea, vomiting, constipation, diarrhea; no rectal bleeding  GENITAL/: Patient squamous cell carcinoma  site  MUSCULOSKELETAL: no joint complaints, no back pain  NEURO: no tingling, numbness, weakness  ENDOCRINE: denies weight loss/gain  PSYCH: no mood changes       Physical Examination:  Constitutional: NAD.   Eyes: Sclera: non-icteric.   Neck: Neck: supple.   Lymph Nodes: Lymph Nodes no cervical LAD, supraclavicular LAD, axillary LAD, or inguinal LAD.   Lungs: Normal respiratory effort.  Cardiovascular: Well-perfused.  Abdomen: Soft, no masses.  GYN: Ulcerated 1-1.5 centimeter lesion centrally located left vulva in proximity but does not involve urethral orifice.  Vaginal mucosa unremarkable.  Musculoskeletal: Extremities: no edema.   Skin: Inspection and palpation: no jaundice.      Document Review:  Final Diagnosis    Vulva, biopsy:         Invasive moderately-differentiated squamous cell carcinoma          See COMMENT     COMMENT:  This case was reviewed by intradepartmental consultation and with reference to the patient's clinical history of a prior vulvar biopsy demonstrating moderate to severe dysplasia (CHULA 2-3). The current biopsy demonstrates invasive squamous cell carcinoma extending to the inked base of the specimen, with at least 2.5 mm depth of invasion.        Procedure(s):  None     Assessment / Plan:  Squamous cell carcinoma of vulva, left  -Central lesion at least 2.5 mm invasion  Findings were discussed with patient at length.  Options and recommendations discussed.  I recommended vulvectomy with bilateral sentinel lymph node biopsies.  Given resultant defect, I recommended patient to meet with our plastic surgery colleagues for consideration of reconstruction.  To this regard, patient will meet with Dr. Sarah.  Proximity to urethra and implications to surgical management discussed.  The surgical treatment matter including indications, rationale, and risks was discussed in detail.  Patient agreed and understood.  Arrangements will be made to schedule the procedure.  Patient had ample time to ask  questions.     Paroxysmal atrial fibrillation (HCC)  -On Warfarin, Metoprolol, Digoxin   -Cardiac preoperative risk assessment     Bronchiectasis without complication   -No recent issues            Electronically Signed by: Lee Patel MD           [1]   Allergies  Allergen Reactions    Amoxicillin SWELLING     Swollen tongue

## 2024-11-18 NOTE — PATIENT INSTRUCTIONS
Surgery:  Left vulvectomy with sentinel lymph node biopsy    Date of Surgery:    Hospital:    [address] [address]  Phone: [phone]    This is an Outpatient procedure.  Use the provided Chlorhexadine surgical soap(instructions attached) to shower the night before and morning of your procedure.  Do not apply powders, creams, lotions or deodorant after showering.  Do not apply any kind of makeup and make sure to remove nail polish prior to your surgery.  For faster recovery from anesthesia and surgery please follow the instructions below regarding your pre-op diet:  12 hours prior to your surgery time you are to drink one 10oz bottle of Ensure Pre-Surgery Drink. You are to have NO solid food or water after 11pm the night before your surgery EXCEPT one additional 10oz bottle of Ensure Pre-Surgery Drink. You need to finish drinking this 4 hours prior to surgery time.  Take Tylenol 1000mg by mouth at the time of your second Ensure Pre-Surgery drink(4hrs prior to surgery time), unless instructed otherwise by your surgeon.   Bowel Prep: No   If you take Insulin contact your primary care physician for specific instructions regarding dosing around your surgery.  Do not drink alcohol or smoke tobacco products 24 hours prior to procedure.   Bring your picture ID and insurance card with you.  Wear comfortable clothing that can easily be removed. Preferably, something that zips, snaps, or buttons up the front.   You will be contacted by the hospital  for pre-admission COVID-19 testing and the day prior to your surgery to confirm details and give you specific instructions about when and where to arrive the day of your procedure.   If you are taking blood thinners including: Plavix, Eliquis, Xarelto, Coumadin, full strength Aspirin you will need to contact the prescribing provider for specific instructions on holding these medications for your procedure.  Inform your primary care  physician of your surgery and ask if him/her will need to see you prior to surgery.  If you develop symptoms of another illness prior to surgery please contact our office immediately.   If this is an inpatient surgery, attending the Surgical Oncology Pre-operative Education Class is strongly encouraged. Email [email or web address] to RSVP or for more class information.       Pre-Operative Testing  x CBC x CMP  BMP    PT, PTT, INR  UA x EKG    Chest X-Ray x Cardiac Clearance with anticoagulation hold x H & P Medical Clearance     Lee Patel MD, FACS  Chief of Surgical Oncology  Co-Medical Director, Oncology Services  Professor of Surgery    For Dr. Patel's office: [phone]  Fax: [phone]  After hours you will reach the answering service    Pre-Admission [phone]  Central [phone]  Medical Records:   [phone]

## 2024-11-20 ENCOUNTER — TELEPHONE (OUTPATIENT)
Dept: SURGERY | Facility: CLINIC | Age: 89
End: 2024-11-20

## 2024-11-20 NOTE — TELEPHONE ENCOUNTER
Called patient regarding TB recommendations including option for immunotherapy. Discussed that surgery and radiation are still options, but group agreed immunotherapy could be a good option to at least start with. She expressed understanding and would like some time to think over her options. Will call to check in Friday.     VINOD Causey

## 2024-11-26 ENCOUNTER — TELEPHONE (OUTPATIENT)
Dept: SURGERY | Facility: CLINIC | Age: 89
End: 2024-11-26

## 2024-11-26 NOTE — TELEPHONE ENCOUNTER
Spoke to patient regarding decision about treatment for SCC. She would like consultation to further discuss immunotherapy with medical oncology. Message sent to medical oncology office. She is scheduled for staging CT on 12/2.2024.     VINOD Causey

## 2024-12-02 ENCOUNTER — HOSPITAL ENCOUNTER (OUTPATIENT)
Dept: CT IMAGING | Facility: HOSPITAL | Age: 89
Discharge: HOME | End: 2024-12-02
Payer: MEDICARE

## 2024-12-02 ENCOUNTER — HOSPITAL ENCOUNTER (OUTPATIENT)
Dept: GENERAL RADIOLOGY | Facility: HOSPITAL | Age: 89
Discharge: HOME | End: 2024-12-02
Payer: MEDICARE

## 2024-12-02 DIAGNOSIS — C51.9 SQUAMOUS CELL CARCINOMA OF VULVA (HCC): ICD-10-CM

## 2024-12-02 LAB
CREAT BLD-MCNC: 0.7 MG/DL
EGFRCR SERPLBLD CKD-EPI 2021: 82 ML/MIN/1.73M2 (ref 60–?)

## 2024-12-02 PROCEDURE — 71046 X-RAY EXAM CHEST 2 VIEWS: CPT

## 2024-12-02 PROCEDURE — 82565 ASSAY OF CREATININE: CPT

## 2024-12-02 PROCEDURE — 74177 CT ABD & PELVIS W/CONTRAST: CPT

## 2024-12-05 ENCOUNTER — TELEPHONE (OUTPATIENT)
Dept: SURGERY | Facility: CLINIC | Age: 89
End: 2024-12-05

## 2024-12-05 NOTE — TELEPHONE ENCOUNTER
Returned patient's voicemail in regard to recent CT and CXR. No enlarged lymph nodes in pelvis and no concerning lung nodules. Patient states she also called because she needs to reschedule her appointment with medical oncology tomorrow due to doctor appointments for her son. Will reach out to med onc team to reschedule.     VINOD Causey

## 2024-12-06 ENCOUNTER — APPOINTMENT (OUTPATIENT)
Dept: HEMATOLOGY/ONCOLOGY | Facility: HOSPITAL | Age: 89
End: 2024-12-06
Attending: INTERNAL MEDICINE
Payer: MEDICARE

## 2024-12-10 ENCOUNTER — TELEPHONE (OUTPATIENT)
Dept: INTERNAL MEDICINE CLINIC | Facility: CLINIC | Age: 89
End: 2024-12-10

## 2024-12-10 NOTE — TELEPHONE ENCOUNTER
Talked to pt and pt's  other son . Pt's son in the hospital has Cdiff . Pt did not touch anything and talk to person with cdiff for 2 mins and disinfect their shoes. Advised should be OK but if develop diarrhea would need to be checked out. . Patient notified. Patient verbalized understanding

## 2024-12-12 ENCOUNTER — OFFICE VISIT (OUTPATIENT)
Dept: HEMATOLOGY/ONCOLOGY | Facility: HOSPITAL | Age: 89
End: 2024-12-12
Attending: INTERNAL MEDICINE
Payer: MEDICARE

## 2024-12-12 ENCOUNTER — TELEPHONE (OUTPATIENT)
Dept: HEMATOLOGY/ONCOLOGY | Facility: HOSPITAL | Age: 89
End: 2024-12-12

## 2024-12-12 VITALS
DIASTOLIC BLOOD PRESSURE: 78 MMHG | WEIGHT: 122 LBS | OXYGEN SATURATION: 99 % | RESPIRATION RATE: 16 BRPM | BODY MASS INDEX: 25.61 KG/M2 | HEIGHT: 57.99 IN | SYSTOLIC BLOOD PRESSURE: 161 MMHG | HEART RATE: 68 BPM | TEMPERATURE: 98 F

## 2024-12-12 DIAGNOSIS — Z85.44 HX OF MALIGNANT NEOPLASM OF VULVA: ICD-10-CM

## 2024-12-12 DIAGNOSIS — C51.9 VULVAR CANCER, CARCINOMA (HCC): Primary | ICD-10-CM

## 2024-12-12 PROCEDURE — 99205 OFFICE O/P NEW HI 60 MIN: CPT | Performed by: INTERNAL MEDICINE

## 2024-12-12 NOTE — TELEPHONE ENCOUNTER
LVM for patient's son. Infusion  notified RN that patient would like to wait until mid January to start treatment. MD updated.

## 2024-12-12 NOTE — TELEPHONE ENCOUNTER
Pt called to report medication that she couldn't remember the name of at time of visit today.    Pt is taking atorvastatin 10MG

## 2024-12-12 NOTE — PROGRESS NOTES
Patient here as a new consult for squamous cell. Denies any bleeding or drainage to site. Energy levels are stable. Has on and off pain managed by tylenol. Denies any fevers.

## 2024-12-12 NOTE — PROGRESS NOTES
[facility] Note    Diagnosis: Vulvar Squamous cell carcinoma.     Treatment History:   Libtayo     Visit Diagnosis:  1. Vulvar cancer, carcinoma (HCC)    2. Hx of malignant neoplasm of vulva        History of Present Illness: 89F referred by Dr. Patel to discuss Vulvar Squamous cell carcinoma.     In June 2023, she met with gynecology for a vaginal bulge. No improvement with topical therapy. Met with Dr. Fonseca in 08/2023 and noted to have moderate-severe dysplasia  but surgery not done due to cardiac risk. Laser therapy recommended but not done. Symptoms worsened. Vulva biopsy from [facility] in 10/2024 showed invasive moderately differentiated squamous cell carcinoma. She met with Dr. Patel for a 2nd opinion and a radical vulvectomy with bilateral sentinel LN bx was discussed.  Patient herself does not want surgery.   CT AP from 12/2024 showed small sub-cm liver foci to small to characterize which is stable since 2019 per radiology. CXR unremarkable. Her case was discuss with surgical oncology and tumor board. Given the morbidity of the surgery and patient preference,  immunotherapy was recommended. No history of autoimmune disease.     Review of Systems: 12 Point ROS was completed and pertinent positives are in the HPI    Medications Ordered Prior to Encounter[1]  Past Medical History:    Acute sinusitis    Arrhythmia    A-fib attacks 2003 and 2004    Asymptomatic microscopic hematuria    Since at least 2019 - seeing urologist 2022    Atrial fibrillation (HCC)    Benign paroxysmal vertigo    Breast lump    lower right breast    Bronchiectasis (HCC)    Cervical spondylosis    Cervical strain    Cervicalgia    Chronic cough    Diastolic dysfunction    Early cataract    Exudative age-related macular degeneration, left eye, with active choroidal neovascularization (HCC)    Hand fracture    third metacarpal phalangeal joint    Hard of hearing    Hearing impairment    NO HEARING AIDS     Heart disease    Heart murmur    on exam 2/1/22. Patient states she has a leaky valve    Herpes zoster    High blood pressure    High cholesterol    History of bone density study    Hx of motion sickness    IGT (impaired glucose tolerance)    Infection    right hand    Insomnia    Lipid screening    Liver lesion    Probably focal steatosis Unchanged 7 mos later, c/w hemangioma    Microscopic hematuria    Unremarkable cystoscopy 1/25/22 Dr. Fields. Renal US normal. CT urogram neg. 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 Left clitoral rocha. Dr. Rishabh Fonseca    Visual impairment    GLASSES    Vulvar cysts     Past Surgical History:   Procedure Laterality Date    Appendectomy  20 y/o    Colonoscopy  2007    diverticulosis    Colonoscopy  05/30/2013    Procedure: COLONOSCOPY;  Surgeon: Júnior Lawrence MD;  Location:  ENDOSCOPY    Correct bunion,simple      Cystourethroscopy  01/25/2022    Dr. Fields [facility] - unremarkable cystoscopy in office. H/o asymptomatic microscopic hematuria with h/o normal renal US. CT urogram ordered.    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 Left clitoral rocha. Dr. Rishabh Fonseca     Social History     Socioeconomic History    Marital status:    Tobacco Use    Smoking status: Never    Smokeless tobacco: Never   Vaping Use    Vaping status: Never Used   Substance and Sexual Activity    Alcohol  use: Yes     Alcohol/week: 0.0 - 0.8 standard drinks of alcohol     Comment: occ    Drug use: No    Sexual activity: Not Currently      Family History   Problem Relation Age of Onset    Colon Cancer Father     Other (bladder cancer) Father 80    Other (Tobacco use) Sister     Other (Atrial fibrillation) Sister     Heart Disease Brother     Other (MI,) Brother 52    Breast Cancer Other         niece and herman    Other (DM) Other        Physical Exam  Height: 147.3 cm (4' 9.99\") (12/12 1100)  Weight: 55.3 kg (122 lb) (12/12 1100)  BSA (Calculated - sq m): 1.48 sq meters (12/12 1100)  Pulse: 68 (12/12 1100)  BP: 161/78 (12/12 1100)  Temp: 98 °F (36.7 °C) (12/12 1100)  Do Not Use - Resp Rate: --  SpO2: 99 % (12/12 1100)    General: NAD, AOX3  HEENT: clear op, mmm, no jvd, no scleral icterus  CV: RR  Extremities: No edema   Lungs: no increased work of breathing  Abd: non distended   Neuro: CN: II-XII grossly intact      Results:  Lab Results   Component Value Date    WBC 5.6 01/04/2024    HGB 12.2 01/04/2024    HCT 38.7 01/04/2024    MCV 91.3 01/04/2024    .0 01/04/2024     Lab Results   Component Value Date     01/04/2024    K 4.2 01/04/2024    CO2 29.0 01/04/2024     01/04/2024    BUN 13 01/04/2024    ALB 3.8 09/26/2023       No results found for: \"LDH\"    Radiology: reviewed     Assessment and Plan:  Vulvar Squamous Cell Carcinoma  -Appears to be localized disease with negative imaging. Given morbidly of surgery, patient preference and her age she would like to consider immunotherapy. We discussed 1 year of immunotherapy and we can also consider consolidative RT. We discussed the risks and benefits of libtayo. I recommend a baseline PET/Ct. We can repeat imaging and clinical exam after 4 cycles.     A fib: on coumadin     [facility]         [1]   Current Outpatient Medications on File Prior to Visit   Medication Sig Dispense Refill    traMADol 50 MG Oral  Tab Take 1 tablet (50 mg total) by mouth daily as needed for Pain. 20 tablet 0    Acetaminophen 325 MG Oral Cap Take by mouth.      warfarin 5 MG Oral Tab TAKE ONE-HALF TO ONE TABLET DAILY AS DIRECTED BY ANTICOAGULATION CLINIC 80 tablet 3    digoxin (DIGOX) 0.125 MG Oral Tab TAKE 1 TABLET(125 MCG) BY MOUTH EVERY DAY 90 tablet 3    METOPROLOL TARTRATE 50 MG Oral Tab TAKE ONE TABLET BY MOUTH TWICE DAILY. 180 tablet 3    prednisoLONE acetate 1 % Ophthalmic Suspension Place 1 drop into both eyes 2 (two) times daily.  1    Vitamin D3 2000 units Oral Cap Take 1 capsule (2,000 Units total) by mouth daily.      ketorolac tromethamine 0.5 % Ophthalmic Solution Place 1 drop into both eyes 2 (two) times daily. 6AM AND 6PM.      Dorzolamide HCl-Timolol Mal 22.3-6.8 MG/ML Ophthalmic Solution Place 1 drop into the right eye 2 (two) times daily.  2    clotrimazole 1 % External Cream Apply 1 each topically 2 (two) times daily as needed. (Patient not taking: Reported on 11/6/2024) 1 each 0    Losartan Potassium 25 MG Oral Tab Take 1 tablet (25 mg total) by mouth daily. (Patient not taking: Reported on 12/12/2024) 90 tablet 3    atorvastatin 10 MG Oral Tab Take 1 tablet (10 mg total) by mouth nightly. (Patient not taking: Reported on 12/12/2024) 90 tablet 3    Meclizine HCl 12.5 MG Oral Tab Take 1 tablet (12.5 mg total) by mouth 3 (three) times daily as needed. (Patient not taking: Reported on 12/12/2024)       Current Facility-Administered Medications on File Prior to Visit   Medication Dose Route Frequency Provider Last Rate Last Admin    [COMPLETED] iopamidol 76% (ISOVUE-370) injection for power injector  65 mL Intravenous ONCE PRN Mariaelena Shaw PA   65 mL at 12/02/24 7318

## 2024-12-19 ENCOUNTER — HOSPITAL ENCOUNTER (OUTPATIENT)
Dept: NUCLEAR MEDICINE | Facility: HOSPITAL | Age: 89
Discharge: HOME | End: 2024-12-19
Attending: INTERNAL MEDICINE
Payer: MEDICARE

## 2024-12-19 DIAGNOSIS — C51.9 VULVAR CANCER, CARCINOMA (HCC): ICD-10-CM

## 2024-12-19 DIAGNOSIS — Z85.44 HX OF MALIGNANT NEOPLASM OF VULVA: ICD-10-CM

## 2024-12-19 LAB — GLUCOSE BLD-MCNC: 111 MG/DL (ref 70–99)

## 2024-12-19 PROCEDURE — 82962 GLUCOSE BLOOD TEST: CPT

## 2024-12-19 PROCEDURE — 78815 PET IMAGE W/CT SKULL-THIGH: CPT | Performed by: INTERNAL MEDICINE

## 2025-01-02 ENCOUNTER — TELEPHONE (OUTPATIENT)
Age: OVER 89
End: 2025-01-02

## 2025-01-10 ENCOUNTER — OFFICE VISIT (OUTPATIENT)
Age: OVER 89
End: 2025-01-10
Attending: INTERNAL MEDICINE
Payer: MEDICARE

## 2025-01-10 DIAGNOSIS — Z71.9 HEALTH EDUCATION/COUNSELING: ICD-10-CM

## 2025-01-10 DIAGNOSIS — C51.9 VULVAR CANCER, CARCINOMA (HCC): Primary | ICD-10-CM

## 2025-01-10 NOTE — PROGRESS NOTES
Immunotherapy Education    Learner:  Patient and Family Member    Barriers / Limitations:  None    Immunotherapy education goals:  Learn the drug names  Administration schedule  Routes of administration  Treatment setting    Drug names:  Cemiplimab    Immunotherapy action on cancer / normal cells:  Achieved    Lung: pneumonitis, cough, shortness of breath  Achieved    Gastrointestinal (Colitis): Diarrhea, Mucus, blood  Achieved    Liver (Hepatitis): Yellowing of skin or eyes, Nausea or vomiting, abdominal pain on right, dark urine, excessive bruising or bleeding  Achieved    Hormone Gland (Thyroid, Pituitary, Adrenal and pancreas): rapid heart rate, weight loss or gain, increased sweating, feeling more hungry or thirsty, urinating more frequently, hair loss, feeling cold, constipation, deeper voice, muscle aches, dizziness or fainting, headache  Achieved    Kidney Problems (Nephritis): change in amount or color of urine  Achieved    Problems in other organs: Rash, change in eyesight, severe or persistent muscle or joint pains, severe muscle weakness, low red blood cell (Anemia)  Achieved    Infusion Reactions: Chills, shaking, shortness of breath or wheezing, itching or rash, flushing, dizziness, fever, feeling like passing out  Achieved      Teaching Materials Provided:     [employer] Immunotherapy information sheets   Dietician information sheet  When to contact the Treatment Team Information Sheet  [facility] Support Services Sheet  National Resources Information sheet    Patient and family were given ample opportunity to ask questions. All questions and concerns addressed. We discussed self care techniques, symptom management, fluid, diet, and activities. Patient verbalized understanding of above information provided.     Immunotherapy Consent Form signed by the patient.    I spent a total of 50 minutes with the patient, 100 % of that time was spent counseling patient regarding the above documented side effects and  management, when to call provider and contact information.       Arlin SINGH FN-BC  Nurse Practitioner  [facility]

## 2025-01-12 NOTE — ED NOTES
Podiatry Progress Note    Subjective:   Patient seen at bedside this afternoon.  No new pedal complaints.     Objective:   Vitals:  /73   Pulse 81   Temp 98.2 °F (36.8 °C) (Oral)   Resp 22   Ht 1.753 m (5' 9.02\")   Wt 102.6 kg (226 lb 3.1 oz)   SpO2 94%   BMI 33.39 kg/m²   Integument:  Full thickness ulceration at the lateral left fifth metatarsal head.  It measures approximately 0.5cm x 0.8cm x 0.2cm.  The base was granular. Slight serous drainage.  No surrounding erythema.  No purulence. The wound did not probe or undermine.  No acute signs of infection.  Otherwise, skin was warm, dry and supple, bilateral.   Neurologic:  Protective sensation is grossly diminished to light touch at the level of the distal foot, bilateral.  Vascular:  DP and PT pulses are palpable, bilateral.  CFT is brisk to the distal foot, bilateral.  No significant edema appreciated.  Musculoskeletal:  TMA on the right.  No gross musculoskeletal deformities on the left.     Labs:   CBC with Differential:    Lab Results   Component Value Date/Time    WBC 12.0 01/11/2025 04:49 AM    RBC 3.61 01/11/2025 04:49 AM    HGB 9.6 01/11/2025 04:49 AM    HCT 31.1 01/11/2025 04:49 AM     01/11/2025 04:49 AM    MCV 86.1 01/11/2025 04:49 AM    MCH 26.7 01/11/2025 04:49 AM    MCHC 30.9 01/11/2025 04:49 AM    RDW 19.6 01/11/2025 04:49 AM    BANDSPCT 2 01/07/2025 08:42 AM    METASPCT 1 04/07/2024 05:12 AM    LYMPHOPCT 2.0 01/07/2025 08:42 AM    MONOPCT 13.0 01/07/2025 08:42 AM    MYELOPCT 1 04/07/2024 05:12 AM    EOSPCT 0.0 01/07/2025 08:42 AM    BASOPCT 1.0 01/07/2025 08:42 AM    MONOSABS 1.3 01/07/2025 08:42 AM    LYMPHSABS 0.2 01/07/2025 08:42 AM    EOSABS 0.0 01/07/2025 08:42 AM    BASOSABS 0.1 01/07/2025 08:42 AM    DIFFTYPE Auto 05/17/2013 06:50 AM     CMP:    Lab Results   Component Value Date/Time     01/11/2025 04:49 AM    K 4.8 01/11/2025 04:49 AM    CL 84 01/11/2025 04:49 AM    CO2 22 01/11/2025 04:49 AM    BUN 60 01/11/2025  Pt called in and stated she had concerns about her prescribed medication for her dx of cystitis and hematuria. . Pt was prescribed ciprofloxacin 500mg, pt states she doesn't want to take the medication after reading the side effects.  The pt is requesting a

## 2025-01-17 ENCOUNTER — APPOINTMENT (OUTPATIENT)
Age: OVER 89
End: 2025-01-17
Attending: INTERNAL MEDICINE
Payer: MEDICARE

## 2025-01-22 ENCOUNTER — TELEPHONE (OUTPATIENT)
Age: OVER 89
End: 2025-01-22

## 2025-01-23 ENCOUNTER — APPOINTMENT (OUTPATIENT)
Age: OVER 89
End: 2025-01-23
Attending: INTERNAL MEDICINE
Payer: MEDICARE

## 2025-01-23 ENCOUNTER — TELEPHONE (OUTPATIENT)
Dept: SURGERY | Facility: CLINIC | Age: OVER 89
End: 2025-01-23

## 2025-01-23 NOTE — TELEPHONE ENCOUNTER
Patient called and stated she cancelled her treatment due to having vaginal bleeding.  Patient stated that sometimes it's when she wipes and other times it comes out on her pad.  Patient did not know if it was bright red when it first comes out due to her wearing a pad.  That when she checks it is dark.  She would like to have a vaginal exam prior to starting treatment.  Discussed with PA who stated she can do an exam Monday on patient.  Also sent message to medical oncology to reschedule her treatments.

## 2025-01-27 ENCOUNTER — OFFICE VISIT (OUTPATIENT)
Dept: SURGERY | Facility: CLINIC | Age: OVER 89
End: 2025-01-27
Payer: MEDICARE

## 2025-01-27 VITALS
DIASTOLIC BLOOD PRESSURE: 57 MMHG | OXYGEN SATURATION: 98 % | RESPIRATION RATE: 16 BRPM | SYSTOLIC BLOOD PRESSURE: 120 MMHG | BODY MASS INDEX: 25 KG/M2 | WEIGHT: 120 LBS | TEMPERATURE: 98 F | HEART RATE: 68 BPM

## 2025-01-27 DIAGNOSIS — C51.9 SQUAMOUS CELL CARCINOMA OF VULVA (HCC): Primary | ICD-10-CM

## 2025-01-27 NOTE — PROGRESS NOTES
Brief Surgical Oncology Note    Patient noted small amount of bleeding last week. Small spotting on pad. Has perineal/clitoral pain as well. She is no longer having bleeding.     Blood pressure 120/57, pulse 68, temperature 98 °F (36.7 °C), temperature source Tympanic, resp. rate 16, weight 54.4 kg (120 lb), SpO2 98%, not currently breastfeeding.    Constitutional:  NAD.   Eyes: non-icteric.    Abdomen: Non-distended  Vaginal Exam: Left sided erythematous inflamed plaque of vulva  Musculoskeletal: no edema.    - No acute surgical issues  - Advised to continue perineal care including pads and tylenol PRN, avoiding harsh soaps/perfumes etc  - Advised to continue follow up with Dr Camp to start immunotherapy  - Reviewed signs and symptoms to call the office or return to clinic sooner    VINOD Causey

## 2025-01-28 ENCOUNTER — DOCUMENTATION ONLY (OUTPATIENT)
Age: OVER 89
End: 2025-01-28

## 2025-01-28 ENCOUNTER — TELEPHONE (OUTPATIENT)
Age: OVER 89
End: 2025-01-28

## 2025-02-06 ENCOUNTER — HOSPITAL ENCOUNTER (OUTPATIENT)
Dept: ULTRASOUND IMAGING | Age: OVER 89
Discharge: HOME | End: 2025-02-06
Attending: NURSE PRACTITIONER
Payer: MEDICARE

## 2025-02-06 DIAGNOSIS — D18.03 LIVER HEMANGIOMA: ICD-10-CM

## 2025-02-06 DIAGNOSIS — K82.4 GALLBLADDER POLYP: ICD-10-CM

## 2025-02-06 PROCEDURE — 76700 US EXAM ABDOM COMPLETE: CPT | Performed by: NURSE PRACTITIONER

## 2025-02-10 ENCOUNTER — TELEPHONE (OUTPATIENT)
Age: OVER 89
End: 2025-02-10

## 2025-02-10 NOTE — TELEPHONE ENCOUNTER
Patient called.  She is supposed to start her therapy soon.  She wants to know if she were to fall and break her hip what would happen with her therapy?  Please call back.  Thank you.

## 2025-02-12 NOTE — PROGRESS NOTES
[facility] Note    Diagnosis: Vulvar Squamous cell carcinoma.     Treatment History:   Libtayo     Visit Diagnosis:  1. Squamous cell carcinoma of vulva (HCC)          History of Present Illness: 89F referred by Dr. Patel to discuss Vulvar Squamous cell carcinoma.     In June 2023, she met with gynecology for a vaginal bulge. No improvement with topical therapy. Met with Dr. Fonseca in 08/2023 and noted to have moderate-severe dysplasia  but surgery not done due to cardiac risk. Laser therapy recommended but not done. Symptoms worsened. Vulva biopsy from [facility] in 10/2024 showed invasive moderately differentiated squamous cell carcinoma. She met with Dr. Patel for a 2nd opinion and a radical vulvectomy with bilateral sentinel LN bx was discussed.  Patient herself does not want surgery.   CT AP from 12/2024 showed small sub-cm liver foci to small to characterize which is stable since 2019 per radiology. CXR unremarkable. Her case was discuss with surgical oncology and tumor board. Given the morbidity of the surgery and patient preference,  immunotherapy was recommended. No history of autoimmune disease. Baseline PET/CT from 12/19/24 showed increased FDG uptake in the vulva. We recommended to start start systemic but patient and family opted to wait.     Libtayo   -C1: 2/13/25    Interval History  -C1 today  -Doing well. Energy remains good  -No vaginal drainage or pelvic pain    Review of Systems: 12 Point ROS was completed and pertinent positives are in the HPI    Medications Ordered Prior to Encounter[1]  Past Medical History:    Acute sinusitis    Arrhythmia    A-fib attacks 2003 and 2004    Asymptomatic microscopic hematuria    Since at least 2019 - seeing urologist 2022    Atrial fibrillation (HCC)    Benign paroxysmal vertigo    Breast lump    lower right breast    Bronchiectasis (HCC)    Cervical spondylosis    Cervical strain    Cervicalgia    Chronic cough    Diastolic  dysfunction    Early cataract    Exudative age-related macular degeneration, left eye, with active choroidal neovascularization (HCC)    Hand fracture    third metacarpal phalangeal joint    Hard of hearing    Hearing impairment    NO HEARING AIDS    Heart disease    Heart murmur    on exam 2/1/22. Patient states she has a leaky valve    Herpes zoster    High blood pressure    High cholesterol    History of bone density study    Hx of motion sickness    IGT (impaired glucose tolerance)    Infection    right hand    Insomnia    Lipid screening    Liver lesion    Probably focal steatosis Unchanged 7 mos later, c/w hemangioma    Microscopic hematuria    Unremarkable cystoscopy 1/25/22 Dr. Fields. Renal US normal. CT urogram neg. 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 Left clitoral rocha. Dr. Rishabh Fonseca    Visual impairment    GLASSES    Vulvar cysts     Past Surgical History:   Procedure Laterality Date    Appendectomy  18 y/o    Colonoscopy  2007    diverticulosis    Colonoscopy  05/30/2013    Procedure: COLONOSCOPY;  Surgeon: Júnior Lawrence MD;  Location:  ENDOSCOPY    Correct bunion,simple      Cystourethroscopy  01/25/2022    Dr. Fields [facility] - unremarkable cystoscopy in office. H/o asymptomatic microscopic hematuria with h/o normal renal US. CT urogram ordered.    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 Left clitoral  aj. Dr. Rishabh Fonseca     Social History     Socioeconomic History    Marital status:    Tobacco Use    Smoking status: Never    Smokeless tobacco: Never   Vaping Use    Vaping status: Never Used   Substance and Sexual Activity    Alcohol use: Yes     Alcohol/week: 0.0 - 0.8 standard drinks of alcohol     Comment: occ    Drug use: No    Sexual activity: Not Currently      Family History   Problem Relation Age of Onset    Colon Cancer Father     Other (bladder cancer) Father 80    Other (Tobacco use) Sister     Other (Atrial fibrillation) Sister     Heart Disease Brother     Other (MI,) Brother 52    Breast Cancer Other         niece and herman    Other (DM) Other        Physical Exam  Height: --  Weight: --  BSA (Calculated - sq m): --  Pulse: --  BP: --  Temp: --  Do Not Use - Resp Rate: --  SpO2: --    General: NAD, AOX3  HEENT: clear op, mmm, no jvd, no scleral icterus  CV: RR  Extremities: No edema   Lungs: no increased work of breathing  Abd: non distended   Neuro: CN: II-XII grossly intact      Results:  Lab Results   Component Value Date    WBC 5.6 01/04/2024    HGB 12.2 01/04/2024    HCT 38.7 01/04/2024    MCV 91.3 01/04/2024    .0 01/04/2024     Lab Results   Component Value Date     01/04/2024    K 4.2 01/04/2024    CO2 29.0 01/04/2024     01/04/2024    BUN 13 01/04/2024    ALB 3.8 09/26/2023       No results found for: \"LDH\"    Radiology: reviewed   PET/CT 12/2024  1. Increased FDG uptake involving the vulva, predominantly in the left with associated thickening suggestive of underlying lesion, though this is not well delineated on noncontrast CT.  This may be further evaluated with contrast enhanced MRI as   clinically indicated.   2. Focal uptake within the left 12th rib corresponding to a nondisplaced fracture.  Underlying pathologic fractures not entirely excluded, correlate clinically for recent trauma.  Otherwise no evidence of distant metastatic disease.   Please see  above for further details.     Assessment and Plan:  Vulvar Squamous Cell Carcinoma  -Appears to be localized disease with negative imaging. Given morbidly of surgery, patient preference and her age she would like to consider immunotherapy. We discussed 1 year of immunotherapy and we can also consider consolidative RT. We discussed the risks and benefits of libtayo. We offered to start treatment in 12/2024 but patients opted to delay. Plan for C1 today. We can repeat imaging and clinical exam after 4 cycles.     A fib: on coumadin     [facility]         [1]   Current Outpatient Medications on File Prior to Visit   Medication Sig Dispense Refill    traMADol 50 MG Oral Tab Take 1 tablet (50 mg total) by mouth daily as needed for Pain. 20 tablet 0    Acetaminophen 325 MG Oral Cap Take by mouth.      warfarin 5 MG Oral Tab TAKE ONE-HALF TO ONE TABLET DAILY AS DIRECTED BY ANTICOAGULATION CLINIC 80 tablet 3    Losartan Potassium 25 MG Oral Tab Take 1 tablet (25 mg total) by mouth daily. 90 tablet 3    atorvastatin 10 MG Oral Tab Take 1 tablet (10 mg total) by mouth nightly. 90 tablet 3    digoxin (DIGOX) 0.125 MG Oral Tab TAKE 1 TABLET(125 MCG) BY MOUTH EVERY DAY 90 tablet 3    METOPROLOL TARTRATE 50 MG Oral Tab TAKE ONE TABLET BY MOUTH TWICE DAILY. 180 tablet 3    prednisoLONE acetate 1 % Ophthalmic Suspension Place 1 drop into both eyes 2 (two) times daily.  1    Vitamin D3 2000 units Oral Cap Take 1 capsule (2,000 Units total) by mouth daily.      ketorolac tromethamine 0.5 % Ophthalmic Solution Place 1 drop into both eyes 2 (two) times daily. 6AM AND 6PM.      Meclizine HCl 12.5 MG Oral Tab Take 1 tablet (12.5 mg total) by mouth 3 (three) times daily as needed.      Dorzolamide HCl-Timolol Mal 22.3-6.8 MG/ML Ophthalmic Solution Place 1 drop into the right eye 2 (two) times daily.  2    clotrimazole 1 % External Cream Apply 1 each topically 2 (two) times daily as needed. 1  each 0     No current facility-administered medications on file prior to visit.

## 2025-02-13 ENCOUNTER — NURSE ONLY (OUTPATIENT)
Age: OVER 89
End: 2025-02-13
Attending: INTERNAL MEDICINE
Payer: MEDICARE

## 2025-02-13 ENCOUNTER — OFFICE VISIT (OUTPATIENT)
Age: OVER 89
End: 2025-02-13
Attending: INTERNAL MEDICINE
Payer: MEDICARE

## 2025-02-13 ENCOUNTER — SOCIAL WORK SERVICES (OUTPATIENT)
Age: OVER 89
End: 2025-02-13

## 2025-02-13 VITALS
BODY MASS INDEX: 25.19 KG/M2 | HEART RATE: 64 BPM | WEIGHT: 120 LBS | RESPIRATION RATE: 16 BRPM | DIASTOLIC BLOOD PRESSURE: 67 MMHG | HEIGHT: 57.99 IN | TEMPERATURE: 98 F | SYSTOLIC BLOOD PRESSURE: 159 MMHG | OXYGEN SATURATION: 98 %

## 2025-02-13 DIAGNOSIS — C51.9 SQUAMOUS CELL CARCINOMA OF VULVA (HCC): ICD-10-CM

## 2025-02-13 DIAGNOSIS — C51.9 SQUAMOUS CELL CARCINOMA OF VULVA (HCC): Primary | ICD-10-CM

## 2025-02-13 LAB
ALBUMIN SERPL-MCNC: 4.3 G/DL (ref 3.2–4.8)
ALBUMIN/GLOB SERPL: 1.9 {RATIO} (ref 1–2)
ALP LIVER SERPL-CCNC: 86 U/L
ALT SERPL-CCNC: 9 U/L
ANION GAP SERPL CALC-SCNC: 7 MMOL/L (ref 0–18)
AST SERPL-CCNC: 20 U/L (ref ?–34)
BASOPHILS # BLD AUTO: 0.02 X10(3) UL (ref 0–0.2)
BASOPHILS NFR BLD AUTO: 0.3 %
BILIRUB SERPL-MCNC: 0.4 MG/DL (ref 0.2–0.9)
BUN BLD-MCNC: 14 MG/DL (ref 9–23)
CALCIUM BLD-MCNC: 9.4 MG/DL (ref 8.7–10.6)
CHLORIDE SERPL-SCNC: 105 MMOL/L (ref 98–112)
CO2 SERPL-SCNC: 24 MMOL/L (ref 21–32)
CREAT BLD-MCNC: 0.8 MG/DL
EGFRCR SERPLBLD CKD-EPI 2021: 70 ML/MIN/1.73M2 (ref 60–?)
EOSINOPHIL # BLD AUTO: 0.13 X10(3) UL (ref 0–0.7)
EOSINOPHIL NFR BLD AUTO: 2 %
ERYTHROCYTE [DISTWIDTH] IN BLOOD BY AUTOMATED COUNT: 13.2 %
GLOBULIN PLAS-MCNC: 2.3 G/DL (ref 2–3.5)
GLUCOSE BLD-MCNC: 90 MG/DL (ref 70–99)
HCT VFR BLD AUTO: 36.4 %
HGB BLD-MCNC: 12.1 G/DL
IMM GRANULOCYTES # BLD AUTO: 0.02 X10(3) UL (ref 0–1)
IMM GRANULOCYTES NFR BLD: 0.3 %
LYMPHOCYTES # BLD AUTO: 2.35 X10(3) UL (ref 1–4)
LYMPHOCYTES NFR BLD AUTO: 35.3 %
MCH RBC QN AUTO: 29.1 PG (ref 26–34)
MCHC RBC AUTO-ENTMCNC: 33.2 G/DL (ref 31–37)
MCV RBC AUTO: 87.5 FL
MONOCYTES # BLD AUTO: 0.61 X10(3) UL (ref 0.1–1)
MONOCYTES NFR BLD AUTO: 9.2 %
NEUTROPHILS # BLD AUTO: 3.53 X10 (3) UL (ref 1.5–7.7)
NEUTROPHILS # BLD AUTO: 3.53 X10(3) UL (ref 1.5–7.7)
NEUTROPHILS NFR BLD AUTO: 52.9 %
OSMOLALITY SERPL CALC.SUM OF ELEC: 282 MOSM/KG (ref 275–295)
PLATELET # BLD AUTO: 263 10(3)UL (ref 150–450)
POTASSIUM SERPL-SCNC: 4.4 MMOL/L (ref 3.5–5.1)
PROT SERPL-MCNC: 6.6 G/DL (ref 5.7–8.2)
RBC # BLD AUTO: 4.16 X10(6)UL
SODIUM SERPL-SCNC: 136 MMOL/L (ref 136–145)
T4 FREE SERPL-MCNC: 1.2 NG/DL (ref 0.8–1.7)
TSI SER-ACNC: 1.94 UIU/ML (ref 0.55–4.78)
WBC # BLD AUTO: 6.7 X10(3) UL (ref 4–11)

## 2025-02-13 NOTE — PROGRESS NOTES
SW completed an assessment with pt to provide support and encouragement due to new chemo starting today.        met with patient for introduction and role explanation. No distress screen on file.    Patient is hard of hearing, uses a cane and walker to ambulate,  and lives alone in [place].  Pt has four sons that are very involved in her care. Pt also has 2 granddaughters one of which is getting  in September.  Pt no longer drives and her sons drive her to all her errands and appointments.       educated on FMLA/STD benefits, encouraging patient/family to confirm benefits with employer if needed. Provided Forms Department flyer as well for any paperwork that may be required.       Pt has current POA naming her son Barrington as her agent.          Educated on available resources, providing Social Work Support Packet including [facility] Support Services, [facility], Transportation, and Home Care contacts. Educated on BHI if desired. Contact provided and family is aware to reach out with any needs. Bringing Vanna Bag given, which patient was grateful for.      [email or web address]    Kira BAUM, CHRISTINAW  [facility] Licensed Clinical

## 2025-02-13 NOTE — PROGRESS NOTES
Pt here for C1D1 Drug(s)libtayo.  Arrives Ambulating with walker, accompanied by Self     Patient was evaluated today by MD.    Oral medications included in this regimen:  no    Patient confirms comprehension of cancer treatment schedule:  yes    Pregnancy screening:  Not applicable    Modifications in dose or schedule:  No    Medications appearance and physical integrity checked by RN: yes.    Chemotherapy IV pump settings verified by 2 RNs:  No due to targeted therapy IV administration.  Frequency of blood return and site check throughout administration: Prior to administration     Infusion/treatment outcome:  patient tolerated treatment without incident    Education Record    Learner:  Patient  Barriers / Limitations:  None  Method:  Discussion  Education / instructions given:  plan of care  Outcome:  Shows understanding    Discharged Home, Ambulating with walker, accompanied by:Self    Patient/family verbalized understanding of future appointments: by printed AVS    Patient here for labs, MD, and treatment. Patient tolerated infusion without issue and left in stable condition with family to drive home. Future appointments in place and patient aware.

## 2025-02-14 ENCOUNTER — TELEPHONE (OUTPATIENT)
Age: OVER 89
End: 2025-02-14

## 2025-02-21 ENCOUNTER — TELEPHONE (OUTPATIENT)
Dept: INTERNAL MEDICINE CLINIC | Facility: CLINIC | Age: OVER 89
End: 2025-02-21

## 2025-02-21 NOTE — TELEPHONE ENCOUNTER
Patient called to follow up on her ultrasound results. She doesn't really use my chart so could someone give her a call and go over the results with her? Thanks!

## 2025-02-21 NOTE — TELEPHONE ENCOUNTER
It is stable. The radiologist said it is so small that it is of doubtful significance. We do not need to do anymore repeat imaging on this. Thanks.

## 2025-02-21 NOTE — TELEPHONE ENCOUNTER
She had the us completed to monitor the gallbladder polyp. Per result note:   Kraig Lockwood,     I have reviewed your test results. Tests show no significant abnormalities. Please let me know if you have any questions.     FRANCISCO Lincoln   Written by FRANCISCO Lincoln on 2/6/2025  3:39 PM CST    Are you able to advise is the polyp is stable or do you need addendum from radiology for comparison? Thanks!

## 2025-03-06 ENCOUNTER — OFFICE VISIT (OUTPATIENT)
Age: OVER 89
End: 2025-03-06
Attending: INTERNAL MEDICINE
Payer: MEDICARE

## 2025-03-06 VITALS
WEIGHT: 117.63 LBS | DIASTOLIC BLOOD PRESSURE: 66 MMHG | BODY MASS INDEX: 24.69 KG/M2 | SYSTOLIC BLOOD PRESSURE: 157 MMHG | TEMPERATURE: 98 F | OXYGEN SATURATION: 97 % | HEART RATE: 69 BPM | HEIGHT: 57.99 IN

## 2025-03-06 DIAGNOSIS — C51.9 SQUAMOUS CELL CARCINOMA OF VULVA (HCC): Primary | ICD-10-CM

## 2025-03-06 LAB
ALBUMIN SERPL-MCNC: 4.6 G/DL (ref 3.2–4.8)
ALBUMIN/GLOB SERPL: 1.9 {RATIO} (ref 1–2)
ALP LIVER SERPL-CCNC: 93 U/L
ALT SERPL-CCNC: 10 U/L
ANION GAP SERPL CALC-SCNC: 8 MMOL/L (ref 0–18)
AST SERPL-CCNC: 19 U/L (ref ?–34)
BASOPHILS # BLD AUTO: 0.03 X10(3) UL (ref 0–0.2)
BASOPHILS NFR BLD AUTO: 0.5 %
BILIRUB SERPL-MCNC: 0.4 MG/DL (ref 0.2–0.9)
BUN BLD-MCNC: 12 MG/DL (ref 9–23)
CALCIUM BLD-MCNC: 9.6 MG/DL (ref 8.7–10.6)
CHLORIDE SERPL-SCNC: 101 MMOL/L (ref 98–112)
CO2 SERPL-SCNC: 26 MMOL/L (ref 21–32)
CREAT BLD-MCNC: 0.8 MG/DL
EGFRCR SERPLBLD CKD-EPI 2021: 70 ML/MIN/1.73M2 (ref 60–?)
EOSINOPHIL # BLD AUTO: 0.09 X10(3) UL (ref 0–0.7)
EOSINOPHIL NFR BLD AUTO: 1.5 %
ERYTHROCYTE [DISTWIDTH] IN BLOOD BY AUTOMATED COUNT: 13.2 %
GLOBULIN PLAS-MCNC: 2.4 G/DL (ref 2–3.5)
GLUCOSE BLD-MCNC: 112 MG/DL (ref 70–99)
HCT VFR BLD AUTO: 37.1 %
HGB BLD-MCNC: 12.5 G/DL
IMM GRANULOCYTES # BLD AUTO: 0.03 X10(3) UL (ref 0–1)
IMM GRANULOCYTES NFR BLD: 0.5 %
LYMPHOCYTES # BLD AUTO: 1.62 X10(3) UL (ref 1–4)
LYMPHOCYTES NFR BLD AUTO: 26.1 %
MCH RBC QN AUTO: 29.1 PG (ref 26–34)
MCHC RBC AUTO-ENTMCNC: 33.7 G/DL (ref 31–37)
MCV RBC AUTO: 86.5 FL
MONOCYTES # BLD AUTO: 0.56 X10(3) UL (ref 0.1–1)
MONOCYTES NFR BLD AUTO: 9 %
NEUTROPHILS # BLD AUTO: 3.87 X10 (3) UL (ref 1.5–7.7)
NEUTROPHILS # BLD AUTO: 3.87 X10(3) UL (ref 1.5–7.7)
NEUTROPHILS NFR BLD AUTO: 62.4 %
OSMOLALITY SERPL CALC.SUM OF ELEC: 281 MOSM/KG (ref 275–295)
PLATELET # BLD AUTO: 269 10(3)UL (ref 150–450)
POTASSIUM SERPL-SCNC: 4.2 MMOL/L (ref 3.5–5.1)
PROT SERPL-MCNC: 7 G/DL (ref 5.7–8.2)
RBC # BLD AUTO: 4.29 X10(6)UL
SODIUM SERPL-SCNC: 135 MMOL/L (ref 136–145)
T4 FREE SERPL-MCNC: 1.3 NG/DL (ref 0.8–1.7)
TSI SER-ACNC: 2.16 UIU/ML (ref 0.55–4.78)
WBC # BLD AUTO: 6.2 X10(3) UL (ref 4–11)

## 2025-03-06 NOTE — PROGRESS NOTES
Patient here for follow-up and planned C2D1 treatment. States she had one day of fatigue where she slept most of the day. She had some diarrhea, but it has since resolved. Denies any additional updates or changes at this time.

## 2025-03-06 NOTE — PROGRESS NOTES
[facility] Note    Diagnosis: Vulvar Squamous cell carcinoma.     Treatment History:   Libtayo     Visit Diagnosis:  1. Squamous cell carcinoma of vulva (HCC)        History of Present Illness: 89F referred by Dr. Patel to discuss Vulvar Squamous cell carcinoma.     In June 2023, she met with gynecology for a vaginal bulge. No improvement with topical therapy. Met with Dr. Fonseca in 08/2023 and noted to have moderate-severe dysplasia  but surgery not done due to cardiac risk. Laser therapy recommended but not done. Symptoms worsened. Vulva biopsy from [facility] in 10/2024 showed invasive moderately differentiated squamous cell carcinoma. She met with Dr. Patel for a 2nd opinion and a radical vulvectomy with bilateral sentinel LN bx was discussed.  Patient herself does not want surgery.   CT AP from 12/2024 showed small sub-cm liver foci to small to characterize which is stable since 2019 per radiology. CXR unremarkable. Her case was discuss with surgical oncology and tumor board. Given the morbidity of the surgery and patient preference,  immunotherapy was recommended. No history of autoimmune disease. Baseline PET/CT from 12/19/24 showed increased FDG uptake in the vulva. We recommended to start start systemic but patient and family opted to wait.     Libtayo   -C1: 2/13/25   -C2: 3/6/25    Interval History  -C2 today  -Had mild fatigue but overall well tolerated  -1 day of diarrhea  -No rash or itching   -chronic bronchitis cough   -No vaginal bleeding or drainage       Review of Systems: 12 Point ROS was completed and pertinent positives are in the HPI    Medications Ordered Prior to Encounter[1]  Past Medical History:    Acute sinusitis    Arrhythmia    A-fib attacks 2003 and 2004    Asymptomatic microscopic hematuria    Since at least 2019 - seeing urologist 2022    Atrial fibrillation (HCC)    Benign paroxysmal vertigo    Breast lump    lower right breast    Bronchiectasis (HCC)     Cervical spondylosis    Cervical strain    Cervicalgia    Chronic cough    Diastolic dysfunction    Early cataract    Exudative age-related macular degeneration, left eye, with active choroidal neovascularization (HCC)    Hand fracture    third metacarpal phalangeal joint    Hard of hearing    Hearing impairment    NO HEARING AIDS    Heart disease    Heart murmur    on exam 2/1/22. Patient states she has a leaky valve    Herpes zoster    High blood pressure    High cholesterol    History of bone density study    Hx of motion sickness    IGT (impaired glucose tolerance)    Infection    right hand    Insomnia    Lipid screening    Liver lesion    Probably focal steatosis Unchanged 7 mos later, c/w hemangioma    Microscopic hematuria    Unremarkable cystoscopy 1/25/22 Dr. Fields. Renal US normal. CT urogram neg. 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 Left clitoral rocha. Dr. Rishabh Fonseca    Visual impairment    GLASSES    Vulvar cysts     Past Surgical History:   Procedure Laterality Date    Appendectomy  20 y/o    Colonoscopy  2007    diverticulosis    Colonoscopy  05/30/2013    Procedure: COLONOSCOPY;  Surgeon: Júnior Lawrence MD;  Location:  ENDOSCOPY    Correct bunion,simple      Cystourethroscopy  01/25/2022    Dr. Fields [facility] - unremarkable cystoscopy in office. H/o asymptomatic microscopic hematuria with h/o normal renal US. CT urogram ordered.    Foot/toes surgery proc unlisted  1990's    left    Tonsillectomy      Total knee replacement  2002/03    both    Vulvar  biopsy - jar(s): 6 Left 09/13/2023    VIN2-3 extending to biopsy margins. Left clitoral rocha. Dr. Rishabh Fonseca     Social History     Socioeconomic History    Marital status:    Tobacco Use    Smoking status: Never    Smokeless tobacco: Never   Vaping Use    Vaping status: Never Used   Substance and Sexual Activity    Alcohol use: Yes     Alcohol/week: 0.0 - 0.8 standard drinks of alcohol     Comment: occ    Drug use: No    Sexual activity: Not Currently      Family History   Problem Relation Age of Onset    Colon Cancer Father     Other (bladder cancer) Father 80    Other (Tobacco use) Sister     Other (Atrial fibrillation) Sister     Heart Disease Brother     Other (MI,) Brother 52    Breast Cancer Other         niece and herman    Other (DM) Other        Physical Exam  Height: 147.3 cm (4' 9.99\") (03/06 1247)  Weight: 53.3 kg (117 lb 9.6 oz) (03/06 1247)  BSA (Calculated - sq m): 1.45 sq meters (03/06 1247)  Pulse: 69 (03/06 1247)  BP: 157/66 (03/06 1247)  Temp: 97.9 °F (36.6 °C) (03/06 1247)  Do Not Use - Resp Rate: --  SpO2: 97 % (03/06 1247)    General: NAD, AOX3  HEENT: clear op, mmm, no jvd, no scleral icterus  CV: RR  Extremities: No edema   Lungs: no increased work of breathing  Abd: non distended   Neuro: CN: II-XII grossly intact      Results:  Lab Results   Component Value Date    WBC 6.7 02/13/2025    HGB 12.1 02/13/2025    HCT 36.4 02/13/2025    MCV 87.5 02/13/2025    .0 02/13/2025     Lab Results   Component Value Date     02/13/2025    K 4.4 02/13/2025    CO2 24.0 02/13/2025     02/13/2025    BUN 14 02/13/2025    ALB 4.3 02/13/2025       No results found for: \"LDH\"    Radiology: reviewed   PET/CT 12/2024  1. Increased FDG uptake involving the vulva, predominantly in the left with associated thickening suggestive of underlying lesion, though this is not well delineated on noncontrast CT.  This may be further evaluated with contrast enhanced MRI as   clinically indicated.    2. Focal uptake within the left 12th rib corresponding to a nondisplaced fracture.  Underlying pathologic fractures not entirely excluded, correlate clinically for recent trauma.  Otherwise no evidence of distant metastatic disease.   Please see above for further details.     Assessment and Plan:  Vulvar Squamous Cell Carcinoma  -Appears to be localized disease with negative imaging. Given morbidly of surgery, patient preference and her age she would like to consider immunotherapy. We discussed 1 year of immunotherapy and we can also consider consolidative RT. We discussed the risks and benefits of libtayo. We offered to start treatment in 12/2024 but patients opted to delay. Plan for C2 today. We can repeat imaging and clinical exam after 4 cycles.     A fib: on coumadin and digoxin    EDUARD Camp MD  [facility]         [1]   Current Outpatient Medications on File Prior to Visit   Medication Sig Dispense Refill    traMADol 50 MG Oral Tab Take 1 tablet (50 mg total) by mouth daily as needed for Pain. 20 tablet 0    Acetaminophen 325 MG Oral Cap Take by mouth.      clotrimazole 1 % External Cream Apply 1 each topically 2 (two) times daily as needed. 1 each 0    warfarin 5 MG Oral Tab TAKE ONE-HALF TO ONE TABLET DAILY AS DIRECTED BY ANTICOAGULATION CLINIC 80 tablet 3    Losartan Potassium 25 MG Oral Tab Take 1 tablet (25 mg total) by mouth daily. 90 tablet 3    atorvastatin 10 MG Oral Tab Take 1 tablet (10 mg total) by mouth nightly. 90 tablet 3    digoxin (DIGOX) 0.125 MG Oral Tab TAKE 1 TABLET(125 MCG) BY MOUTH EVERY DAY 90 tablet 3    METOPROLOL TARTRATE 50 MG Oral Tab TAKE ONE TABLET BY MOUTH TWICE DAILY. 180 tablet 3    prednisoLONE acetate 1 % Ophthalmic Suspension Place 1 drop into both eyes 2 (two) times daily.  1    Vitamin D3 2000 units Oral Cap Take 1 capsule (2,000 Units total) by mouth daily.      ketorolac tromethamine 0.5 % Ophthalmic Solution Place 1 drop into both eyes 2  (two) times daily. 6AM AND 6PM.      Meclizine HCl 12.5 MG Oral Tab Take 1 tablet (12.5 mg total) by mouth 3 (three) times daily as needed.      Dorzolamide HCl-Timolol Mal 22.3-6.8 MG/ML Ophthalmic Solution Place 1 drop into the right eye 2 (two) times daily.  2     Current Facility-Administered Medications on File Prior to Visit   Medication Dose Route Frequency Provider Last Rate Last Admin    [COMPLETED] cemiplimab-rwlc (Libtayo) 350 mg in sodium chloride 0.9% 107 mL infusion  350 mg Intravenous Once Thiago Camp MD   Stopped at 02/13/25 3335

## 2025-03-06 NOTE — PROGRESS NOTES
Pt here for C2D1 Drug(s)libtayo.  Arrives Ambulating independently, accompanied by Self     Patient was evaluated today by MD.    Oral medications included in this regimen:  no    Patient confirms comprehension of cancer treatment schedule:  yes    Pregnancy screening:  Not applicable    Modifications in dose or schedule:  No    Medications appearance and physical integrity checked by RN: yes.    Chemotherapy IV pump settings verified by 2 RNs:  Yes.  Frequency of blood return and site check throughout administration: Prior to administration     Infusion/treatment outcome:  patient tolerated treatment without incident    Education Record    Learner:  Patient  Barriers / Limitations:  None  Method:  Brief focused  Education / instructions given:    Outcome:  Shows understanding    Discharged Home, Ambulating independently, accompanied by:Self    Patient/family verbalized understanding of future appointments: by [employer] messaging

## 2025-03-23 ENCOUNTER — HOSPITAL ENCOUNTER (EMERGENCY)
Facility: HOSPITAL | Age: OVER 89
Discharge: HOME | End: 2025-03-23
Attending: EMERGENCY MEDICINE
Payer: MEDICARE

## 2025-03-23 ENCOUNTER — APPOINTMENT (OUTPATIENT)
Dept: CT IMAGING | Facility: HOSPITAL | Age: OVER 89
End: 2025-03-23
Attending: EMERGENCY MEDICINE
Payer: MEDICARE

## 2025-03-23 VITALS
RESPIRATION RATE: 18 BRPM | HEIGHT: 58 IN | WEIGHT: 120 LBS | DIASTOLIC BLOOD PRESSURE: 58 MMHG | TEMPERATURE: 97 F | OXYGEN SATURATION: 100 % | SYSTOLIC BLOOD PRESSURE: 146 MMHG | BODY MASS INDEX: 25.19 KG/M2 | HEART RATE: 50 BPM

## 2025-03-23 DIAGNOSIS — S09.90XA INJURY OF HEAD, INITIAL ENCOUNTER: ICD-10-CM

## 2025-03-23 DIAGNOSIS — S00.83XA CONTUSION OF FOREHEAD, INITIAL ENCOUNTER: Primary | ICD-10-CM

## 2025-03-23 PROCEDURE — 70450 CT HEAD/BRAIN W/O DYE: CPT | Performed by: EMERGENCY MEDICINE

## 2025-03-23 PROCEDURE — 99284 EMERGENCY DEPT VISIT MOD MDM: CPT

## 2025-03-23 NOTE — ED INITIAL ASSESSMENT (HPI)
Pt brought via EMS from home for fall. Pt states she was sitting in the chair felt herself getting sleepy and fell off the chair. Pt states she is currently receiving chemo for vulva CA. Per pt there was no LOC. There is a noted hematoma on the left side of forehead.

## 2025-03-23 NOTE — ED PROVIDER NOTES
Patient Seen in: [facility] Emergency Department      History     Chief Complaint   Patient presents with    Fall     Stated Complaint:     Subjective:   HPI      89-year-old female presents with a head injury.  She says she fell asleep in her chair and fell forward out of the chair and hit her head.  She denies any neck or back pain.  She reports some mild frontal head pain.  She is anticoagulated.  She has no other complaints at this time.  Denies nausea or vomiting.  No dizziness.  Denies vision changes.    Objective:     Past Medical History:    Acute sinusitis    Arrhythmia    A-fib attacks 2003 and 2004    Asymptomatic microscopic hematuria    Since at least 2019 - seeing urologist 2022    Atrial fibrillation (HCC)    Benign paroxysmal vertigo    Breast lump    lower right breast    Bronchiectasis (HCC)    Cervical spondylosis    Cervical strain    Cervicalgia    Chronic cough    Diastolic dysfunction    Early cataract    Exudative age-related macular degeneration, left eye, with active choroidal neovascularization (HCC)    Hand fracture    third metacarpal phalangeal joint    Hard of hearing    Hearing impairment    NO HEARING AIDS    Heart disease    Heart murmur    on exam 2/1/22. Patient states she has a leaky valve    Herpes zoster    High blood pressure    High cholesterol    History of bone density study    Hx of motion sickness    IGT (impaired glucose tolerance)    Infection    right hand    Insomnia    Lipid screening    Liver lesion    Probably focal steatosis Unchanged 7 mos later, c/w hemangioma    Microscopic hematuria    Unremarkable cystoscopy 1/25/22 Dr. Fields. Renal US normal. CT urogram neg. 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     Ocular migraine    Open-angle glaucoma of right eye    Osteoarthritis    Other and unspecified hyperlipidemia    PAF (paroxysmal atrial fibrillation) (HCC)    PONV (postoperative nausea and vomiting)    Prediabetes    DOESN'T CHECK BLOOD SUGARS AT HOME     Rectal bleed    Stress incontinence    Unspecified essential hypertension    Vertigo    episodic    CHULA III (vulvar intraepithelial neoplasia III)    VIN2-3 extending to biopsy margins. Left clitoral rocha. Dr. Rishabh Fonseca    Visual impairment    GLASSES    Vulvar cysts              Past Surgical History:   Procedure Laterality Date    Appendectomy  18 y/o    Colonoscopy  2007    diverticulosis    Colonoscopy  05/30/2013    Procedure: COLONOSCOPY;  Surgeon: Júnior Lawrence MD;  Location:  ENDOSCOPY    Correct bunion,simple      Cystourethroscopy  01/25/2022    Dr. Fields [facility] - unremarkable cystoscopy in office. H/o asymptomatic microscopic hematuria with h/o normal renal US. CT urogram ordered.    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 Left clitoral rocha. Dr. Rishabh Fonseca                Social History     Socioeconomic History    Marital status:    Tobacco Use    Smoking status: Never    Smokeless tobacco: Never   Vaping Use    Vaping status: Never Used   Substance and Sexual Activity    Alcohol use: Not Currently     Alcohol/week: 0.0 - 0.8 standard drinks of alcohol     Comment: occ    Drug use: No    Sexual activity: Not Currently   Other Topics Concern    Caffeine Concern Yes    Exercise Yes     Comment: some                  Physical Exam     ED Triage Vitals [03/23/25 1302]   BP (!) 171/71   Pulse 74   Resp 22   Temp 97.3 °F (36.3 °C)   Temp src Temporal   SpO2 100 %   O2 Device None (Room air)       Current Vitals:   Vital Signs  BP: 153/58  Pulse: (!) 46  Resp: 14  Temp: 97.3 °F (36.3 °C)  Temp src: Temporal  MAP (mmHg): 84    Oxygen Therapy  SpO2: 100 %  O2 Device: None (Room air)        Physical Exam  General:  Vitals as listed.    HEENT: No palpable skull fractures.  Hematoma to the left forehead region with an abrasion to the  area.  No Stevenson sign or hemotympanums.  Neck: No bony tenderness on palpation or percussion.  Full range of motion without discomfort.  Extremities: No evidence of traumatic injury to the extremities.  Neuro: Alert oriented and nonfocal   Skin: no rashes or nodules    ED Course   Labs Reviewed - No data to display         CT BRAIN OR HEAD (CPT=70450)    Result Date: 3/23/2025  PROCEDURE:  CT BRAIN OR HEAD (93116)  COMPARISON:  [facility] , CT, CT BRAIN OR HEAD (10164), 3/31/2021, 1:19 PM.  INDICATIONS:  head injury. anticoag  TECHNIQUE:  Noncontrast CT scanning is performed through the brain. Dose reduction techniques were used. Dose information is transmitted to the ACR (American College of Radiology) NRDR (National Radiology Data Registry) which includes the Dose Index Registry.  PATIENT STATED HISTORY: (As transcribed by Technologist)  Fall, laceration noted to upper left forehead region.    FINDINGS:  Ventricles and sulci are within normal limits.  Mild chronic small vessel ischemic disease.  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  There is no midline shift or mass-effect.  The basal cisterns are patent.  The gray-white matter differentiation is intact.  There is no acute intracranial hemorrhage or extra-axial fluid collection.   There is no evident fracture.   Severe opacification of the ethmoid sinus and sphenoid sinus.  Moderate opacification of the left frontal sinus.  Mild to moderate opacification of the left maxillary sinus.            CONCLUSION:  1. No acute intracranial hemorrhage or hydrocephalus. 2. Mild chronic small vessel ischemic disease with punctate chronic infarcts at the basal ganglia. If there is clinical concern for acute ischemia/infarction, an MRI of the brain would be recommended for further evaluation. 3. Paranasal sinus mucosal disease as above.  Clinical correlation is recommended to exclude sinusitis.    LOCATION:  [place]   Dictated by (CST): Stromberg, LeRoy, MD on 3/23/2025  at 3:06 PM     Finalized by (CST): Stromberg, LeRoy, MD on 3/23/2025 at 3:09 PM             MDM      89-year-old female presents after falling asleep in a chair and falling forward.  She has a hematoma to the left forehead area with abrasion to the area.  No neck or back pain.  No evidence of other injuries on examination.    Additional history obtained by EMS who reports that she was sent for further evaluation because she is on blood thinners    Differential includes but is not limited to contusion, hematoma, skull fracture, intracranial hemorrhage, a life/function threat.    CT head ordered for further evaluation.    My independent interpretation of CT of the head is that there is no obvious large acute intracranial hemorrhage.  Also reviewed radiology documentation which reports \"no acute intracranial hemorrhage or hydrocephalus\".    Okay for discharge home with contusion to the forehead.  She is otherwise well-appearing.  Explained results to patient and she is comfortable with plan for discharge home.            Medical Decision Making      Disposition and Plan     Clinical Impression:  1. Contusion of forehead, initial encounter    2. Injury of head, initial encounter         Disposition:  Discharge  3/23/2025  3:24 pm    Follow-up:  Roro Middleton MD  [address] [id number] [phone]    Schedule an appointment as soon as possible for a visit            Medications Prescribed:  Current Discharge Medication List              Supplementary Documentation:

## 2025-03-25 NOTE — PROGRESS NOTES
[facility] Note    Diagnosis: Vulvar Squamous cell carcinoma.     Treatment History:   Libtayo     Visit Diagnosis:  1. Squamous cell carcinoma of vulva (HCC)          History of Present Illness: 89F referred by Dr. Patel to discuss Vulvar Squamous cell carcinoma.     In June 2023, she met with gynecology for a vaginal bulge. No improvement with topical therapy. Met with Dr. Fonseca in 08/2023 and noted to have moderate-severe dysplasia  but surgery not done due to cardiac risk. Laser therapy recommended but not done. Symptoms worsened. Vulva biopsy from [facility] in 10/2024 showed invasive moderately differentiated squamous cell carcinoma. She met with Dr. aPtel for a 2nd opinion and a radical vulvectomy with bilateral sentinel LN bx was discussed.  Patient herself does not want surgery.   CT AP from 12/2024 showed small sub-cm liver foci to small to characterize which is stable since 2019 per radiology. CXR unremarkable. Her case was discuss with surgical oncology and tumor board. Given the morbidity of the surgery and patient preference,  immunotherapy was recommended. No history of autoimmune disease. Baseline PET/CT from 12/19/24 showed increased FDG uptake in the vulva. We recommended to start start systemic but patient and family opted to wait.     Libtayo   -C1: 2/13/25   -C2: 3/6/25   -C3: 3/27/25    Interval History  -C3 today  -She had a mechanical fall at home and hit her head. CT brain was normal.   -Energy is decent but has noticed some more fatigue   -NO cough or dyspnea  -No diarrhea       Review of Systems: 12 Point ROS was completed and pertinent positives are in the HPI    Medications Ordered Prior to Encounter[1]  Past Medical History:    Acute sinusitis    Arrhythmia    A-fib attacks 2003 and 2004    Asymptomatic microscopic hematuria    Since at least 2019 - seeing urologist 2022    Atrial fibrillation (HCC)    Benign paroxysmal vertigo    Breast lump    lower  right breast    Bronchiectasis (HCC)    Cervical spondylosis    Cervical strain    Cervicalgia    Chronic cough    Diastolic dysfunction    Early cataract    Exudative age-related macular degeneration, left eye, with active choroidal neovascularization (HCC)    Hand fracture    third metacarpal phalangeal joint    Hard of hearing    Hearing impairment    NO HEARING AIDS    Heart disease    Heart murmur    on exam 2/1/22. Patient states she has a leaky valve    Herpes zoster    High blood pressure    High cholesterol    History of bone density study    Hx of motion sickness    IGT (impaired glucose tolerance)    Infection    right hand    Insomnia    Lipid screening    Liver lesion    Probably focal steatosis Unchanged 7 mos later, c/w hemangioma    Microscopic hematuria    Unremarkable cystoscopy 1/25/22 Dr. Fields. Renal US normal. CT urogram neg. 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 Left clitoral rocha. Dr. Rishabh Fonseca    Visual impairment    GLASSES    Vulvar cysts     Past Surgical History:   Procedure Laterality Date    Appendectomy  18 y/o    Colonoscopy  2007    diverticulosis    Colonoscopy  05/30/2013    Procedure: COLONOSCOPY;  Surgeon: Júnior Lawrence MD;  Location:  ENDOSCOPY    Correct bunion,simple      Cystourethroscopy  01/25/2022    Dr. Fields [facility] - unremarkable cystoscopy in office. H/o asymptomatic microscopic hematuria with h/o normal renal US. CT urogram ordered.    Foot/toes surgery proc unlisted  1990's    left    Tonsillectomy      Total knee  replacement  2002/03    both    Vulvar biopsy - jar(s): 6 Left 09/13/2023    VIN2-3 extending to biopsy margins. Left clitoral rocha. Dr. Rishabh Fonseca     Social History     Socioeconomic History    Marital status:    Tobacco Use    Smoking status: Never    Smokeless tobacco: Never   Vaping Use    Vaping status: Never Used   Substance and Sexual Activity    Alcohol use: Not Currently     Alcohol/week: 0.0 - 0.8 standard drinks of alcohol     Comment: occ    Drug use: No    Sexual activity: Not Currently      Family History   Problem Relation Age of Onset    Colon Cancer Father     Other (bladder cancer) Father 80    Other (Tobacco use) Sister     Other (Atrial fibrillation) Sister     Heart Disease Brother     Other (MI,) Brother 52    Breast Cancer Other         niece and herman    Other (DM) Other        Physical Exam  Height: 147.3 cm (4' 9.99\") (03/27 0938)  Weight: 54 kg (119 lb) (03/27 0938)  BSA (Calculated - sq m): 1.46 sq meters (03/27 0938)  Pulse: 67 (03/27 0938)  BP: 146/63 (03/27 0938)  Temp: 97.7 °F (36.5 °C) (03/27 0938)  Do Not Use - Resp Rate: --  SpO2: 99 % (03/27 0938)    General: NAD, AOX3  HEENT: clear op, mmm, no jvd, no scleral icterus  CV: RR  Extremities: No edema   Lungs: no increased work of breathing  Abd: non distended   Neuro: CN: II-XII grossly intact      Results:  Lab Results   Component Value Date    WBC 6.2 03/06/2025    HGB 12.5 03/06/2025    HCT 37.1 03/06/2025    MCV 86.5 03/06/2025    .0 03/06/2025     Lab Results   Component Value Date     (L) 03/06/2025    K 4.2 03/06/2025    CO2 26.0 03/06/2025     03/06/2025    BUN 12 03/06/2025    ALB 4.6 03/06/2025       No results found for: \"LDH\"    Radiology: reviewed   PET/CT 12/2024  1. Increased FDG uptake involving the vulva, predominantly in the left with associated thickening suggestive of underlying lesion, though this is not well delineated on noncontrast CT.  This may be further evaluated with  contrast enhanced MRI as   clinically indicated.   2. Focal uptake within the left 12th rib corresponding to a nondisplaced fracture.  Underlying pathologic fractures not entirely excluded, correlate clinically for recent trauma.  Otherwise no evidence of distant metastatic disease.   Please see above for further details.     Assessment and Plan:  Vulvar Squamous Cell Carcinoma  -Appears to be localized disease with negative imaging. Given morbidly of surgery, patient preference and her age she would like to consider immunotherapy. We discussed 1 year of immunotherapy and we can also consider consolidative RT. We discussed the risks and benefits of libtayo. We offered to start treatment in 12/2024 but patients opted to delay. Plan for C3 today. We can repeat imaging and clinical exam after 4 cycles.     A fib: on coumadin and digoxin    EDUARD Camp MD  [facility]         [1]   Current Outpatient Medications on File Prior to Visit   Medication Sig Dispense Refill    traMADol 50 MG Oral Tab Take 1 tablet (50 mg total) by mouth daily as needed for Pain. 20 tablet 0    Acetaminophen 325 MG Oral Cap Take by mouth.      clotrimazole 1 % External Cream Apply 1 each topically 2 (two) times daily as needed. 1 each 0    warfarin 5 MG Oral Tab TAKE ONE-HALF TO ONE TABLET DAILY AS DIRECTED BY ANTICOAGULATION CLINIC 80 tablet 3    Losartan Potassium 25 MG Oral Tab Take 1 tablet (25 mg total) by mouth daily. 90 tablet 3    atorvastatin 10 MG Oral Tab Take 1 tablet (10 mg total) by mouth nightly. 90 tablet 3    digoxin (DIGOX) 0.125 MG Oral Tab TAKE 1 TABLET(125 MCG) BY MOUTH EVERY DAY 90 tablet 3    METOPROLOL TARTRATE 50 MG Oral Tab TAKE ONE TABLET BY MOUTH TWICE DAILY. 180 tablet 3    prednisoLONE acetate 1 % Ophthalmic Suspension Place 1 drop into both eyes 2 (two) times daily.  1    Vitamin D3 2000 units Oral Cap Take 1 capsule (2,000 Units total) by mouth daily.      ketorolac tromethamine 0.5 %  Ophthalmic Solution Place 1 drop into both eyes 2 (two) times daily. 6AM AND 6PM.      Meclizine HCl 12.5 MG Oral Tab Take 1 tablet (12.5 mg total) by mouth 3 (three) times daily as needed.      Dorzolamide HCl-Timolol Mal 22.3-6.8 MG/ML Ophthalmic Solution Place 1 drop into the right eye 2 (two) times daily.  2     Current Facility-Administered Medications on File Prior to Visit   Medication Dose Route Frequency Provider Last Rate Last Admin    [COMPLETED] cemiplimab-rwlc (Libtayo) 350 mg in sodium chloride 0.9% 107 mL infusion  350 mg Intravenous Once Thiago Camp MD   Stopped at 03/06/25 [id number]

## 2025-03-27 ENCOUNTER — OFFICE VISIT (OUTPATIENT)
Age: OVER 89
End: 2025-03-27
Attending: INTERNAL MEDICINE
Payer: MEDICARE

## 2025-03-27 VITALS
OXYGEN SATURATION: 99 % | DIASTOLIC BLOOD PRESSURE: 63 MMHG | WEIGHT: 119 LBS | BODY MASS INDEX: 24.98 KG/M2 | TEMPERATURE: 98 F | HEART RATE: 67 BPM | SYSTOLIC BLOOD PRESSURE: 146 MMHG | RESPIRATION RATE: 16 BRPM | HEIGHT: 57.99 IN

## 2025-03-27 DIAGNOSIS — C51.9 SQUAMOUS CELL CARCINOMA OF VULVA (HCC): Primary | ICD-10-CM

## 2025-03-27 DIAGNOSIS — C51.9 SQUAMOUS CELL CARCINOMA OF VULVA (HCC): ICD-10-CM

## 2025-03-27 LAB
ALBUMIN SERPL-MCNC: 4.5 G/DL (ref 3.2–4.8)
ALBUMIN/GLOB SERPL: 1.9 {RATIO} (ref 1–2)
ALP LIVER SERPL-CCNC: 82 U/L
ALT SERPL-CCNC: 14 U/L
ANION GAP SERPL CALC-SCNC: 11 MMOL/L (ref 0–18)
AST SERPL-CCNC: 24 U/L (ref ?–34)
BASOPHILS # BLD AUTO: 0.01 X10(3) UL (ref 0–0.2)
BASOPHILS NFR BLD AUTO: 0.2 %
BILIRUB SERPL-MCNC: 0.4 MG/DL (ref 0.2–0.9)
BUN BLD-MCNC: 12 MG/DL (ref 9–23)
CALCIUM BLD-MCNC: 9.4 MG/DL (ref 8.7–10.6)
CHLORIDE SERPL-SCNC: 105 MMOL/L (ref 98–112)
CO2 SERPL-SCNC: 22 MMOL/L (ref 21–32)
CREAT BLD-MCNC: 0.82 MG/DL
EGFRCR SERPLBLD CKD-EPI 2021: 68 ML/MIN/1.73M2 (ref 60–?)
EOSINOPHIL # BLD AUTO: 0.08 X10(3) UL (ref 0–0.7)
EOSINOPHIL NFR BLD AUTO: 1.4 %
ERYTHROCYTE [DISTWIDTH] IN BLOOD BY AUTOMATED COUNT: 13.2 %
GLOBULIN PLAS-MCNC: 2.4 G/DL (ref 2–3.5)
GLUCOSE BLD-MCNC: 97 MG/DL (ref 70–99)
HCT VFR BLD AUTO: 36.9 %
HGB BLD-MCNC: 12.3 G/DL
IMM GRANULOCYTES # BLD AUTO: 0.02 X10(3) UL (ref 0–1)
IMM GRANULOCYTES NFR BLD: 0.3 %
LYMPHOCYTES # BLD AUTO: 2 X10(3) UL (ref 1–4)
LYMPHOCYTES NFR BLD AUTO: 34.1 %
MCH RBC QN AUTO: 28.5 PG (ref 26–34)
MCHC RBC AUTO-ENTMCNC: 33.3 G/DL (ref 31–37)
MCV RBC AUTO: 85.4 FL
MONOCYTES # BLD AUTO: 0.46 X10(3) UL (ref 0.1–1)
MONOCYTES NFR BLD AUTO: 7.8 %
NEUTROPHILS # BLD AUTO: 3.29 X10 (3) UL (ref 1.5–7.7)
NEUTROPHILS # BLD AUTO: 3.29 X10(3) UL (ref 1.5–7.7)
NEUTROPHILS NFR BLD AUTO: 56.2 %
OSMOLALITY SERPL CALC.SUM OF ELEC: 286 MOSM/KG (ref 275–295)
PLATELET # BLD AUTO: 230 10(3)UL (ref 150–450)
POTASSIUM SERPL-SCNC: 4.3 MMOL/L (ref 3.5–5.1)
PROT SERPL-MCNC: 6.9 G/DL (ref 5.7–8.2)
RBC # BLD AUTO: 4.32 X10(6)UL
SODIUM SERPL-SCNC: 138 MMOL/L (ref 136–145)
T4 FREE SERPL-MCNC: 1.3 NG/DL (ref 0.8–1.7)
TSI SER-ACNC: 2.16 UIU/ML (ref 0.55–4.78)
WBC # BLD AUTO: 5.9 X10(3) UL (ref 4–11)

## 2025-03-27 NOTE — PROGRESS NOTES
Patient here for follow-up and planned C3D1 treatment. Recently fell and hit her head. She presented to the ER. Energy levels are low.

## 2025-03-27 NOTE — PROGRESS NOTES
Pt here for C3D1 Drug(s)Libtayo.  Arrives Ambulating with walker, accompanied by Self     Patient was evaluated today by MD.    Oral medications included in this regimen:  no    Patient confirms comprehension of cancer treatment schedule:  yes    Pregnancy screening:  Not applicable    Modifications in dose or schedule:  No    Medications appearance and physical integrity checked by RN: yes.    Chemotherapy IV pump settings verified by 2 RNs:  No due to targeted therapy IV administration.  Frequency of blood return and site check throughout administration: Prior to administration     Infusion/treatment outcome:  patient tolerated treatment without incident    Education Record    Learner:  Patient  Barriers / Limitations:  None  Method:  Brief focused  Education / instructions given:  poc  Outcome:  Shows understanding    Discharged Home, Ambulating with walker, accompanied by:Family member    Patient/family verbalized understanding of future appointments: by printed AVS     Pt discharged in stable condition.  Future appt made on 4/17.

## 2025-04-16 NOTE — PROGRESS NOTES
[facility] Note    Diagnosis: Vulvar Squamous cell carcinoma.     Treatment History:   Libtayo     Visit Diagnosis:  1. Malignant neoplasm of overlapping sites of vulva (HCC)    2. Vulvar cancer, carcinoma (HCC)    3. Hx of malignant neoplasm of vulva        History of Present Illness: 89F referred by Dr. Patel to discuss Vulvar Squamous cell carcinoma.     In June 2023, she met with gynecology for a vaginal bulge. No improvement with topical therapy. Met with Dr. Fonseca in 08/2023 and noted to have moderate-severe dysplasia  but surgery not done due to cardiac risk. Laser therapy recommended but not done. Symptoms worsened. Vulva biopsy from [facility] in 10/2024 showed invasive moderately differentiated squamous cell carcinoma. She met with Dr. Patel for a 2nd opinion and a radical vulvectomy with bilateral sentinel LN bx was discussed.  Patient herself does not want surgery.   CT AP from 12/2024 showed small sub-cm liver foci to small to characterize which is stable since 2019 per radiology. CXR unremarkable. Her case was discuss with surgical oncology and tumor board. Given the morbidity of the surgery and patient preference,  immunotherapy was recommended. No history of autoimmune disease. Baseline PET/CT from 12/19/24 showed increased FDG uptake in the vulva. We recommended to start start systemic but patient and family opted to wait.     Libtayo   -C1: 2/13/25   -C2: 3/6/25   -C3: 3/27/25   -C4: 4/17/25    Interval History  -C4 today  -Doing well. Has no new complaints. Energy level remains good.   -No more falls  -Occasionally fatigued   -No vaginal bleeding or drainage     Review of Systems: 12 Point ROS was completed and pertinent positives are in the HPI    Medications Ordered Prior to Encounter[1]  Past Medical History:    Acute sinusitis    Arrhythmia    A-fib attacks 2003 and 2004    Asymptomatic microscopic hematuria    Since at least 2019 - seeing urologist 2022     Atrial fibrillation (HCC)    Benign paroxysmal vertigo    Breast lump    lower right breast    Bronchiectasis (HCC)    Cervical spondylosis    Cervical strain    Cervicalgia    Chronic cough    Diastolic dysfunction    Early cataract    Exudative age-related macular degeneration, left eye, with active choroidal neovascularization (HCC)    Hand fracture    third metacarpal phalangeal joint    Hard of hearing    Hearing impairment    NO HEARING AIDS    Heart disease    Heart murmur    on exam 2/1/22. Patient states she has a leaky valve    Herpes zoster    High blood pressure    High cholesterol    History of bone density study    Hx of motion sickness    IGT (impaired glucose tolerance)    Infection    right hand    Insomnia    Lipid screening    Liver lesion    Probably focal steatosis Unchanged 7 mos later, c/w hemangioma    Microscopic hematuria    Unremarkable cystoscopy 1/25/22 Dr. Fields. Renal US normal. CT urogram neg. 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 Left clitoral rocha. Dr. Rishabh Fonseca    Visual impairment    GLASSES    Vulvar cysts     Past Surgical History:   Procedure Laterality Date    Appendectomy  20 y/o    Colonoscopy  2007    diverticulosis    Colonoscopy  05/30/2013    Procedure: COLONOSCOPY;  Surgeon: Júnior Lawrence MD;  Location:  ENDOSCOPY    Correct bunion,simple      Cystourethroscopy  01/25/2022    Dr. Fields [facility] - unremarkable cystoscopy in office. H/o asymptomatic microscopic hematuria with h/o normal renal US. CT urogram ordered.     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 Left clitoral rocha. Dr. Rishabh Fonseca     Social History     Socioeconomic History    Marital status:    Tobacco Use    Smoking status: Never    Smokeless tobacco: Never   Vaping Use    Vaping status: Never Used   Substance and Sexual Activity    Alcohol use: Not Currently     Alcohol/week: 0.0 - 0.8 standard drinks of alcohol     Comment: occ    Drug use: No    Sexual activity: Not Currently      Family History   Problem Relation Age of Onset    Colon Cancer Father     Other (bladder cancer) Father 80    Other (Tobacco use) Sister     Other (Atrial fibrillation) Sister     Heart Disease Brother     Other (MI,) Brother 52    Breast Cancer Other         niece and herman    Other (DM) Other        Physical Exam  Height: 147.3 cm (4' 9.99\") (04/17 1304)  Weight: 53.1 kg (117 lb) (04/17 1304)  BSA (Calculated - sq m): 1.45 sq meters (04/17 1304)  Pulse: 67 (04/17 1304)  BP: 138/68 (04/17 1304)  Temp: 97.7 °F (36.5 °C) (04/17 1304)  Do Not Use - Resp Rate: --  SpO2: 98 % (04/17 1304)    General: NAD, AOX3  HEENT: clear op, mmm, no jvd, no scleral icterus  CV: RR  Extremities: No edema   Lungs: no increased work of breathing  Abd: non distended   Neuro: CN: II-XII grossly intact      Results:  Lab Results   Component Value Date    WBC 6.5 04/17/2025    HGB 12.2 04/17/2025    HCT 37.4 04/17/2025    MCV 86.8 04/17/2025    .0 04/17/2025     Lab Results   Component Value Date     03/27/2025    K 4.3 03/27/2025    CO2 22.0 03/27/2025     03/27/2025    BUN 12 03/27/2025    ALB 4.5 03/27/2025       No results found for: \"LDH\"    Radiology: reviewed   PET/CT 12/2024  1. Increased FDG uptake involving the vulva, predominantly in the left with associated thickening suggestive of underlying lesion, though this is  not well delineated on noncontrast CT.  This may be further evaluated with contrast enhanced MRI as   clinically indicated.   2. Focal uptake within the left 12th rib corresponding to a nondisplaced fracture.  Underlying pathologic fractures not entirely excluded, correlate clinically for recent trauma.  Otherwise no evidence of distant metastatic disease.   Please see above for further details.     Assessment and Plan:  Vulvar Squamous Cell Carcinoma  -Appears to be localized disease with negative imaging. Given morbidly of surgery, patient preference and her age she would like to consider immunotherapy. We discussed 1 year of immunotherapy and we can also consider consolidative RT. We discussed the risks and benefits of libtayo. We offered to start treatment in 12/2024 but patients opted to delay. Plan for C4 today. We can repeat imaging and clinical exam after 4 cycles.     A fib: on coumadin and digoxin    EDUARD Camp MD  [facility]         [1]   Current Outpatient Medications on File Prior to Visit   Medication Sig Dispense Refill    traMADol 50 MG Oral Tab Take 1 tablet (50 mg total) by mouth daily as needed for Pain. 20 tablet 0    Acetaminophen 325 MG Oral Cap Take by mouth.      clotrimazole 1 % External Cream Apply 1 each topically 2 (two) times daily as needed. 1 each 0    warfarin 5 MG Oral Tab TAKE ONE-HALF TO ONE TABLET DAILY AS DIRECTED BY ANTICOAGULATION CLINIC 80 tablet 3    Losartan Potassium 25 MG Oral Tab Take 1 tablet (25 mg total) by mouth daily. 90 tablet 3    atorvastatin 10 MG Oral Tab Take 1 tablet (10 mg total) by mouth nightly. 90 tablet 3    digoxin (DIGOX) 0.125 MG Oral Tab TAKE 1 TABLET(125 MCG) BY MOUTH EVERY DAY 90 tablet 3    METOPROLOL TARTRATE 50 MG Oral Tab TAKE ONE TABLET BY MOUTH TWICE DAILY. 180 tablet 3    prednisoLONE acetate 1 % Ophthalmic Suspension Place 1 drop into both eyes in the morning and 1 drop before bedtime.  1    Vitamin D3 2000 units  Oral Cap Take 1 capsule (2,000 Units total) by mouth in the morning.      ketorolac tromethamine 0.5 % Ophthalmic Solution Place 1 drop into both eyes in the morning and 1 drop before bedtime. 6AM AND 6PM. .      Meclizine HCl 12.5 MG Oral Tab Take 1 tablet (12.5 mg total) by mouth as needed in the morning and 1 tablet (12.5 mg total) as needed at noon and 1 tablet (12.5 mg total) as needed in the evening.      Dorzolamide HCl-Timolol Mal 22.3-6.8 MG/ML Ophthalmic Solution Place 1 drop into the right eye in the morning and 1 drop before bedtime.  2     Current Facility-Administered Medications on File Prior to Visit   Medication Dose Route Frequency Provider Last Rate Last Admin    [COMPLETED] cemiplimab-rwlc (Libtayo) 350 mg in sodium chloride 0.9% 107 mL infusion  350 mg Intravenous Once Thiago Camp MD   Stopped at 03/27/25 1126

## 2025-04-17 ENCOUNTER — NURSE ONLY (OUTPATIENT)
Age: OVER 89
End: 2025-04-17
Attending: INTERNAL MEDICINE
Payer: MEDICARE

## 2025-04-17 ENCOUNTER — OFFICE VISIT (OUTPATIENT)
Age: OVER 89
End: 2025-04-17
Attending: INTERNAL MEDICINE
Payer: MEDICARE

## 2025-04-17 VITALS
TEMPERATURE: 98 F | BODY MASS INDEX: 24.56 KG/M2 | DIASTOLIC BLOOD PRESSURE: 68 MMHG | WEIGHT: 117 LBS | HEIGHT: 57.99 IN | RESPIRATION RATE: 16 BRPM | HEART RATE: 67 BPM | OXYGEN SATURATION: 98 % | SYSTOLIC BLOOD PRESSURE: 138 MMHG

## 2025-04-17 VITALS — SYSTOLIC BLOOD PRESSURE: 138 MMHG | HEART RATE: 67 BPM | DIASTOLIC BLOOD PRESSURE: 68 MMHG

## 2025-04-17 DIAGNOSIS — C51.9 SQUAMOUS CELL CARCINOMA OF VULVA (HCC): ICD-10-CM

## 2025-04-17 DIAGNOSIS — C51.8 MALIGNANT NEOPLASM OF OVERLAPPING SITES OF VULVA (HCC): Primary | ICD-10-CM

## 2025-04-17 DIAGNOSIS — C51.9 SQUAMOUS CELL CARCINOMA OF VULVA (HCC): Primary | ICD-10-CM

## 2025-04-17 DIAGNOSIS — Z85.44 HX OF MALIGNANT NEOPLASM OF VULVA: ICD-10-CM

## 2025-04-17 DIAGNOSIS — C51.9 VULVAR CANCER, CARCINOMA (HCC): ICD-10-CM

## 2025-04-17 LAB
ALBUMIN SERPL-MCNC: 4.5 G/DL (ref 3.2–4.8)
ALBUMIN/GLOB SERPL: 1.9 {RATIO} (ref 1–2)
ALP LIVER SERPL-CCNC: 84 U/L (ref 55–142)
ALT SERPL-CCNC: 11 U/L (ref 10–49)
ANION GAP SERPL CALC-SCNC: 8 MMOL/L (ref 0–18)
AST SERPL-CCNC: 19 U/L (ref ?–34)
BASOPHILS # BLD AUTO: 0.02 X10(3) UL (ref 0–0.2)
BASOPHILS NFR BLD AUTO: 0.3 %
BILIRUB SERPL-MCNC: 0.5 MG/DL (ref 0.2–0.9)
BUN BLD-MCNC: 12 MG/DL (ref 9–23)
CALCIUM BLD-MCNC: 9.5 MG/DL (ref 8.7–10.6)
CHLORIDE SERPL-SCNC: 104 MMOL/L (ref 98–112)
CO2 SERPL-SCNC: 26 MMOL/L (ref 21–32)
CREAT BLD-MCNC: 0.85 MG/DL (ref 0.55–1.02)
EGFRCR SERPLBLD CKD-EPI 2021: 65 ML/MIN/1.73M2 (ref 60–?)
EOSINOPHIL # BLD AUTO: 0.12 X10(3) UL (ref 0–0.7)
EOSINOPHIL NFR BLD AUTO: 1.8 %
ERYTHROCYTE [DISTWIDTH] IN BLOOD BY AUTOMATED COUNT: 13.5 %
FASTING STATUS PATIENT QL REPORTED: NO
GLOBULIN PLAS-MCNC: 2.4 G/DL (ref 2–3.5)
GLUCOSE BLD-MCNC: 76 MG/DL (ref 70–99)
HCT VFR BLD AUTO: 37.4 % (ref 35–48)
HGB BLD-MCNC: 12.2 G/DL (ref 12–16)
IMM GRANULOCYTES # BLD AUTO: 0.02 X10(3) UL (ref 0–1)
IMM GRANULOCYTES NFR BLD: 0.3 %
LYMPHOCYTES # BLD AUTO: 1.77 X10(3) UL (ref 1–4)
LYMPHOCYTES NFR BLD AUTO: 27.1 %
MCH RBC QN AUTO: 28.3 PG (ref 26–34)
MCHC RBC AUTO-ENTMCNC: 32.6 G/DL (ref 31–37)
MCV RBC AUTO: 86.8 FL (ref 80–100)
MONOCYTES # BLD AUTO: 0.63 X10(3) UL (ref 0.1–1)
MONOCYTES NFR BLD AUTO: 9.7 %
NEUTROPHILS # BLD AUTO: 3.96 X10 (3) UL (ref 1.5–7.7)
NEUTROPHILS # BLD AUTO: 3.96 X10(3) UL (ref 1.5–7.7)
NEUTROPHILS NFR BLD AUTO: 60.8 %
OSMOLALITY SERPL CALC.SUM OF ELEC: 285 MOSM/KG (ref 275–295)
PLATELET # BLD AUTO: 269 10(3)UL (ref 150–450)
POTASSIUM SERPL-SCNC: 4.5 MMOL/L (ref 3.5–5.1)
PROT SERPL-MCNC: 6.9 G/DL (ref 5.7–8.2)
RBC # BLD AUTO: 4.31 X10(6)UL (ref 3.8–5.3)
SODIUM SERPL-SCNC: 138 MMOL/L (ref 136–145)
T4 FREE SERPL-MCNC: 1.2 NG/DL (ref 0.8–1.7)
TSI SER-ACNC: 1.85 UIU/ML (ref 0.55–4.78)
WBC # BLD AUTO: 6.5 X10(3) UL (ref 4–11)

## 2025-04-17 NOTE — PROGRESS NOTES
Patient here for follow-up and planned C4D1 treatment. She denies any updates or changes in symptoms since last visit. She would like to discuss repeat imaging.

## 2025-04-17 NOTE — PROGRESS NOTES
Pt here for C4D1 Drug(s)libtayo.  Arrives Ambulating with walker, accompanied by Self     Patient was evaluated today by MD.    Oral medications included in this regimen:  no    Patient confirms comprehension of cancer treatment schedule:  yes    Pregnancy screening:  Not applicable    Modifications in dose or schedule:  No    Medications appearance and physical integrity checked by RN: yes.    Chemotherapy IV pump settings verified by 2 RNs:  No due to targeted therapy IV administration.  Frequency of blood return and site check throughout administration: Prior to administration     Infusion/treatment outcome:  patient tolerated treatment without incident    Education Record    Learner:  Patient  Barriers / Limitations:  None  Method:  Brief focused  Education / instructions given:  poc  Outcome:  Shows understanding    Discharged Home, Ambulating with walker, accompanied by:Self    Patient/family verbalized understanding of future appointments: by printed AVS    Pt discharged in stable condition. Pt to rtc in 3 wks for next tx.

## 2025-05-01 ENCOUNTER — HOSPITAL ENCOUNTER (OUTPATIENT)
Dept: NUCLEAR MEDICINE | Facility: HOSPITAL | Age: OVER 89
Discharge: HOME | End: 2025-05-01
Attending: INTERNAL MEDICINE
Payer: MEDICARE

## 2025-05-01 DIAGNOSIS — C51.9 VULVAR CANCER, CARCINOMA (HCC): ICD-10-CM

## 2025-05-01 DIAGNOSIS — C51.8 MALIGNANT NEOPLASM OF OVERLAPPING SITES OF VULVA (HCC): ICD-10-CM

## 2025-05-01 LAB — GLUCOSE BLD-MCNC: 116 MG/DL (ref 70–99)

## 2025-05-01 PROCEDURE — 82962 GLUCOSE BLOOD TEST: CPT

## 2025-05-01 PROCEDURE — 78815 PET IMAGE W/CT SKULL-THIGH: CPT | Performed by: INTERNAL MEDICINE

## 2025-05-07 NOTE — PROGRESS NOTES
[facility] Note    Diagnosis: Vulvar Squamous cell carcinoma.     Treatment History:   Libtayo     Visit Diagnosis:  1. Urinary frequency    2. Squamous cell carcinoma of vulva (HCC)      History of Present Illness: 89F referred by Dr. Patel to discuss Vulvar Squamous cell carcinoma.     In June 2023, she met with gynecology for a vaginal bulge. No improvement with topical therapy. Met with Dr. Fonseca in 08/2023 and noted to have moderate-severe dysplasia  but surgery not done due to cardiac risk. Laser therapy recommended but not done. Symptoms worsened. Vulva biopsy from [facility] in 10/2024 showed invasive moderately differentiated squamous cell carcinoma. She met with Dr. Patel for a 2nd opinion and a radical vulvectomy with bilateral sentinel LN bx was discussed.  Patient herself does not want surgery.   CT AP from 12/2024 showed small sub-cm liver foci to small to characterize which is stable since 2019 per radiology. CXR unremarkable. Her case was discuss with surgical oncology and tumor board. Given the morbidity of the surgery and patient preference,  immunotherapy was recommended. No history of autoimmune disease. Baseline PET/CT from 12/19/24 showed increased FDG uptake in the vulva. We recommended to start start systemic but patient and family opted to wait.     Libtayo   -C1: 2/13/25   -C2: 3/6/25   -C3: 3/27/25   -C4: 4/17/25   -PET/CT 5/1/25-Vulvar mass appears the same. However, there appears to be new inguinal LAD. WE discussed her case in tumor board and the recommendation is to proceed with palliative RT.     Interval History  -Reviewed PET and tumor board discussion  -Having more urinary frequency   -No cough, dyspnea  -Denies any new pain          Review of Systems: 12 Point ROS was completed and pertinent positives are in the HPI    Medications Ordered Prior to Encounter[1]  Past Medical History:    Acute sinusitis    Arrhythmia    A-fib attacks 2003 and 2004     Asymptomatic microscopic hematuria    Since at least 2019 - seeing urologist 2022    Atrial fibrillation (HCC)    Benign paroxysmal vertigo    Breast lump    lower right breast    Bronchiectasis (HCC)    Cervical spondylosis    Cervical strain    Cervicalgia    Chronic cough    Diastolic dysfunction    Early cataract    Exudative age-related macular degeneration, left eye, with active choroidal neovascularization (HCC)    Hand fracture    third metacarpal phalangeal joint    Hard of hearing    Hearing impairment    NO HEARING AIDS    Heart disease    Heart murmur    on exam 2/1/22. Patient states she has a leaky valve    Herpes zoster    High blood pressure    High cholesterol    History of bone density study    Hx of motion sickness    IGT (impaired glucose tolerance)    Infection    right hand    Insomnia    Lipid screening    Liver lesion    Probably focal steatosis Unchanged 7 mos later, c/w hemangioma    Microscopic hematuria    Unremarkable cystoscopy 1/25/22 Dr. Fields. Renal US normal. CT urogram neg. 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 Left clitoral rocha. Dr. Rishabh Fonseca    Visual impairment    GLASSES    Vulvar cysts     Past Surgical History:   Procedure Laterality Date    Appendectomy  20 y/o    Colonoscopy  2007    diverticulosis    Colonoscopy  05/30/2013    Procedure: COLONOSCOPY;  Surgeon: Júnior Lawrence MD;  Location:  ENDOSCOPY    Correct bunion,simple      Cystourethroscopy  01/25/2022    Dr. [facility] - unremarkable cystoscopy in office. H/o  asymptomatic microscopic hematuria with h/o normal renal US. CT urogram ordered.    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 Left clitoral rocha. Dr. Rishabh Fonseca     Social History     Socioeconomic History    Marital status:    Tobacco Use    Smoking status: Never    Smokeless tobacco: Never   Vaping Use    Vaping status: Never Used   Substance and Sexual Activity    Alcohol use: Not Currently     Alcohol/week: 0.0 - 0.8 standard drinks of alcohol     Comment: occ    Drug use: No    Sexual activity: Not Currently      Family History   Problem Relation Age of Onset    Colon Cancer Father     Other (bladder cancer) Father 80    Other (Tobacco use) Sister     Other (Atrial fibrillation) Sister     Heart Disease Brother     Other (MI,) Brother 52    Breast Cancer Other         niece and herman    Other (DM) Other        Physical Exam  Height: 147.3 cm (4' 9.99\") (05/08 1307)  Weight: 54.4 kg (120 lb) (05/08 1307)  BSA (Calculated - sq m): 1.47 sq meters (05/08 1307)  Pulse: 69 (05/08 1307)  BP: 144/70 (05/08 1307)  Temp: 97.9 °F (36.6 °C) (05/08 1307)  Do Not Use - Resp Rate: --  SpO2: 98 % (05/08 1307)    General: NAD, AOX3  HEENT: clear op, mmm, no jvd, no scleral icterus  CV: RR  Extremities: No edema   Lungs: no increased work of breathing  Abd: non distended   Neuro: CN: II-XII grossly intact      Results:  Lab Results   Component Value Date    WBC 6.2 05/08/2025    HGB 12.9 05/08/2025    HCT 38.6 05/08/2025    MCV 86.9 05/08/2025    .0 05/08/2025     Lab Results   Component Value Date     (L) 05/08/2025    K 4.7 05/08/2025    CO2 27.0 05/08/2025     05/08/2025    BUN 13 05/08/2025    ALB 4.7 05/08/2025       No results found for: \"LDH\"    Radiology: reviewed   PET/CT 5/1/25  1. Within the midline of the labial folds is an ovoid soft  tissue density with a max SUV of 16.9 measuring 2.6 x 1.6 cm.  This is consistent with the patient's known history of vulvar cancer.   2. Two foci of lymphadenopathy, 1 within the right inguinal region with a max SUV of 12.6 and the other within the left inguinal region with a max SUV of 12.3.   3. No evidence of distant metastatic disease.     PET/CT 12/2024  1. Increased FDG uptake involving the vulva, predominantly in the left with associated thickening suggestive of underlying lesion, though this is not well delineated on noncontrast CT.  This may be further evaluated with contrast enhanced MRI as   clinically indicated.   2. Focal uptake within the left 12th rib corresponding to a nondisplaced fracture.  Underlying pathologic fractures not entirely excluded, correlate clinically for recent trauma.  Otherwise no evidence of distant metastatic disease.   Please see above for further details.     Assessment and Plan:  Vulvar Squamous Cell Carcinoma  -Appears to be localized disease with negative imaging. Given morbidly of surgery, patient preference and her age she would like to consider immunotherapy. After 4 cycles of immunotherapy, her mass appears unchanged however there is new bilateral inguinal LAD. WE reviewed her case in tumor board and the recommendation is to proceed with palliative RT. I discussed that RT is reasonable since this will likely delay or prevent any future pain.     Urinary Frequency: UA ordered     A fib: on coumadin and digoxin    M. Mike Camp MD  [facility]         [1]   Current Outpatient Medications on File Prior to Visit   Medication Sig Dispense Refill    traMADol 50 MG Oral Tab Take 1 tablet (50 mg total) by mouth daily as needed for Pain. 20 tablet 0    Acetaminophen 325 MG Oral Cap Take by mouth.      clotrimazole 1 % External Cream Apply 1 each topically 2 (two) times daily as needed. 1 each 0    warfarin 5 MG Oral Tab TAKE ONE-HALF TO ONE TABLET DAILY  AS DIRECTED BY ANTICOAGULATION CLINIC 80 tablet 3    Losartan Potassium 25 MG Oral Tab Take 1 tablet (25 mg total) by mouth daily. 90 tablet 3    atorvastatin 10 MG Oral Tab Take 1 tablet (10 mg total) by mouth nightly. 90 tablet 3    digoxin (DIGOX) 0.125 MG Oral Tab TAKE 1 TABLET(125 MCG) BY MOUTH EVERY DAY 90 tablet 3    METOPROLOL TARTRATE 50 MG Oral Tab TAKE ONE TABLET BY MOUTH TWICE DAILY. 180 tablet 3    prednisoLONE acetate 1 % Ophthalmic Suspension Place 1 drop into both eyes in the morning and 1 drop before bedtime.  1    Vitamin D3 2000 units Oral Cap Take 1 capsule (2,000 Units total) by mouth in the morning.      ketorolac tromethamine 0.5 % Ophthalmic Solution Place 1 drop into both eyes in the morning and 1 drop before bedtime. 6AM AND 6PM. .      Meclizine HCl 12.5 MG Oral Tab Take 1 tablet (12.5 mg total) by mouth as needed in the morning and 1 tablet (12.5 mg total) as needed at noon and 1 tablet (12.5 mg total) as needed in the evening.      Dorzolamide HCl-Timolol Mal 22.3-6.8 MG/ML Ophthalmic Solution Place 1 drop into the right eye in the morning and 1 drop before bedtime.  2     Current Facility-Administered Medications on File Prior to Visit   Medication Dose Route Frequency Provider Last Rate Last Admin    [COMPLETED] cemiplimab-rwlc (Libtayo) 350 mg in sodium chloride 0.9% 107 mL infusion  350 mg Intravenous Once Thaigo Camp MD   Stopped at 04/17/25 4253

## 2025-05-08 ENCOUNTER — OFFICE VISIT (OUTPATIENT)
Age: OVER 89
End: 2025-05-08
Attending: INTERNAL MEDICINE
Payer: MEDICARE

## 2025-05-08 ENCOUNTER — NURSE ONLY (OUTPATIENT)
Age: OVER 89
End: 2025-05-08
Attending: INTERNAL MEDICINE
Payer: MEDICARE

## 2025-05-08 VITALS
BODY MASS INDEX: 25.19 KG/M2 | OXYGEN SATURATION: 98 % | HEIGHT: 57.99 IN | SYSTOLIC BLOOD PRESSURE: 144 MMHG | RESPIRATION RATE: 16 BRPM | DIASTOLIC BLOOD PRESSURE: 70 MMHG | HEART RATE: 69 BPM | TEMPERATURE: 98 F | WEIGHT: 120 LBS

## 2025-05-08 DIAGNOSIS — C51.9 SQUAMOUS CELL CARCINOMA OF VULVA (HCC): ICD-10-CM

## 2025-05-08 DIAGNOSIS — R35.0 URINARY FREQUENCY: Primary | ICD-10-CM

## 2025-05-08 DIAGNOSIS — C51.9 SQUAMOUS CELL CARCINOMA OF VULVA (HCC): Primary | ICD-10-CM

## 2025-05-08 LAB
ALBUMIN SERPL-MCNC: 4.7 G/DL (ref 3.2–4.8)
ALBUMIN/GLOB SERPL: 1.9 {RATIO} (ref 1–2)
ALP LIVER SERPL-CCNC: 88 U/L (ref 55–142)
ALT SERPL-CCNC: 11 U/L (ref 10–49)
ANION GAP SERPL CALC-SCNC: 5 MMOL/L (ref 0–18)
AST SERPL-CCNC: 23 U/L (ref ?–34)
BASOPHILS # BLD AUTO: 0.02 X10(3) UL (ref 0–0.2)
BASOPHILS NFR BLD AUTO: 0.3 %
BILIRUB SERPL-MCNC: 0.5 MG/DL (ref 0.2–0.9)
BUN BLD-MCNC: 13 MG/DL (ref 9–23)
CALCIUM BLD-MCNC: 9.8 MG/DL (ref 8.7–10.6)
CHLORIDE SERPL-SCNC: 102 MMOL/L (ref 98–112)
CO2 SERPL-SCNC: 27 MMOL/L (ref 21–32)
CREAT BLD-MCNC: 0.77 MG/DL (ref 0.55–1.02)
EGFRCR SERPLBLD CKD-EPI 2021: 73 ML/MIN/1.73M2 (ref 60–?)
EOSINOPHIL # BLD AUTO: 0.11 X10(3) UL (ref 0–0.7)
EOSINOPHIL NFR BLD AUTO: 1.8 %
ERYTHROCYTE [DISTWIDTH] IN BLOOD BY AUTOMATED COUNT: 13.6 %
FASTING STATUS PATIENT QL REPORTED: NO
GLOBULIN PLAS-MCNC: 2.5 G/DL (ref 2–3.5)
GLUCOSE BLD-MCNC: 92 MG/DL (ref 70–99)
HCT VFR BLD AUTO: 38.6 % (ref 35–48)
HGB BLD-MCNC: 12.9 G/DL (ref 12–16)
IMM GRANULOCYTES # BLD AUTO: 0.03 X10(3) UL (ref 0–1)
IMM GRANULOCYTES NFR BLD: 0.5 %
LYMPHOCYTES # BLD AUTO: 1.56 X10(3) UL (ref 1–4)
LYMPHOCYTES NFR BLD AUTO: 25.2 %
MCH RBC QN AUTO: 29.1 PG (ref 26–34)
MCHC RBC AUTO-ENTMCNC: 33.4 G/DL (ref 31–37)
MCV RBC AUTO: 86.9 FL (ref 80–100)
MONOCYTES # BLD AUTO: 0.64 X10(3) UL (ref 0.1–1)
MONOCYTES NFR BLD AUTO: 10.4 %
NEUTROPHILS # BLD AUTO: 3.82 X10 (3) UL (ref 1.5–7.7)
NEUTROPHILS # BLD AUTO: 3.82 X10(3) UL (ref 1.5–7.7)
NEUTROPHILS NFR BLD AUTO: 61.8 %
OSMOLALITY SERPL CALC.SUM OF ELEC: 278 MOSM/KG (ref 275–295)
PLATELET # BLD AUTO: 283 10(3)UL (ref 150–450)
POTASSIUM SERPL-SCNC: 4.7 MMOL/L (ref 3.5–5.1)
PROT SERPL-MCNC: 7.2 G/DL (ref 5.7–8.2)
RBC # BLD AUTO: 4.44 X10(6)UL (ref 3.8–5.3)
SODIUM SERPL-SCNC: 134 MMOL/L (ref 136–145)
T4 FREE SERPL-MCNC: 1.3 NG/DL (ref 0.8–1.7)
TSI SER-ACNC: 2.7 UIU/ML (ref 0.55–4.78)
WBC # BLD AUTO: 6.2 X10(3) UL (ref 4–11)

## 2025-05-08 NOTE — PROGRESS NOTES
Patient here for follow-up and possible treatment. She denies any updates or changes since last visit. Would like to discuss PET results and next steps.

## 2025-05-09 ENCOUNTER — LAB ENCOUNTER (OUTPATIENT)
Dept: LAB | Age: OVER 89
End: 2025-05-09
Attending: INTERNAL MEDICINE
Payer: MEDICARE

## 2025-05-09 DIAGNOSIS — R35.0 URINARY FREQUENCY: ICD-10-CM

## 2025-05-09 DIAGNOSIS — C51.9 SQUAMOUS CELL CARCINOMA OF VULVA (HCC): ICD-10-CM

## 2025-05-09 LAB
BILIRUB UR QL STRIP.AUTO: NEGATIVE
CLARITY UR REFRACT.AUTO: CLEAR
GLUCOSE UR STRIP.AUTO-MCNC: NORMAL MG/DL
KETONES UR STRIP.AUTO-MCNC: NEGATIVE MG/DL
LEUKOCYTE ESTERASE UR QL STRIP.AUTO: 500
NITRITE UR QL STRIP.AUTO: NEGATIVE
PH UR STRIP.AUTO: 5.5 [PH] (ref 5–8)
PROT UR STRIP.AUTO-MCNC: NEGATIVE MG/DL
SP GR UR STRIP.AUTO: 1.01 (ref 1–1.03)
UROBILINOGEN UR STRIP.AUTO-MCNC: NORMAL MG/DL
WBC #/AREA URNS AUTO: >50 /HPF

## 2025-05-09 PROCEDURE — 81001 URINALYSIS AUTO W/SCOPE: CPT

## 2025-05-09 PROCEDURE — 87086 URINE CULTURE/COLONY COUNT: CPT

## 2025-05-09 NOTE — PROGRESS NOTES
Nursing Consultation Note  Patient: Mandie Amin  YOB: 1934  Age: 89 year old  Radiation Oncologist: Dr. Andrew Dixon  Referring Physician: Dr. Amanda Sandoval  Diagnosis: VULVAR SCC  Consult Date: 5/12/2025      Chemotherapy: on Libtayo q3 weeks, had 4 cycles  Labs: Reviewed  Imaging: Reviewed  Is the patient of child-bearing age?         No  Menarche: 11 y/o  Menopause: 57 y/o  No OCP or HRT use    Has the patient received radiation therapy in the past? no  Does the patient have an implantable device?No   Patient has/has had:     1. Assistive Devices: Walker    2. Flu Vaccination: yes    3. Pneumonia Vaccination:  yes    Vital Signs:   Vitals:    05/12/25 0831   BP: 158/81   Pulse: 71   Resp: 20   Temp: 98.1 °F (36.7 °C)   ,   Wt Readings from Last 6 Encounters:   05/12/25 53.7 kg (118 lb 6.4 oz)   05/08/25 54.4 kg (120 lb)   04/17/25 53.1 kg (117 lb)   03/27/25 54 kg (119 lb)   03/23/25 54.4 kg (120 lb)   03/06/25 53.3 kg (117 lb 9.6 oz)       Nursing Note: Diagnosed with invasive mod diff squamous cell ca of vulva in Oct 2024 by Dr. Fonseca. Met with Dr. Patel for second opinion, discussed surgery but pt declined. Met with Dr. Camp, started on Libtayo in Feb 2025. Repeat PET/CT on 5/1/25 showed new inguinal LAD. Case discussed in tumor board, referred for palliative RT. Pt here with 2 of her sons, AOX4. Denies pelvic or vaginal pain, denies vaginal discharge or bleeding. States has intermittent burning sensation when urinating, better if she is drinking adequate fluids. No c/o hematuria. States will need to see a urologist as recommended. Denies bowel problems or abdominal discomfort.          Review of Systems   Constitutional: Negative.    HENT: Negative.     Eyes: Negative.    Respiratory:  Positive for cough.         Has chronic bronchitis, has intermittent dry cough   Cardiovascular: Negative.    Gastrointestinal: Negative.    Endocrine: Negative.    Genitourinary:  Positive  for dysuria.        States occ burning sensation with urination, no hematuria   Musculoskeletal:  Positive for arthralgias.   Skin: Negative.    Allergic/Immunologic: Negative.    Neurological: Negative.    Hematological: Negative.    Psychiatric/Behavioral: Negative.            Allergies:  Allergies[1]    Current Medications[2]    Preferred Pharmacy:    [id number] - [place], [place] - [address] AT [facility] [address], [phone], [phone]  [id number] [place] [address]  Phone: [phone] Fax: [phone]      Past Medical History[3]    Past Surgical History[4]    Social History     Socioeconomic History    Marital status:      Spouse name: Not on file    Number of children: 4    Years of education: Not on file    Highest education level: Not on file   Occupational History    Occupation: RETIRED   Tobacco Use    Smoking status: Never    Smokeless tobacco: Never   Vaping Use    Vaping status: Never Used   Substance and Sexual Activity    Alcohol use: Not Currently     Alcohol/week: 0.0 - 0.8 standard drinks of alcohol     Comment: occ    Drug use: No    Sexual activity: Not Currently     Partners: Male   Other Topics Concern     Service Not Asked    Blood Transfusions Not Asked    Caffeine Concern Yes    Occupational Exposure Not Asked    Hobby Hazards Not Asked    Sleep Concern Not Asked    Stress Concern Not Asked    Weight Concern Not Asked    Special Diet Not Asked    Back Care Not Asked    Exercise Yes     Comment: some    Bike Helmet Not Asked    Seat Belt Not Asked    Self-Exams Not Asked   Social History Narrative    , lives alone    Has 4 sons that live close by and help her out     Social Drivers of Health     Food Insecurity: Not on file   Transportation Needs: Not on file   Housing Stability: Not on file       ECOG:  Grade 1 - No physically strenuous activity, but ambulatory and able to carry out light and sedentary work  (e.g. office work, light house work).    Education: YES  Knowledge Deficit Plan Of Care:    Problem:  Knowledge Deficit    Problems related to:    Radiation therapy    Interventions:  Instruct on purpose of radiation therapy    Expected Outcomes:  Knowledge of radiation therapy    Progress Toward Outcome:  Making progress    Pamphlets/Handouts Given to Patient:  Understanding radiation therapy      Are ADL's met?  Yes  Does patient feel safe in their environment?  Yes  Care decisions:  Patient and/or surrogate IS involved in care decisions.  Advanced directives:  Patient HAS advnaced directives and a copy has been requested.  Transportation:  Adequate transportation available for expected visits    Pain:   ;Pain Score: 0   ;    ;          [1]   Allergies  Allergen Reactions    Amoxicillin SWELLING     Swollen tongue   [2]   Current Outpatient Medications   Medication Sig Dispense Refill    Acetaminophen 325 MG Oral Cap Take by mouth.      warfarin 5 MG Oral Tab TAKE ONE-HALF TO ONE TABLET DAILY AS DIRECTED BY ANTICOAGULATION CLINIC 80 tablet 3    Losartan Potassium 25 MG Oral Tab Take 1 tablet (25 mg total) by mouth daily. 90 tablet 3    atorvastatin 10 MG Oral Tab Take 1 tablet (10 mg total) by mouth nightly. 90 tablet 3    digoxin (DIGOX) 0.125 MG Oral Tab TAKE 1 TABLET(125 MCG) BY MOUTH EVERY DAY 90 tablet 3    METOPROLOL TARTRATE 50 MG Oral Tab TAKE ONE TABLET BY MOUTH TWICE DAILY. 180 tablet 3    prednisoLONE acetate 1 % Ophthalmic Suspension Place 1 drop into both eyes in the morning and 1 drop before bedtime.  1    Vitamin D3 2000 units Oral Cap Take 1 capsule (2,000 Units total) by mouth in the morning.      ketorolac tromethamine 0.5 % Ophthalmic Solution Place 1 drop into both eyes in the morning and 1 drop before bedtime. 6AM AND 6PM. .      Dorzolamide HCl-Timolol Mal 22.3-6.8 MG/ML Ophthalmic Solution Place 1 drop into the right eye in the morning and 1 drop before bedtime.  2    levoFLOXacin 750 MG  Oral Tab Take 1 tablet (750 mg total) by mouth daily for 7 days. (Patient not taking: Reported on 5/12/2025) 7 tablet 0    traMADol 50 MG Oral Tab Take 1 tablet (50 mg total) by mouth daily as needed for Pain. (Patient not taking: Reported on 5/12/2025) 20 tablet 0    clotrimazole 1 % External Cream Apply 1 each topically 2 (two) times daily as needed. (Patient not taking: Reported on 5/12/2025) 1 each 0    Meclizine HCl 12.5 MG Oral Tab Take 1 tablet (12.5 mg total) by mouth as needed in the morning and 1 tablet (12.5 mg total) as needed at noon and 1 tablet (12.5 mg total) as needed in the evening. (Patient not taking: Reported on 5/12/2025)     [3]   Past Medical History:   Acute sinusitis    Arrhythmia    A-fib attacks 2003 and 2004    Asymptomatic microscopic hematuria    Since at least 2019 - seeing urologist 2022    Atrial fibrillation (HCC)    Benign paroxysmal vertigo    Breast lump    lower right breast    Bronchiectasis (HCC)    Cervical spondylosis    Cervical strain    Cervicalgia    Chronic cough    Diastolic dysfunction    Early cataract    Exudative age-related macular degeneration, left eye, with active choroidal neovascularization (HCC)    Hand fracture    third metacarpal phalangeal joint    Hard of hearing    Hearing impairment    NO HEARING AIDS    Heart disease    Heart murmur    on exam 2/1/22. Patient states she has a leaky valve    Herpes zoster    High blood pressure    High cholesterol    History of bone density study    Hx of motion sickness    IGT (impaired glucose tolerance)    Infection    right hand    Insomnia    Lipid screening    Liver lesion    Probably focal steatosis Unchanged 7 mos later, c/w hemangioma    Microscopic hematuria    Unremarkable cystoscopy 1/25/22 Dr. Fields. Renal US normal. CT urogram neg. 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     Ocular migraine    Open-angle glaucoma of right eye    Osteoarthritis    Other and unspecified hyperlipidemia    PAF (paroxysmal atrial  fibrillation) (HCC)    PONV (postoperative nausea and vomiting)    Prediabetes    DOESN'T CHECK BLOOD SUGARS AT HOME    Rectal bleed    Stress incontinence    Unspecified essential hypertension    Vertigo    episodic    CHULA III (vulvar intraepithelial neoplasia III)    VIN2-3 extending to biopsy margins. Left clitoral rocha. Dr. Rishabh Fonseca    Visual impairment    GLASSES    Vulvar cancer (HCC)    Vulvar cysts   [4]   Past Surgical History:  Procedure Laterality Date    Appendectomy  20 y/o    Colonoscopy  2007    diverticulosis    Colonoscopy  05/30/2013    Procedure: COLONOSCOPY;  Surgeon: Júnior Lawrence MD;  Location:  ENDOSCOPY    Correct bunion,simple      Cystourethroscopy  01/25/2022    Dr. [facility] - unremarkable cystoscopy in office. H/o asymptomatic microscopic hematuria with h/o normal renal US. CT urogram ordered.    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 Left clitoral rocha. Dr. Rishabh Fonseca

## 2025-05-12 ENCOUNTER — HOSPITAL ENCOUNTER (OUTPATIENT)
Dept: RADIATION ONCOLOGY | Facility: HOSPITAL | Age: OVER 89
End: 2025-05-12
Attending: RADIOLOGY
Payer: MEDICARE

## 2025-05-12 ENCOUNTER — TELEPHONE (OUTPATIENT)
Age: OVER 89
End: 2025-05-12

## 2025-05-12 VITALS
HEART RATE: 71 BPM | OXYGEN SATURATION: 96 % | WEIGHT: 118.38 LBS | SYSTOLIC BLOOD PRESSURE: 158 MMHG | RESPIRATION RATE: 20 BRPM | HEIGHT: 58 IN | DIASTOLIC BLOOD PRESSURE: 81 MMHG | TEMPERATURE: 98 F | BODY MASS INDEX: 24.85 KG/M2

## 2025-05-12 DIAGNOSIS — C51.9 SQUAMOUS CELL CARCINOMA OF VULVA (HCC): Primary | ICD-10-CM

## 2025-05-12 PROCEDURE — 99214 OFFICE O/P EST MOD 30 MIN: CPT

## 2025-05-12 NOTE — PATIENT INSTRUCTIONS
- WE WILL DISCUSS YOUR CASE IN OUR TUMOR BOARD AND ONE OF YOUR PHYSICIANS WILL FOLLOW-UP WITH YOU.       - IF YOU HAVE ANY QUESTIONS OR CONCERNS REGARDING RADIATION THERAPY, PLEASE CALL [phone].

## 2025-05-12 NOTE — TELEPHONE ENCOUNTER
MATERNAL FETAL MEDICINE HAS BEEN CONSULTED AND FOLLOWING THE PATIENT FOR THE FOLLOWING ISSUE(S) DURING THIS HOSPITALIZATION     1. Vaginal bleeding          SUBJECTIVE:     The patient reports the following events or issues over the past 24 hours no bleeding    CURRENT MEDICATIONS  Current Facility-Administered Medications   Medication Dose Route Frequency Provider Last Rate Last Admin   • magnesium oxide (MAG-OX) tablet 400 mg  400 mg Oral Daily Larry Topete MD   400 mg at 08/09/23 2158   • aspirin (ECOTRIN) enteric coated tablet 162 mg  162 mg Oral Daily Penny Lopez MD   162 mg at 08/09/23 2157   • hydrOXYzine (ATARAX) tablet 25 mg  25 mg Oral TID PRN Penny Lopez MD       • aluminum-magnesium hydroxide-simethicone (MAALOX) 200-200-20 MG/5ML suspension 30 mL  30 mL Oral Q4H PRN Penny Lopez MD       • docusate sodium (COLACE) capsule 200 mg  200 mg Oral Nightly PRN Penny Lopez MD       • acetaminophen (TYLENOL) tablet 650 mg  650 mg Oral Q4H PRN Penny Lopez MD       • calcium carbonate (TUMS) chewable tablet 500 mg  500 mg Oral Q4H PRN Penny Lopez MD       • famotidine (PEPCID) tablet 20 mg  20 mg Oral BID PRN Penny Lopez MD   20 mg at 08/09/23 2159   • simethicone (MYLICON) tablet 125 mg  125 mg Oral 4x Daily PRN Damir Davis MD   125 mg at 08/09/23 2159   • ondansetron (ZOFRAN ODT) disintegrating tablet 4 mg  4 mg Oral BID PRN Damir Davis MD   4 mg at 08/09/23 1558   • sodium chloride (PF) 0.9 % injection 2 mL  2 mL Injection 2 times per day Damir Davis MD   2 mL at 08/09/23 2202   • loratadine (CLARITIN) tablet 10 mg  10 mg Oral BID Damir Davis MD   10 mg at 08/09/23 2159   • diphenhydrAMINE (BENADRYL) capsule 50 mg  50 mg Oral Nightly PRN Damir Davis MD   50 mg at 08/09/23 2159   • hydroCORTisone (ANUSOL-HC) suppository 25 mg  25 mg Rectal BID PRN Damir Davis MD   25 mg at 08/09/23 2159   • PRENATAL vitamin  Spoke with patient. She verbalized understanding.     Thiago Camp MD  [facility]  Results reviewed. Appears to have a UTI-I will send a levaquin prescription             & mineral w/ folic acid 1 mg tablet 1 tablet  1 tablet Oral QHS Damir Davis MD   1 tablet at 08/09/23 2159       Allergies as of 08/08/2023 - Reviewed 08/08/2023   Allergen Reaction Noted   • Bupropion HEADACHES and Other (See Comments) 03/29/2023   • Sertraline Palpitations 03/29/2023   • Diclofenac Other (See Comments) 03/02/2018   • Topiramate Other (See Comments) 05/19/2015   • Amitriptyline Other (See Comments) 03/02/2018   • Depakote Other (See Comments) 01/22/2018   • Omeprazole HEADACHES 09/14/2022         PHYSICAL EXAMINATION:  VITAL SIGNS:    Patient Vitals for the past 24 hrs:   BP Temp Temp src Pulse Resp SpO2   08/10/23 0602 104/57 -- -- (!) 58 -- 97 %   08/10/23 0600 104/57 96.8 °F (36 °C) Temporal (!) 58 16 97 %   08/09/23 2150 101/58 96.5 °F (35.8 °C) Temporal 66 15 96 %   08/09/23 1556 105/57 97 °F (36.1 °C) Temporal 74 16 96 %   08/09/23 1225 108/55 96.9 °F (36.1 °C) Temporal 69 16 98 %   08/09/23 0909 126/74 97.3 °F (36.3 °C) Temporal 87 16 94 %     Input and output for past 24 hours No intake or output data in the 24 hours ending 08/10/23 0812    Abdomen; Gravid and     MOST RECENT LAB RESULTS FROM THIS VISIT:  Results for orders placed or performed during the hospital encounter of 08/08/23   TYPE/SCREEN   Result Value    ABO/RH(D) O Rh Positive    ANTIBODY SCREEN Negative    TYPE AND SCREEN EXPIRATION DATE 08/11/2023 23:59       [facility] ULTRASOUND IF PERFORMED TODAY SHOWED    ASSESSMENT AND PLAN:  A 25w6d pregnancy being followed by [facility] for     PROBLEMS.  1. Vaginal bleeding    Plan for Previa with bleeding  1. IV access and active type and screen at all times  2. Magnesium 4 g bolus for neuro protection if delivery expected within 24 hours  3. Delivery for life-threatening hemorrhage, requiring more than 2 units of PRBCs in 24 hrs to maintain Hgb of 8, DIC, Cat 3 FHTs, BPP 4/10  4. We will repeat cervical imaging on August 11 to see whether needs to stay in-house for may be  Messi Vera MD  8/10/2023  8:12 AM

## 2025-05-12 NOTE — CONSULTS
[facility]    PATIENT'S NAME: VITO DEUTSCH   RADIATION ONCOLOGIST: Andrew Dixon M.D.   PATIENT ACCOUNT #: [id number] LOCATION: [facility]   [facility]   MEDICAL RECORD #: [id number] YOB: 1934   CONSULTATION DATE: 05/12/2025       RADIATION ONCOLOGY CONSULTATION    REFERRING PHYSICIAN:  Thiago Camp MD    DIAGNOSIS:  Vulvar cancer, stage III.    HISTORY OF PRESENT ILLNESS:  The patient is a 89-year-old female with a history of vulvar cancer.  She initially presented in June 2023 with complaints of vaginal prolapse, and she saw Dr. Fonseca on 08/30/2023 for a clitoral rocha biopsy.  This showed moderate to severe dysplasia of CHULA 2 to 3.  Laser vaporization was recommended at that time, but the patient did not undergo that procedure.  Over 1 year later in October 2024, she had a repeat vulvar biopsy by Dr. Fonseca which revealed invasive moderately-differentiated squamous cell carcinoma.  He recommended an anterior radial vulvectomy with bilateral sentinel groin node biopsies, but that did not take place.  She ultimately saw Dr. Patel who agreed with the recommendation of vulvectomy, but the patient was unenthusiastic about surgery and wanted to explore other options.  She saw Dr. Camp in December 2024 who said that immunotherapy could be utilized with the potential for consolidative radiotherapy.  She then began Libtayo and has been on that medication for the last 6 months.  She has tolerated this fairly well.  A most recent PET scan on 05/01/2025 shows essential stability of the primary tumor mass, but now she does have bilateral inguinal foci of lymphadenopathy.  Both of these had high SUVs.  She does have a concurrent urine infection at this point.  Given that she has had progression, she then is referred to Radiation Oncology for consideration for radiation treatment at this time.    Currently, the patient has symptoms from her UTI including some intermittent burning with urination.  She  denies any bowel-related issues and has no pelvic or vaginal pain.  She denies any vaginal discharge or bleeding and has no changes in appetite or weight loss.    PAST MEDICAL HISTORY:  The patient has a past history of atrial fibrillation, hyperlipidemia, hypertension, stress incontinence, cataracts, migraine headaches, and vulvar cancer as per HPI.    PAST SURGICAL HISTORY:  Appendectomy, bilateral total knee replacements over 20 years ago, left foot surgery, tonsillectomy, and vulvar biopsies.    MEDICATIONS:  Acetaminophen, atorvastatin, clotrimazole, digoxin, dorzolamide, ketorolac, losartan, meclizine, metoprolol, tramadol, and Coumadin.    ALLERGIES:  Amoxicillin.    FAMILY HISTORY:  Mother with colon cancer and father with bladder cancer.  Of note is that she has a son who is in hospice for unknown reason at this point.    SOCIAL HISTORY:  The patient is a never smoker who reports occasional alcohol use.  She is seen in consultation with 2 of her 4 sons.  They deny transportation-related difficulties.    REVIEW OF SYSTEMS:  A 10-point review of systems is performed.  Pertinent positives and negatives are as per HPI.      PHYSICAL EXAMINATION:    GENERAL:  A 89-year-old female who is pleasant, cooperative, and alert, awake, oriented x3.  She is in no acute distress.  She has an ECOG performance score of 1 and a current pain score of 0.  VITAL SIGNS:  Blood pressure 158/81, pulse 71, respiratory rate 20, and temperature 98.1.  Her weight is 118 pounds.  NECK:  Supple with no lymphadenopathy.  LUNGS:  Clear to auscultation bilaterally.  HEART:  Regular rate and rhythm.  Normal S1, S2, and no audible murmurs.  LYMPHATICS:  No supraclavicular, axillary, inguinal lymphadenopathy.  ABDOMEN:  Benign.    PELVIC:  The patient deferred a pelvic exam today.    IMPRESSION:  This is a 89-year-old female with a history of a vulvar carcinoma.  She was never interested in surgery and was treated with immunotherapy but has  had some progression on recent imaging including stability at the primary site but bilateral positive inguinal nodes at this time.  She also has a concurrent UTI.    RECOMMENDATIONS:  I do believe the patient is a reasonable candidate for radiation.  I told the patient and her sons that treatment would be palliative in nature only and may extend progression-free survival but does not have a real chance for a long-term cure.  Generally speaking, this disease is treated surgically first, but the patient is not terribly interested in the procedures as this may be fairly morbid.    We then had a long and thorough discussion about all the radiation treatments.  I told her that this would be difficult in and of itself.  I would recommend 5040 cGy in 180 cGy daily fractions and would treat the primary tumor site plus all draining lymph nodes including the bilateral inguinal nodes.  I would utilize both intensity-modulated radiotherapy as well as image guidance to get the most accurate and precise treatment possible while trying to minimize morbidity to surrounding structures.  I am optimistic that this may control her disease but I do not expect a reasonable chance of cure.  I do think, however, that we may delay time to progression by a meaningful amount, perhaps 1 year or more.  Given the patient's advanced age, this may be of meaningful benefit.    I did have a long talk with the patient and her sons regarding the potential side effects of this treatment.  I did tell them that this treatment can be quite difficult.  She will experience significant skin reaction in the treatment area.  This includes redness, itching, peeling, dryness, and blistering.  It would be akin to a bad sunburn in the region and would extend across the perineum to the anal area.  These side effects will worsen during the course of therapy and will certainly cause meaningful pain which may require pain medications.  She also will have urinary  frequency, burning, and other symptoms akin to a UTI.  She will also have some loose stools or diarrhea, and fatigue is common.  All these side effects will worsen during treatment but should resolve within a few weeks after radiation is complete.  The side effects may be substantial and cause her to require a delay or break in therapy.  All the side effects may be fairly severe during treatment.  I do expect that she would recover well.  It does sometimes take the extreme elderly a bit longer to recover, so her recover time could be expected to be between 2 to 4 weeks for the bulk of her side effects to resolve.  Following this long and thorough discussion of all the risks and benefits of treatment, the patient and her family indicated that they wish to discuss treatment further before making an ultimate decision regarding therapy.  I believe this is perfectly appropriate.  The patient also has a son in hospice care currently, so she feels that she may not wish to be undergoing treatment currently and I believe that is also reasonable.  Given that she will not have surgery, cure is really not in the range of outcomes, and I, therefore, feel that radiating now or radiating a few months from now is unlikely to make a dramatic difference so long as the disease does not continue to progress dramatically.    She is scheduled to see Dr. Patel again on Wednesday.  We can discuss her case further at our Multidisciplinary Conference and determine whether or not there are other potential options from a systemic standpoint that may makes sense.  If not, I believe radiation is a reasonable choice, but I also believe that if the patient would like to hold off for the time being given her family concerns, I believe this is reasonable.  We, therefore, will discuss the case on Wednesday and contact the patient regarding these discussions and allow them to make a choice as to if and when they wish to proceed with  radiation.    Thank you very much for allowing me the opportunity to participate in the care of this patient.  If there should be questions regarding the radiotherapy, please feel free to contact me at any time.     Dictated By Andrew Dixon M.D.  d: 05/12/2025 09:44:45  t: 05/12/2025 10:12:44  Job [id number]  NAD/    cc: MERCY Lujan M.D. Rohini Bhat, M.D.

## 2025-05-14 ENCOUNTER — OFFICE VISIT (OUTPATIENT)
Dept: SURGERY | Facility: CLINIC | Age: OVER 89
End: 2025-05-14
Payer: MEDICARE

## 2025-05-14 VITALS
RESPIRATION RATE: 16 BRPM | WEIGHT: 117 LBS | OXYGEN SATURATION: 98 % | BODY MASS INDEX: 24 KG/M2 | HEART RATE: 85 BPM | SYSTOLIC BLOOD PRESSURE: 140 MMHG | TEMPERATURE: 98 F | DIASTOLIC BLOOD PRESSURE: 67 MMHG

## 2025-05-14 DIAGNOSIS — C51.9 SQUAMOUS CELL CARCINOMA OF VULVA (HCC): Primary | ICD-10-CM

## 2025-05-14 PROCEDURE — 99213 OFFICE O/P EST LOW 20 MIN: CPT | Performed by: SURGERY

## 2025-05-14 NOTE — PROGRESS NOTES
[facility] Surgical Oncology      Patient Name:  Mandie Amin   YOB: 1934   Gender:  Female   Appt Date:  5/14/25   Provider:  Lee Patel MD     PATIENT PROVIDERS  Referring Provider: Lowell Cleary MD    Primary Care Provider:Roro Middleton MD   Address: [address] [place] [id number]   Phone #: [phone]       CHIEF COMPLAINT  Chief Complaint   Patient presents with    Follow - Up     SCC        PROBLEMS  Reviewed   Patient Active Problem List   Diagnosis    Cervical spondylosis    Paroxysmal atrial fibrillation (HCC)    Diastolic dysfunction    Mild aortic insufficiency    Hyperlipidemia    Bronchiectasis (HCC)    Essential hypertension    Long term (current) use of anticoagulants    Pre-diabetes    Primary osteoarthritis of right hip    Cystoid macular edema of right eye    Gallbladder polyp    Microhematuria    PHN (postherpetic neuralgia)    Uterovaginal prolapse    Vaginal atrophy    Chronic vulvitis    Urethral caruncle    Cystocele, midline    Urgency incontinence    Vulvar dystrophy    Squamous cell carcinoma of vulva (HCC)        History of Present Illness:  Patient returns today for a follow-up visit.    6/9/2023: Consultation with OBGYN Kristen Oneil MD with complaints of prolapse/bulge/irritation of clitoris. Had tried clobetasol, estrogen cream,and nystatin with no improvement. Exam noted raw and irregularly textured area inferior to clitoris. Referred to Gyn Onc for biopsy.     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  Recommended laser vaporization of the vulva.  Patient did not undergo treatment at that time due to patient concerns for cardiac risk.     10/10/2024: Repeat vulvar biopsy by Dr Fonseca --> invasive moderately differentiated squamous cell carcinoma  Recommended anterior radial vulvectomy with bilateral sentinel groin node biopsies, as well as pre operative CT A/P.    She then began  [place] with Dr. Camp. A most recent PET scan on 05/01/2025 shows essential stability of the primary tumor mass, but now she does have bilateral inguinal foci of lymphadenopathy.     She does endorse painful clitoris especially when sitting, minor bleeding from left side of clitoral rocha.             Vital Signs:  /67 (BP Location: Right arm, Patient Position: Sitting, Cuff Size: adult)   Pulse 85   Temp 98.1 °F (36.7 °C) (Tympanic)   Resp 16   Wt 53.1 kg (117 lb)   LMP  (LMP Unknown)   SpO2 98%   BMI 24.45 kg/m²      Medications Reviewed:    Current Outpatient Medications:     traMADol 50 MG Oral Tab, Take 1 tablet (50 mg total) by mouth daily as needed for Pain., Disp: 20 tablet, Rfl: 0    warfarin 5 MG Oral Tab, TAKE ONE-HALF TO ONE TABLET DAILY AS DIRECTED BY ANTICOAGULATION CLINIC, Disp: 80 tablet, Rfl: 3    Losartan Potassium 25 MG Oral Tab, Take 1 tablet (25 mg total) by mouth daily., Disp: 90 tablet, Rfl: 3    atorvastatin 10 MG Oral Tab, Take 1 tablet (10 mg total) by mouth nightly., Disp: 90 tablet, Rfl: 3    digoxin (DIGOX) 0.125 MG Oral Tab, TAKE 1 TABLET(125 MCG) BY MOUTH EVERY DAY, Disp: 90 tablet, Rfl: 3    METOPROLOL TARTRATE 50 MG Oral Tab, TAKE ONE TABLET BY MOUTH TWICE DAILY., Disp: 180 tablet, Rfl: 3    prednisoLONE acetate 1 % Ophthalmic Suspension, Place 1 drop into both eyes in the morning and 1 drop before bedtime., Disp: , Rfl: 1    Vitamin D3 2000 units Oral Cap, Take 1 capsule (2,000 Units total) by mouth in the morning., Disp: , Rfl:     ketorolac tromethamine 0.5 % Ophthalmic Solution, Place 1 drop into both eyes in the morning and 1 drop before bedtime. 6AM AND 6PM. ., Disp: , Rfl:     Dorzolamide HCl-Timolol Mal 22.3-6.8 MG/ML Ophthalmic Solution, Place 1 drop into the right eye in the morning and 1 drop before bedtime., Disp: , Rfl: 2    levoFLOXacin 750 MG Oral Tab, Take 1 tablet (750 mg total) by mouth daily for 7 days. (Patient not taking: Reported on 5/12/2025),  Disp: 7 tablet, Rfl: 0    Acetaminophen 325 MG Oral Cap, Take by mouth., Disp: , Rfl:     clotrimazole 1 % External Cream, Apply 1 each topically 2 (two) times daily as needed. (Patient not taking: Reported on 5/12/2025), Disp: 1 each, Rfl: 0    Meclizine HCl 12.5 MG Oral Tab, Take 1 tablet (12.5 mg total) by mouth as needed in the morning and 1 tablet (12.5 mg total) as needed at noon and 1 tablet (12.5 mg total) as needed in the evening. (Patient not taking: Reported on 5/12/2025), Disp: , Rfl:      Allergies Reviewed:  Allergies[1]     History:  Reviewed:  Past Medical History:    Acute sinusitis    Arrhythmia    A-fib attacks 2003 and 2004    Asymptomatic microscopic hematuria    Since at least 2019 - seeing urologist 2022    Atrial fibrillation (HCC)    Benign paroxysmal vertigo    Breast lump    lower right breast    Bronchiectasis (HCC)    Cervical spondylosis    Cervical strain    Cervicalgia    Chronic cough    Diastolic dysfunction    Early cataract    Exudative age-related macular degeneration, left eye, with active choroidal neovascularization (HCC)    Hand fracture    third metacarpal phalangeal joint    Hard of hearing    Hearing impairment    NO HEARING AIDS    Heart disease    Heart murmur    on exam 2/1/22. Patient states she has a leaky valve    Herpes zoster    High blood pressure    High cholesterol    History of bone density study    Hx of motion sickness    IGT (impaired glucose tolerance)    Infection    right hand    Insomnia    Lipid screening    Liver lesion    Probably focal steatosis Unchanged 7 mos later, c/w hemangioma    Microscopic hematuria    Unremarkable cystoscopy 1/25/22 Dr. Fields. Renal US normal. CT urogram neg. 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 Left clitoral rocha. Dr. Rishabh Fonseca    Visual impairment    GLASSES    Vulvar cancer (HCC)    Vulvar cysts      Reviewed:  Past Surgical History:   Procedure Laterality Date    Appendectomy  18 y/o    Colonoscopy  2007    diverticulosis    Colonoscopy  05/30/2013    Procedure: COLONOSCOPY;  Surgeon: Júnior Lawrence MD;  Location:  ENDOSCOPY    Correct bunion,simple      Cystourethroscopy  01/25/2022    Dr. Fields [facility] - unremarkable cystoscopy in office. H/o asymptomatic microscopic hematuria with h/o normal renal US. CT urogram ordered.    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 Left clitoral rocha. Dr. Rishabh Fonseca      Reviewed Social History:  Social History     Socioeconomic History    Marital status:     Number of children: 4   Occupational History    Occupation: RETIRED   Tobacco Use    Smoking status: Never    Smokeless tobacco: Never   Vaping Use    Vaping status: Never Used   Substance and Sexual Activity    Alcohol use: Not Currently     Alcohol/week: 0.0 - 0.8 standard drinks of alcohol     Comment: occ    Drug use: No    Sexual activity: Not Currently     Partners: Male   Other Topics Concern    Caffeine Concern Yes    Exercise Yes     Comment: some   Social History Narrative    , lives alone    Has 4 sons that live close by and help her out      Reviewed:  Family History   Problem Relation Age of Onset    Other (bladder cancer) Father 80    Colon Cancer Mother     Other (Tobacco use) Sister     Other (Atrial fibrillation) Sister     Heart Disease Brother     Other (MI,) Brother 52    Breast Cancer Other          niece and herman    Other (DM) Other       Review of Systems:  Painful vulvar region  No bleeding       Physical Examination:  Constitutional: NAD.   Eyes: Sclera: non-icteric.   Abdomen: Soft, no masses.  GYN: Ulcerated ~2 centimeter lesion centrally located and bilateral vulvar  Musculoskeletal: Extremities: no edema.   Skin: Inspection and palpation: no jaundice.      Document Review:  Pertinent records       Procedure(s):  None     Assessment / Plan:  Squamous cell carcinoma of vulva, left  -Central lesion at least 2.5 mm invasion  Findings were revisited with the patient and her sons.  The case was recently presented at our multidisciplinary tumor board and palliative radiation was recommended. Will further discuss with our treating colleagues potential options, if any, from a systemic standpoint.  Patient agreed and understood.  She and her family had ample time to ask questions.              Electronically Signed by: Lee Patel MD             [1]   Allergies  Allergen Reactions    Amoxicillin SWELLING     Swollen tongue

## 2025-05-23 ENCOUNTER — TELEPHONE (OUTPATIENT)
Age: OVER 89
End: 2025-05-23

## 2025-05-23 NOTE — TELEPHONE ENCOUNTER
Called patient back. She let me know that she thought Dr. Patel and Dr. Camp would be talking about a plan for treatment on Wednesday at tumor board. She was expecting a call back by this week but she has not heard anything. She would like Dr. Camp to call her with a plan of care.

## 2025-05-23 NOTE — TELEPHONE ENCOUNTER
Mandie calling she's asking about her chemo and another way she can get it. Please reach out to her. Thank you tricia

## 2025-05-30 ENCOUNTER — TELEPHONE (OUTPATIENT)
Age: OVER 89
End: 2025-05-30

## 2025-06-02 ENCOUNTER — TELEPHONE (OUTPATIENT)
Dept: INTERNAL MEDICINE CLINIC | Facility: CLINIC | Age: OVER 89
End: 2025-06-02

## 2025-06-02 DIAGNOSIS — K82.4 GALLBLADDER POLYP: Primary | ICD-10-CM

## 2025-06-02 NOTE — TELEPHONE ENCOUNTER
Pt reaching out because she is wondering when her next ultrasound abdomen should be. She completed one on 2/6/25. She is wondering if she should have one in 6 months or in a year. Please reach out to pt by phone. Thanks!

## 2025-06-02 NOTE — TELEPHONE ENCOUNTER
There was not any follow up needed per the radiologist but if she would like it repeated 1 year would be fine. Gallbladder polyp is the diagnosis. Thanks.

## 2025-06-02 NOTE — TELEPHONE ENCOUNTER
Pt informed and verbalized understanding. She would like to repeat this, order placed for around 2/6/26 and she verbalized understanding.    She informed me unfortunately one of her sons passed away. I gave her our sincerest condolences which she appreciated, she asked that we please pass the message along to you. She understandably is dealing with a lot, I let her know we're here for her and to take her time and follow up with us when she can. Thank you.

## 2025-06-06 ENCOUNTER — NURSE ONLY (OUTPATIENT)
Age: OVER 89
End: 2025-06-06
Attending: INTERNAL MEDICINE
Payer: MEDICARE

## 2025-06-09 ENCOUNTER — TELEPHONE (OUTPATIENT)
Age: OVER 89
End: 2025-06-09

## 2025-06-09 NOTE — TELEPHONE ENCOUNTER
Pt called stated she had labs done Friday and would like a call back with the results    Please call pt back

## 2025-06-23 ENCOUNTER — TELEPHONE (OUTPATIENT)
Age: OVER 89
End: 2025-06-23

## 2025-07-03 ENCOUNTER — APPOINTMENT (OUTPATIENT)
Facility: LOCATION | Age: OVER 89
End: 2025-07-03
Attending: INTERNAL MEDICINE
Payer: MEDICARE

## 2025-07-08 NOTE — PROGRESS NOTES
Hematology and Oncology Clinic Note    Diagnosis: Vulvar Squamous cell carcinoma.     Treatment History:   Libtayo     Visit Diagnosis:  1. Vulvar cancer, carcinoma (HCC)        History of Present Illness: 89F referred by Dr. Patel to discuss Vulvar Squamous cell carcinoma.     In June 2023, she met with gynecology for a vaginal bulge. No improvement with topical therapy. Met with Dr. Fonseca in 08/2023 and noted to have moderate-severe dysplasia  but surgery not done due to cardiac risk. Laser therapy recommended but not done. Symptoms worsened. Vulva biopsy from [facility] in 10/2024 showed invasive moderately differentiated squamous cell carcinoma. She met with Dr. Patel for a 2nd opinion and a radical vulvectomy with bilateral sentinel LN bx was discussed.  Patient herself does not want surgery.   CT AP from 12/2024 showed small sub-cm liver foci to small to characterize which is stable since 2019 per radiology. CXR unremarkable. Her case was discuss with surgical oncology and tumor board. Given the morbidity of the surgery and patient preference,  immunotherapy was recommended. No history of autoimmune disease. Baseline PET/CT from 12/19/24 showed increased FDG uptake in the vulva. We recommended to start start systemic but patient and family opted to wait.     Libtayo   -C1: 2/13/25   -C2: 3/6/25   -C3: 3/27/25   -C4: 4/17/25   -PET/CT 5/1/25-Vulvar mass appears the same. However, there appears to be new inguinal LAD. WE discussed her case in tumor board and the recommendation is to proceed with palliative RT.    -She met with Dr. Simeon Dixon on 5/12/25. Discussed RT. Appears to have opted against it due to potential side effects   -Reviewed again in tumor board, palliative RT (lower doses) can be considered     -Tempus 6/21/25--No actionable mutation. TMB LOW. MSI-stable. PDL1 < 1.     Interval History  -reviewed tempus  -Has chronic burning with urination. Seems like it happens at night or when she does not  drink much water. She is going to discuss with PCP  -Has some pain when she sits down but its tolerable   -Discussed that we can repeat imaging around first week of August  -No cough, dyspnea or diarrhea     Review of Systems: 12 Point ROS was completed and pertinent positives are in the HPI    Medications Ordered Prior to Encounter[1]  Past Medical History:    Acute sinusitis    Arrhythmia    A-fib attacks 2003 and 2004    Asymptomatic microscopic hematuria    Since at least 2019 - seeing urologist 2022    Atrial fibrillation (HCC)    Benign paroxysmal vertigo    Breast lump    lower right breast    Bronchiectasis (HCC)    Cervical spondylosis    Cervical strain    Cervicalgia    Chronic cough    Diastolic dysfunction    Early cataract    Exudative age-related macular degeneration, left eye, with active choroidal neovascularization (HCC)    Hand fracture    third metacarpal phalangeal joint    Hard of hearing    Hearing impairment    NO HEARING AIDS    Heart disease    Heart murmur    on exam 2/1/22. Patient states she has a leaky valve    Herpes zoster    High blood pressure    High cholesterol    History of bone density study    Hx of motion sickness    IGT (impaired glucose tolerance)    Infection    right hand    Insomnia    Lipid screening    Liver lesion    Probably focal steatosis Unchanged 7 mos later, c/w hemangioma    Microscopic hematuria    Unremarkable cystoscopy 1/25/22 Dr. Fields. Renal US normal. CT urogram neg. 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 Left clitoral  rocha. Dr. Rishabh Fonseca    Visual impairment    GLASSES    Vulvar cancer (HCC)    Vulvar cysts     Past Surgical History:   Procedure Laterality Date    Appendectomy  20 y/o    Colonoscopy  2007    diverticulosis    Colonoscopy  05/30/2013    Procedure: COLONOSCOPY;  Surgeon: Júnior Lawrence MD;  Location:  ENDOSCOPY    Correct bunion,simple      Cystourethroscopy  01/25/2022    Dr. Fields [facility] - unremarkable cystoscopy in office. H/o asymptomatic microscopic hematuria with h/o normal renal US. CT urogram ordered.    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 Left clitoral rocha. Dr. Rishabh Fonseca     Social History     Socioeconomic History    Marital status:     Number of children: 4   Occupational History    Occupation: RETIRED   Tobacco Use    Smoking status: Never    Smokeless tobacco: Never   Vaping Use    Vaping status: Never Used   Substance and Sexual Activity    Alcohol use: Not Currently     Alcohol/week: 0.0 - 0.8 standard drinks of alcohol     Comment: occ    Drug use: No    Sexual activity: Not Currently     Partners: Male      Family History   Problem Relation Age of Onset    Other (bladder cancer) Father 80    Colon Cancer Mother     Other (Tobacco use) Sister     Other (Atrial fibrillation) Sister     Heart Disease Brother     Other (MI,) Brother 52    Breast Cancer Other         niece and herman    Other (DM) Other        Physical Exam  Height: 147.3 cm (4' 9.99\") (07/09 1043)  Weight: 51.3 kg (113 lb) (07/09 1043)  BSA (Calculated - sq m): 1.43 sq meters (07/09 1043)  Pulse: 57 (07/09 1043)  BP: 152/62 (07/09 1043)  Temp: 97.2 °F (36.2 °C) (07/09 1043)  Do Not Use - Resp Rate: --  SpO2: 100 % (07/09 1043)    General: NAD, AOX3  HEENT: clear op, mmm, no jvd, no scleral icterus  CV: RR  Extremities: No edema   Lungs: no increased  work of breathing  Abd: non distended   Neuro: CN: II-XII grossly intact      Results:  Lab Results   Component Value Date    WBC 6.2 05/08/2025    HGB 12.9 05/08/2025    HCT 38.6 05/08/2025    MCV 86.9 05/08/2025    .0 05/08/2025     Lab Results   Component Value Date     (L) 05/08/2025    K 4.7 05/08/2025    CO2 27.0 05/08/2025     05/08/2025    BUN 13 05/08/2025    ALB 4.7 05/08/2025       No results found for: \"LDH\"    Radiology: reviewed   PET/CT 5/1/25  1. Within the midline of the labial folds is an ovoid soft tissue density with a max SUV of 16.9 measuring 2.6 x 1.6 cm.  This is consistent with the patient's known history of vulvar cancer.   2. Two foci of lymphadenopathy, 1 within the right inguinal region with a max SUV of 12.6 and the other within the left inguinal region with a max SUV of 12.3.   3. No evidence of distant metastatic disease.     PET/CT 12/2024  1. Increased FDG uptake involving the vulva, predominantly in the left with associated thickening suggestive of underlying lesion, though this is not well delineated on noncontrast CT.  This may be further evaluated with contrast enhanced MRI as   clinically indicated.   2. Focal uptake within the left 12th rib corresponding to a nondisplaced fracture.  Underlying pathologic fractures not entirely excluded, correlate clinically for recent trauma.  Otherwise no evidence of distant metastatic disease.   Please see above for further details.     Assessment and Plan:  Vulvar Squamous Cell Carcinoma  -Appears to be localized disease with negative imaging. Given morbidly of surgery, patient preference and her age she would like to consider immunotherapy. After 4 cycles of immunotherapy, her mass appears unchanged however there is new bilateral inguinal LAD. WE reviewed her case in tumor board and the recommendation is to proceed with palliative RT. I discussed that RT is reasonable since this will likely delay or prevent any future  pain. She met with radiation oncology and she opted against RT at that time. We performed Tempus/NGS and no actionable mutations were found. MSI-S as well. We discussed that she can consider a lower palliative dose of RT if symptomatic, however she is leaning against RT. She does not want chemotherapy. She would like a another trial of immunotherapy which is reasonable.     Plan   -We discussed that we can repeat a PET/CT for baseline.   -Restart immunotherapy (Libtayo) after this scan.  -We will proceed with 4 cycles and repeat imaging afterwards    -She should meet with palliative care on the next visit.     A fib: on coumadin and digoxin    EDUARD Camp MD  [facility]         [1]   Current Outpatient Medications on File Prior to Visit   Medication Sig Dispense Refill    traMADol 50 MG Oral Tab Take 1 tablet (50 mg total) by mouth daily as needed for Pain. 20 tablet 0    Acetaminophen 325 MG Oral Cap Take by mouth.      clotrimazole 1 % External Cream Apply 1 each topically 2 (two) times daily as needed. (Patient not taking: Reported on 5/12/2025) 1 each 0    warfarin 5 MG Oral Tab TAKE ONE-HALF TO ONE TABLET DAILY AS DIRECTED BY ANTICOAGULATION CLINIC 80 tablet 3    Losartan Potassium 25 MG Oral Tab Take 1 tablet (25 mg total) by mouth daily. 90 tablet 3    atorvastatin 10 MG Oral Tab Take 1 tablet (10 mg total) by mouth nightly. 90 tablet 3    digoxin (DIGOX) 0.125 MG Oral Tab TAKE 1 TABLET(125 MCG) BY MOUTH EVERY DAY 90 tablet 3    METOPROLOL TARTRATE 50 MG Oral Tab TAKE ONE TABLET BY MOUTH TWICE DAILY. 180 tablet 3    prednisoLONE acetate 1 % Ophthalmic Suspension Place 1 drop into both eyes in the morning and 1 drop before bedtime.  1    Vitamin D3 2000 units Oral Cap Take 1 capsule (2,000 Units total) by mouth in the morning.      ketorolac tromethamine 0.5 % Ophthalmic Solution Place 1 drop into both eyes in the morning and 1 drop before bedtime. 6AM AND 6PM. .      Meclizine HCl 12.5 MG  Oral Tab Take 1 tablet (12.5 mg total) by mouth as needed in the morning and 1 tablet (12.5 mg total) as needed at noon and 1 tablet (12.5 mg total) as needed in the evening. (Patient not taking: Reported on 5/12/2025)      Dorzolamide HCl-Timolol Mal 22.3-6.8 MG/ML Ophthalmic Solution Place 1 drop into the right eye in the morning and 1 drop before bedtime.  2     No current facility-administered medications on file prior to visit.

## 2025-07-09 ENCOUNTER — OFFICE VISIT (OUTPATIENT)
Age: OVER 89
End: 2025-07-09
Attending: INTERNAL MEDICINE
Payer: MEDICARE

## 2025-07-09 VITALS
RESPIRATION RATE: 16 BRPM | DIASTOLIC BLOOD PRESSURE: 62 MMHG | BODY MASS INDEX: 23.72 KG/M2 | SYSTOLIC BLOOD PRESSURE: 152 MMHG | HEART RATE: 57 BPM | OXYGEN SATURATION: 100 % | WEIGHT: 113 LBS | HEIGHT: 57.99 IN | TEMPERATURE: 97 F

## 2025-07-09 DIAGNOSIS — C51.9 VULVAR CANCER, CARCINOMA (HCC): Primary | ICD-10-CM

## 2025-07-09 DIAGNOSIS — C51.8 MALIGNANT NEOPLASM OF OVERLAPPING SITES OF VULVA (HCC): ICD-10-CM

## 2025-07-09 NOTE — PROGRESS NOTES
Patient in today for follow-up. Patient complains of pain while sitting, states pain is 6/10. She also states she's experiencing intermittent painful urination.

## 2025-07-11 ENCOUNTER — TELEPHONE (OUTPATIENT)
Age: OVER 89
End: 2025-07-11

## 2025-07-11 NOTE — TELEPHONE ENCOUNTER
Returned call to patient to answer questions. Patient was concerned about new urinary symptoms and if they may lead to an interruption in the immunotherapy. Encouraged patient to complete the course of antibiotics as sent by primary care service and take it one step at a time. Patient agreed and no other questions at this time.

## 2025-07-12 ENCOUNTER — LAB ENCOUNTER (OUTPATIENT)
Dept: LAB | Facility: HOSPITAL | Age: OVER 89
End: 2025-07-12
Attending: STUDENT IN AN ORGANIZED HEALTH CARE EDUCATION/TRAINING PROGRAM
Payer: MEDICARE

## 2025-07-12 DIAGNOSIS — N30.00 ACUTE CYSTITIS WITHOUT HEMATURIA: ICD-10-CM

## 2025-07-12 LAB
BILIRUB UR QL STRIP.AUTO: NEGATIVE
CLARITY UR REFRACT.AUTO: CLEAR
GLUCOSE UR STRIP.AUTO-MCNC: NORMAL MG/DL
KETONES UR STRIP.AUTO-MCNC: NEGATIVE MG/DL
LEUKOCYTE ESTERASE UR QL STRIP.AUTO: 500
NITRITE UR QL STRIP.AUTO: NEGATIVE
PH UR STRIP.AUTO: 6 [PH] (ref 5–8)
PROT UR STRIP.AUTO-MCNC: NEGATIVE MG/DL
SP GR UR STRIP.AUTO: 1 (ref 1–1.03)
UROBILINOGEN UR STRIP.AUTO-MCNC: NORMAL MG/DL

## 2025-07-12 PROCEDURE — 81001 URINALYSIS AUTO W/SCOPE: CPT

## 2025-07-12 PROCEDURE — 87086 URINE CULTURE/COLONY COUNT: CPT

## 2025-07-19 ENCOUNTER — OFFICE VISIT (OUTPATIENT)
Dept: INTERNAL MEDICINE CLINIC | Facility: CLINIC | Age: OVER 89
End: 2025-07-19
Payer: MEDICARE

## 2025-07-19 VITALS
SYSTOLIC BLOOD PRESSURE: 138 MMHG | DIASTOLIC BLOOD PRESSURE: 74 MMHG | OXYGEN SATURATION: 100 % | WEIGHT: 113.63 LBS | BODY MASS INDEX: 23.85 KG/M2 | HEART RATE: 56 BPM | TEMPERATURE: 97 F | RESPIRATION RATE: 16 BRPM | HEIGHT: 57.99 IN

## 2025-07-19 DIAGNOSIS — R09.81 NASAL CONGESTION: ICD-10-CM

## 2025-07-19 DIAGNOSIS — R22.32 MASS OF LEFT AXILLA: ICD-10-CM

## 2025-07-19 DIAGNOSIS — R39.9 UTI SYMPTOMS: Primary | ICD-10-CM

## 2025-07-19 PROCEDURE — 99214 OFFICE O/P EST MOD 30 MIN: CPT | Performed by: NURSE PRACTITIONER

## 2025-07-19 PROCEDURE — G2211 COMPLEX E/M VISIT ADD ON: HCPCS | Performed by: NURSE PRACTITIONER

## 2025-07-19 RX ORDER — FLUTICASONE PROPIONATE 50 MCG
2 SPRAY, SUSPENSION (ML) NASAL DAILY
Qty: 48 G | Refills: 1 | Status: SHIPPED | OUTPATIENT
Start: 2025-07-19

## 2025-07-19 NOTE — PATIENT INSTRUCTIONS
Start flonase and monitor.     Vicks as needed    See urogyne for the urine issues    Continue to see oncology    Get your ultrasound done    Send the urine in a week or so.     Follow up as needed or when routine care is due    VISIT SUMMARY:  During your visit, we addressed your nasal congestion, chronic bronchitis, urinary symptoms, and a painful growth under your arm. We provided recommendations and next steps for each of these issues.    YOUR PLAN:  NASAL CONGESTION: You have been experiencing nasal congestion for the past three days, which may be affecting your cough.  -Use Flonase for nasal congestion.  -Apply Vicks vapor rub on your chest to help open your airways.    CHRONIC BRONCHITIS: Your chronic bronchitis has recently worsened, possibly due to postnasal drip.  -Stay well-hydrated to help with postnasal drip.    URINARY SYMPTOMS WITH POSSIBLE UTI: You have had burning during urination and other urinary symptoms. You recently finished a course of antibiotics, but further evaluation is needed.  -Use the provided urine collection kit to collect a sample at home.  -Drop off the urine sample at the lab three days after finishing your antibiotics.  -Contact Dr. Gregory for a follow-up with urology.  -We can provide contact information for a general urologist if needed.    AXILLARY MASS: You have a painful growth under your arm that has increased in size.  -We will order an ultrasound of the mass.  -If the mass is painful and you want it removed, we may refer you to general surgery.    Contains text generated by Kalpesh

## 2025-07-19 NOTE — PROGRESS NOTES
The following individual(s) verbally consented to be recorded using ambient AI listening technology and understand that they can each withdraw their consent to this listening technology at any point by asking the clinician to turn off or pause the recording:    Patient name: Mandie Amin is a 89 year old female.  CHIEF COMPLAINT   Multiple issues    HPI:     History of Present Illness  Mandie Amin is a 89 year old female with chronic urinary issues who presents with burning urination and nasal congestion. She is accompanied by her son, who assists with phone calls and note-taking.    She has been experiencing nasal congestion for the past three days, characterized by a very stuffy nose. She has not used Flonase or other nasal decongestants. She also has a chronic cough due to chronic bronchitis, but notes a recent change in its nature. Postnasal drip is present, which she believes triggers her cough reflex.    She describes a burning sensation during urination that began on Wednesday. The burning was severe, similar to past experiences, and she questioned her water intake. After increasing her water intake, the burning subsided by the next day. She recently completed a course of antibiotics, with the last dose taken today. Previous urine samples showed blood, leukocyte esterase, and white blood cells, but nitrites were negative, and cultures did not grow bacteria.  She has a hx of vulvar cancer and has done immunotherapy. No fever, chills, or visible blood in her urine. She describes her urine as sometimes appearing 'oily'.     She mentions a painful growth under her arm, which has grown larger and more painful. It is no longer in a 'little bag' and is attached, causing soreness.    No fever, chills, or visible blood in her urine. She describes her urine as sometimes appearing 'oily' and notes a history of urinary issues that have persisted for over two years.         Current  Medications[1]   Past Medical History[2]   Social History:  Short Social Hx on File[3]     REVIEW OF SYSTEMS:   See HPI     EXAM:     /74 (BP Location: Left arm, Patient Position: Sitting, Cuff Size: adult)   Pulse 56   Temp 97.1 °F (36.2 °C) (Temporal)   Resp 16   Ht 4' 9.99\" (1.473 m)   Wt 113 lb 9.6 oz (51.5 kg)   LMP  (LMP Unknown)   SpO2 100%   BMI 23.75 kg/m²   Body mass index is 23.75 kg/m².   GENERAL: well developed, well nourished,in no apparent distress  SKIN: mass to skin of axilla   HEENT: atraumatic, normocephalic   LUNGS: clear to auscultation; no rhonchi, rales, or wheezing  CARDIO: RRR without murmur  GI: no masses, organomegaly or tenderness  MUSCULOSKELETAL: No obvious joint deformity or swelling.  Normal gait.  EXTREMITIES: no edema or calve tenderness   NEURO: Oriented times three       LABS:      Lab Results   Component Value Date    WBC 6.2 05/08/2025    RBC 4.44 05/08/2025    HGB 12.9 05/08/2025    HCT 38.6 05/08/2025    MCV 86.9 05/08/2025    MCH 29.1 05/08/2025    MCHC 33.4 05/08/2025    RDW 13.6 05/08/2025    .0 05/08/2025      Lab Results   Component Value Date    GLU 92 05/08/2025    BUN 13 05/08/2025    BUNCREA NOT APPLICABLE 07/06/2022    CREATSERUM 0.77 05/08/2025    ANIONGAP 5 05/08/2025    GFR 72 12/08/2016    GFRNAA 69 07/06/2022    GFRAA 80 07/06/2022    CA 9.8 05/08/2025    OSMOCALC 278 05/08/2025    ALKPHO 88 05/08/2025    AST 23 05/08/2025    ALT 11 05/08/2025    BILT 0.5 05/08/2025    TP 7.2 05/08/2025    ALB 4.7 05/08/2025    GLOBULIN 2.5 05/08/2025    AGRATIO 1.8 07/06/2022     (L) 05/08/2025    K 4.7 05/08/2025     05/08/2025    CO2 27.0 05/08/2025      Lab Results   Component Value Date    CHOLEST 137 09/26/2023    TRIG 131 09/26/2023    HDL 56 09/26/2023    LDL 58 09/26/2023    VLDL 19 09/26/2023    TCHDLRATIO 2.6 07/06/2022    NONHDLC 81 09/26/2023      Lab Results   Component Value Date    T4F 1.3 05/08/2025    TSH 2.698 05/08/2025       Lab Results   Component Value Date     07/20/2021    A1C 5.9 (H) 07/06/2022        ASSESSMENT AND PLAN:   1. UTI symptoms  - Intermittent urinary symptoms with dysuria. Recent antibiotic course completed. Previous urinalysis showed blood, leukocyte esterase, and white blood cells, but negative nitrites and culture.    - Provide urine collection kit for home use- drop off sample to lab in 3-7 days  - Advise to contact Dr. Gregory for urology follow-up if urine sample clear but symptoms continue z  - Provide contact information for general urology if needed as well   - UROLOGY - INTERNAL  - Urogynecology Referral - In Network  - Urinalysis, Routine [E]; Future  - Urine Culture, Routine [E]; Future    2. Mass of left axilla  - get US done  - see general surgery as needed based on results   - US AXILLARY LEFT (CPT=76882); Future    3. Nasal congestion  - Nasal congestion for three days with possible sinus involvement and postnasal drip contributing to cough. No current use of Flonase.  - Recommend Flonase for nasal congestion  - Suggest Vicks vapor rub for chest to help open airways  - fluticasone propionate 50 MCG/ACT Nasal Suspension; 2 sprays by Each Nare route daily.  Dispense: 48 g; Refill: 1       The patient indicates understanding of these issues and agrees to the plan.  The patient is asked to return as needed or when routine care is due.         [1]   Current Outpatient Medications   Medication Sig Dispense Refill    traMADol 50 MG Oral Tab Take 1 tablet (50 mg total) by mouth daily as needed for Pain. 20 tablet 0    Acetaminophen 325 MG Oral Cap Take by mouth.      clotrimazole 1 % External Cream Apply 1 each topically 2 (two) times daily as needed. 1 each 0    warfarin 5 MG Oral Tab TAKE ONE-HALF TO ONE TABLET DAILY AS DIRECTED BY ANTICOAGULATION CLINIC 80 tablet 3    Losartan Potassium 25 MG Oral Tab Take 1 tablet (25 mg total) by mouth daily. 90 tablet 3    atorvastatin 10 MG Oral Tab Take 1  tablet (10 mg total) by mouth nightly. 90 tablet 3    digoxin (DIGOX) 0.125 MG Oral Tab TAKE 1 TABLET(125 MCG) BY MOUTH EVERY DAY 90 tablet 3    METOPROLOL TARTRATE 50 MG Oral Tab TAKE ONE TABLET BY MOUTH TWICE DAILY. 180 tablet 3    prednisoLONE acetate 1 % Ophthalmic Suspension Place 1 drop into both eyes in the morning and 1 drop before bedtime.  1    Vitamin D3 2000 units Oral Cap Take 1 capsule (2,000 Units total) by mouth in the morning.      ketorolac tromethamine 0.5 % Ophthalmic Solution Place 1 drop into both eyes in the morning and 1 drop before bedtime. 6AM AND 6PM. .      Meclizine HCl 12.5 MG Oral Tab Take 1 tablet (12.5 mg total) by mouth as needed in the morning and 1 tablet (12.5 mg total) as needed at noon and 1 tablet (12.5 mg total) as needed in the evening.      Dorzolamide HCl-Timolol Mal 22.3-6.8 MG/ML Ophthalmic Solution Place 1 drop into the right eye in the morning and 1 drop before bedtime.  2   [2]   Past Medical History:   Acute sinusitis    Arrhythmia    A-fib attacks 2003 and 2004    Asymptomatic microscopic hematuria    Since at least 2019 - seeing urologist 2022    Atrial fibrillation (HCC)    Benign paroxysmal vertigo    Breast lump    lower right breast    Bronchiectasis (HCC)    Cervical spondylosis    Cervical strain    Cervicalgia    Chronic cough    Diastolic dysfunction    Early cataract    Exudative age-related macular degeneration, left eye, with active choroidal neovascularization (HCC)    Hand fracture    third metacarpal phalangeal joint    Hard of hearing    Hearing impairment    NO HEARING AIDS    Heart disease    Heart murmur    on exam 2/1/22. Patient states she has a leaky valve    Herpes zoster    High blood pressure    High cholesterol    History of bone density study    Hx of motion sickness    IGT (impaired glucose tolerance)    Infection    right hand    Insomnia    Lipid screening    Liver lesion    Probably focal steatosis Unchanged 7 mos later, c/w hemangioma     Microscopic hematuria    Unremarkable cystoscopy 1/25/22 Dr. Fields. Renal US normal. CT urogram neg. 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 Left clitoral rocha. Dr. Rishabh Fonseca    Visual impairment    GLASSES    Vulvar cancer (HCC)    Vulvar cysts   [3]   Social History  Socioeconomic History    Marital status:     Number of children: 4   Occupational History    Occupation: RETIRED   Tobacco Use    Smoking status: Never    Smokeless tobacco: Never   Vaping Use    Vaping status: Never Used   Substance and Sexual Activity    Alcohol use: Not Currently     Alcohol/week: 0.0 - 0.8 standard drinks of alcohol     Comment: occ    Drug use: No    Sexual activity: Not Currently     Partners: Male   Other Topics Concern    Caffeine Concern Yes    Exercise Yes     Comment: some   Social History Narrative    , lives alone    Has 4 sons that live close by and help her out

## 2025-07-23 ENCOUNTER — LAB ENCOUNTER (OUTPATIENT)
Dept: LAB | Age: OVER 89
End: 2025-07-23
Attending: NURSE PRACTITIONER
Payer: MEDICARE

## 2025-07-23 DIAGNOSIS — R39.9 UTI SYMPTOMS: ICD-10-CM

## 2025-07-23 LAB
BILIRUB UR QL STRIP.AUTO: NEGATIVE
CLARITY UR REFRACT.AUTO: CLEAR
GLUCOSE UR STRIP.AUTO-MCNC: NORMAL MG/DL
KETONES UR STRIP.AUTO-MCNC: NEGATIVE MG/DL
LEUKOCYTE ESTERASE UR QL STRIP.AUTO: 500
NITRITE UR QL STRIP.AUTO: NEGATIVE
PH UR STRIP.AUTO: 6 [PH] (ref 5–8)
PROT UR STRIP.AUTO-MCNC: NEGATIVE MG/DL
SP GR UR STRIP.AUTO: 1.01 (ref 1–1.03)
UROBILINOGEN UR STRIP.AUTO-MCNC: NORMAL MG/DL

## 2025-07-23 PROCEDURE — 87086 URINE CULTURE/COLONY COUNT: CPT

## 2025-07-23 PROCEDURE — 81001 URINALYSIS AUTO W/SCOPE: CPT

## 2025-07-25 ENCOUNTER — HOSPITAL ENCOUNTER (OUTPATIENT)
Dept: NUCLEAR MEDICINE | Facility: HOSPITAL | Age: OVER 89
Discharge: HOME | End: 2025-07-25
Attending: INTERNAL MEDICINE

## 2025-07-25 DIAGNOSIS — C51.8 MALIGNANT NEOPLASM OF OVERLAPPING SITES OF VULVA (HCC): ICD-10-CM

## 2025-07-25 DIAGNOSIS — C51.9 VULVAR CANCER, CARCINOMA (HCC): ICD-10-CM

## 2025-07-25 LAB — GLUCOSE BLD-MCNC: 113 MG/DL (ref 70–99)

## 2025-07-25 PROCEDURE — 78815 PET IMAGE W/CT SKULL-THIGH: CPT | Performed by: INTERNAL MEDICINE

## 2025-07-25 PROCEDURE — 82962 GLUCOSE BLOOD TEST: CPT

## 2025-08-13 ENCOUNTER — OFFICE VISIT (OUTPATIENT)
Facility: LOCATION | Age: OVER 89
End: 2025-08-13
Attending: INTERNAL MEDICINE

## 2025-08-13 ENCOUNTER — NURSE ONLY (OUTPATIENT)
Facility: LOCATION | Age: OVER 89
End: 2025-08-13
Attending: INTERNAL MEDICINE

## 2025-08-13 VITALS
TEMPERATURE: 98 F | SYSTOLIC BLOOD PRESSURE: 130 MMHG | WEIGHT: 114.19 LBS | HEART RATE: 81 BPM | OXYGEN SATURATION: 97 % | BODY MASS INDEX: 23.97 KG/M2 | HEIGHT: 57.99 IN | DIASTOLIC BLOOD PRESSURE: 64 MMHG | RESPIRATION RATE: 16 BRPM

## 2025-08-13 DIAGNOSIS — C51.9 SQUAMOUS CELL CARCINOMA OF VULVA (HCC): ICD-10-CM

## 2025-08-13 DIAGNOSIS — Z51.5 PALLIATIVE CARE BY SPECIALIST: ICD-10-CM

## 2025-08-13 DIAGNOSIS — G89.3 NEOPLASM RELATED PAIN: Primary | ICD-10-CM

## 2025-08-13 DIAGNOSIS — G89.3 NEOPLASM RELATED PAIN: ICD-10-CM

## 2025-08-13 DIAGNOSIS — G47.9 SLEEPING DIFFICULTY: ICD-10-CM

## 2025-08-13 DIAGNOSIS — C51.9 SQUAMOUS CELL CARCINOMA OF VULVA (HCC): Primary | ICD-10-CM

## 2025-08-13 DIAGNOSIS — C51.9 VULVAR CANCER, CARCINOMA (HCC): ICD-10-CM

## 2025-08-13 LAB
ALBUMIN SERPL-MCNC: 4.4 G/DL (ref 3.2–4.8)
ALBUMIN/GLOB SERPL: 1.6 (ref 1–2)
ALP LIVER SERPL-CCNC: 89 U/L (ref 55–142)
ALT SERPL-CCNC: 11 U/L (ref 10–49)
ANION GAP SERPL CALC-SCNC: 11 MMOL/L (ref 0–18)
AST SERPL-CCNC: 22 U/L (ref ?–34)
BASOPHILS # BLD AUTO: 0.02 X10(3) UL (ref 0–0.2)
BASOPHILS NFR BLD AUTO: 0.3 %
BILIRUB SERPL-MCNC: 0.3 MG/DL (ref 0.2–0.9)
BUN BLD-MCNC: 12 MG/DL (ref 9–23)
CALCIUM BLD-MCNC: 9.3 MG/DL (ref 8.7–10.6)
CHLORIDE SERPL-SCNC: 102 MMOL/L (ref 98–112)
CO2 SERPL-SCNC: 21 MMOL/L (ref 21–32)
CREAT BLD-MCNC: 0.81 MG/DL (ref 0.55–1.02)
EGFRCR SERPLBLD CKD-EPI 2021: 68 ML/MIN/1.73M2 (ref 60–?)
EOSINOPHIL # BLD AUTO: 0.11 X10(3) UL (ref 0–0.7)
EOSINOPHIL NFR BLD AUTO: 1.8 %
ERYTHROCYTE [DISTWIDTH] IN BLOOD BY AUTOMATED COUNT: 13.8 %
GLOBULIN PLAS-MCNC: 2.7 G/DL (ref 2–3.5)
GLUCOSE BLD-MCNC: 124 MG/DL (ref 70–99)
HCT VFR BLD AUTO: 33.9 % (ref 35–48)
HGB BLD-MCNC: 11.1 G/DL (ref 12–16)
IMM GRANULOCYTES # BLD AUTO: 0.03 X10(3) UL (ref 0–1)
IMM GRANULOCYTES NFR BLD: 0.5 %
LYMPHOCYTES # BLD AUTO: 1.73 X10(3) UL (ref 1–4)
LYMPHOCYTES NFR BLD AUTO: 28.4 %
MCH RBC QN AUTO: 28.8 PG (ref 26–34)
MCHC RBC AUTO-ENTMCNC: 32.7 G/DL (ref 31–37)
MCV RBC AUTO: 87.8 FL (ref 80–100)
MONOCYTES # BLD AUTO: 0.61 X10(3) UL (ref 0.1–1)
MONOCYTES NFR BLD AUTO: 10 %
NEUTROPHILS # BLD AUTO: 3.6 X10 (3) UL (ref 1.5–7.7)
NEUTROPHILS # BLD AUTO: 3.6 X10(3) UL (ref 1.5–7.7)
NEUTROPHILS NFR BLD AUTO: 59 %
OSMOLALITY SERPL CALC.SUM OF ELEC: 279 MOSM/KG (ref 275–295)
PLATELET # BLD AUTO: 319 10(3)UL (ref 150–450)
POTASSIUM SERPL-SCNC: 4.3 MMOL/L (ref 3.5–5.1)
PROT SERPL-MCNC: 7.1 G/DL (ref 5.7–8.2)
RBC # BLD AUTO: 3.86 X10(6)UL (ref 3.8–5.3)
SODIUM SERPL-SCNC: 134 MMOL/L (ref 136–145)
T4 FREE SERPL-MCNC: 1.3 NG/DL (ref 0.8–1.7)
TSI SER-ACNC: 2.16 UIU/ML (ref 0.55–4.78)
WBC # BLD AUTO: 6.1 X10(3) UL (ref 4–11)

## 2025-08-13 RX ORDER — TRAMADOL HYDROCHLORIDE 50 MG/1
TABLET ORAL EVERY 8 HOURS PRN
Qty: 30 TABLET | Refills: 0 | Status: SHIPPED | OUTPATIENT
Start: 2025-08-13

## 2025-08-20 ENCOUNTER — HOSPITAL ENCOUNTER (OUTPATIENT)
Dept: LAB | Facility: HOSPITAL | Age: OVER 89
Discharge: HOME | End: 2025-08-20
Attending: INTERNAL MEDICINE

## 2025-08-20 DIAGNOSIS — Z79.01 LONG TERM (CURRENT) USE OF ANTICOAGULANTS: ICD-10-CM

## 2025-08-20 LAB — INR BLDC: 4.4 (ref 0.9–1.1)

## 2025-08-20 PROCEDURE — 85610 PROTHROMBIN TIME: CPT | Performed by: INTERNAL MEDICINE

## 2025-08-27 ENCOUNTER — HOSPITAL ENCOUNTER (OUTPATIENT)
Age: OVER 89
Discharge: ANOTHER HOSPITAL | End: 2025-08-27

## 2025-08-27 ENCOUNTER — HOSPITAL ENCOUNTER (EMERGENCY)
Facility: HOSPITAL | Age: OVER 89
Discharge: HOME | End: 2025-08-27
Attending: EMERGENCY MEDICINE

## 2025-08-27 ENCOUNTER — APPOINTMENT (OUTPATIENT)
Dept: GENERAL RADIOLOGY | Facility: HOSPITAL | Age: OVER 89
End: 2025-08-27
Attending: EMERGENCY MEDICINE

## 2025-08-27 VITALS
OXYGEN SATURATION: 100 % | DIASTOLIC BLOOD PRESSURE: 79 MMHG | TEMPERATURE: 97 F | HEART RATE: 92 BPM | RESPIRATION RATE: 18 BRPM | SYSTOLIC BLOOD PRESSURE: 149 MMHG

## 2025-08-27 VITALS
TEMPERATURE: 98 F | DIASTOLIC BLOOD PRESSURE: 86 MMHG | SYSTOLIC BLOOD PRESSURE: 148 MMHG | OXYGEN SATURATION: 100 % | RESPIRATION RATE: 16 BRPM | HEART RATE: 123 BPM

## 2025-08-27 DIAGNOSIS — L02.91 ABSCESS: ICD-10-CM

## 2025-08-27 DIAGNOSIS — L72.9 INFECTED CYST OF SKIN: ICD-10-CM

## 2025-08-27 DIAGNOSIS — L08.9 INFECTED CYST OF SKIN: ICD-10-CM

## 2025-08-27 DIAGNOSIS — I48.91 ATRIAL FIBRILLATION, UNSPECIFIED TYPE (HCC): Primary | ICD-10-CM

## 2025-08-27 DIAGNOSIS — I48.91 ATRIAL FIBRILLATION WITH RAPID VENTRICULAR RESPONSE (HCC): Primary | ICD-10-CM

## 2025-08-27 LAB
ALBUMIN SERPL-MCNC: 4.7 G/DL (ref 3.2–4.8)
ALBUMIN/GLOB SERPL: 1.8 (ref 1–2)
ALP LIVER SERPL-CCNC: 91 U/L (ref 55–142)
ALT SERPL-CCNC: 9 U/L (ref 10–49)
ANION GAP SERPL CALC-SCNC: 12 MMOL/L (ref 0–18)
APTT PPP: 31.7 SECONDS (ref 23–36)
AST SERPL-CCNC: 29 U/L (ref ?–34)
BASOPHILS # BLD AUTO: 0.02 X10(3) UL (ref 0–0.2)
BASOPHILS NFR BLD AUTO: 0.4 %
BILIRUB SERPL-MCNC: 0.5 MG/DL (ref 0.2–0.9)
BUN BLD-MCNC: 9 MG/DL (ref 9–23)
CALCIUM BLD-MCNC: 9.6 MG/DL (ref 8.7–10.6)
CHLORIDE SERPL-SCNC: 98 MMOL/L (ref 98–112)
CO2 SERPL-SCNC: 23 MMOL/L (ref 21–32)
CREAT BLD-MCNC: 0.82 MG/DL (ref 0.55–1.02)
DIGOXIN SERPL-MCNC: 0.18 NG/ML (ref 0.8–2)
EGFRCR SERPLBLD CKD-EPI 2021: 67 ML/MIN/1.73M2 (ref 60–?)
EOSINOPHIL # BLD AUTO: 0.09 X10(3) UL (ref 0–0.7)
EOSINOPHIL NFR BLD AUTO: 1.7 %
ERYTHROCYTE [DISTWIDTH] IN BLOOD BY AUTOMATED COUNT: 14.1 %
GLOBULIN PLAS-MCNC: 2.6 G/DL (ref 2–3.5)
GLUCOSE BLD-MCNC: 104 MG/DL (ref 70–99)
HCT VFR BLD AUTO: 37.5 % (ref 35–48)
HGB BLD-MCNC: 12.4 G/DL (ref 12–16)
IMM GRANULOCYTES # BLD AUTO: 0.02 X10(3) UL (ref 0–1)
IMM GRANULOCYTES NFR BLD: 0.4 %
INR BLD: 1.8 (ref 0.8–1.2)
LYMPHOCYTES # BLD AUTO: 1.29 X10(3) UL (ref 1–4)
LYMPHOCYTES NFR BLD AUTO: 23.8 %
MAGNESIUM SERPL-MCNC: 2 MG/DL (ref 1.6–2.6)
MCH RBC QN AUTO: 29 PG (ref 26–34)
MCHC RBC AUTO-ENTMCNC: 33.1 G/DL (ref 31–37)
MCV RBC AUTO: 87.6 FL (ref 80–100)
MONOCYTES # BLD AUTO: 0.42 X10(3) UL (ref 0.1–1)
MONOCYTES NFR BLD AUTO: 7.7 %
NEUTROPHILS # BLD AUTO: 3.59 X10 (3) UL (ref 1.5–7.7)
NEUTROPHILS # BLD AUTO: 3.59 X10(3) UL (ref 1.5–7.7)
NEUTROPHILS NFR BLD AUTO: 66 %
OSMOLALITY SERPL CALC.SUM OF ELEC: 275 MOSM/KG (ref 275–295)
PLATELET # BLD AUTO: 126 10(3)UL (ref 150–450)
PLATELETS.RETICULATED NFR BLD AUTO: 1.7 % (ref 0–7)
POTASSIUM SERPL-SCNC: 4.3 MMOL/L (ref 3.5–5.1)
POTASSIUM SERPL-SCNC: 4.5 MMOL/L (ref 3.5–5.1)
PROT SERPL-MCNC: 7.3 G/DL (ref 5.7–8.2)
PROTHROMBIN TIME: 21.1 SECONDS (ref 11.6–14.8)
RBC # BLD AUTO: 4.28 X10(6)UL (ref 3.8–5.3)
SODIUM SERPL-SCNC: 133 MMOL/L (ref 136–145)
TROPONIN I SERPL HS-MCNC: 6 NG/L (ref ?–34)
WBC # BLD AUTO: 5.4 X10(3) UL (ref 4–11)

## 2025-08-27 PROCEDURE — 10061 I&D ABSCESS COMP/MULTIPLE: CPT

## 2025-08-27 PROCEDURE — 85730 THROMBOPLASTIN TIME PARTIAL: CPT | Performed by: EMERGENCY MEDICINE

## 2025-08-27 PROCEDURE — 96374 THER/PROPH/DIAG INJ IV PUSH: CPT

## 2025-08-27 PROCEDURE — 85025 COMPLETE CBC W/AUTO DIFF WBC: CPT | Performed by: EMERGENCY MEDICINE

## 2025-08-27 PROCEDURE — 93005 ELECTROCARDIOGRAM TRACING: CPT

## 2025-08-27 PROCEDURE — 85610 PROTHROMBIN TIME: CPT | Performed by: EMERGENCY MEDICINE

## 2025-08-27 PROCEDURE — 80162 ASSAY OF DIGOXIN TOTAL: CPT | Performed by: EMERGENCY MEDICINE

## 2025-08-27 PROCEDURE — 99215 OFFICE O/P EST HI 40 MIN: CPT | Performed by: NURSE PRACTITIONER

## 2025-08-27 PROCEDURE — 84132 ASSAY OF SERUM POTASSIUM: CPT | Performed by: EMERGENCY MEDICINE

## 2025-08-27 PROCEDURE — 99285 EMERGENCY DEPT VISIT HI MDM: CPT

## 2025-08-27 PROCEDURE — 71045 X-RAY EXAM CHEST 1 VIEW: CPT | Performed by: EMERGENCY MEDICINE

## 2025-08-27 PROCEDURE — 93010 ELECTROCARDIOGRAM REPORT: CPT

## 2025-08-27 PROCEDURE — 80053 COMPREHEN METABOLIC PANEL: CPT | Performed by: EMERGENCY MEDICINE

## 2025-08-27 PROCEDURE — 84484 ASSAY OF TROPONIN QUANT: CPT | Performed by: EMERGENCY MEDICINE

## 2025-08-27 PROCEDURE — 83735 ASSAY OF MAGNESIUM: CPT | Performed by: EMERGENCY MEDICINE

## 2025-08-27 PROCEDURE — 93000 ELECTROCARDIOGRAM COMPLETE: CPT | Performed by: NURSE PRACTITIONER

## 2025-08-27 RX ORDER — LIDOCAINE HYDROCHLORIDE AND EPINEPHRINE 10; 10 MG/ML; UG/ML
INJECTION, SOLUTION INFILTRATION; PERINEURAL
Status: COMPLETED
Start: 2025-08-27 | End: 2025-08-27

## 2025-08-27 RX ORDER — LIDOCAINE HYDROCHLORIDE AND EPINEPHRINE 10; 10 MG/ML; UG/ML
20 INJECTION, SOLUTION INFILTRATION; PERINEURAL ONCE
Status: COMPLETED | OUTPATIENT
Start: 2025-08-27 | End: 2025-08-27

## 2025-08-27 RX ORDER — METOPROLOL TARTRATE 1 MG/ML
2.5 INJECTION, SOLUTION INTRAVENOUS ONCE
Status: COMPLETED | OUTPATIENT
Start: 2025-08-27 | End: 2025-08-27

## 2025-08-27 RX ORDER — CEPHALEXIN 500 MG/1
500 CAPSULE ORAL 2 TIMES DAILY
Qty: 14 CAPSULE | Refills: 0 | Status: SHIPPED | OUTPATIENT
Start: 2025-08-27 | End: 2025-08-28

## 2025-08-28 ENCOUNTER — HOSPITAL ENCOUNTER (OUTPATIENT)
Age: OVER 89
Discharge: HOME | End: 2025-08-28

## 2025-08-28 VITALS
DIASTOLIC BLOOD PRESSURE: 81 MMHG | TEMPERATURE: 98 F | HEART RATE: 96 BPM | OXYGEN SATURATION: 99 % | RESPIRATION RATE: 16 BRPM | SYSTOLIC BLOOD PRESSURE: 119 MMHG

## 2025-08-28 DIAGNOSIS — Z51.89 WOUND CHECK, ABSCESS: Primary | ICD-10-CM

## 2025-08-28 LAB
Q-T INTERVAL: 318 MS
Q-T INTERVAL: 354 MS
QRS DURATION: 108 MS
QRS DURATION: 110 MS
QTC CALCULATION (BEZET): 430 MS
QTC CALCULATION (BEZET): 483 MS
R AXIS: 76 DEGREES
R AXIS: 77 DEGREES
T AXIS: -5 DEGREES
T AXIS: 4 DEGREES
VENTRICULAR RATE: 110 BPM
VENTRICULAR RATE: 112 BPM

## 2025-08-30 ENCOUNTER — HOSPITAL ENCOUNTER (OUTPATIENT)
Age: OVER 89
Discharge: HOME | End: 2025-08-30

## 2025-08-30 VITALS
RESPIRATION RATE: 24 BRPM | OXYGEN SATURATION: 98 % | DIASTOLIC BLOOD PRESSURE: 68 MMHG | TEMPERATURE: 98 F | SYSTOLIC BLOOD PRESSURE: 127 MMHG | HEART RATE: 77 BPM

## 2025-08-30 DIAGNOSIS — Z51.89 ENCOUNTER FOR WOUND RE-CHECK: Primary | ICD-10-CM

## (undated) DIAGNOSIS — R82.90 URINE FINDING: Primary | ICD-10-CM

## (undated) DEVICE — HOOD, PEEL-AWAY: Brand: FLYTE

## (undated) DEVICE — Device: Brand: STABLECUT®

## (undated) DEVICE — STERILE POLYISOPRENE POWDER-FREE SURGICAL GLOVES: Brand: PROTEXIS

## (undated) DEVICE — PADDING CAST COTTON  4

## (undated) DEVICE — WRAP COOLING KNEE W/ICE PILLOW

## (undated) DEVICE — CONVERTORS STOCKINETTE: Brand: CONVERTORS

## (undated) DEVICE — SUTURE VICRYL 2-0 CP-1

## (undated) DEVICE — KENDALL SCD EXPRESS SLEEVES, KNEE LENGTH, MEDIUM: Brand: KENDALL SCD

## (undated) DEVICE — DRESSING AQUACEL AG 3.5X12

## (undated) DEVICE — CHLORAPREP 26ML APPLICATOR

## (undated) DEVICE — DECANTER BAG 9": Brand: MEDLINE INDUSTRIES, INC.

## (undated) DEVICE — ATTUNE PINNING SYSTEM
Type: IMPLANTABLE DEVICE | Site: KNEE
Brand: ATTUNE

## (undated) DEVICE — SPECIMEN CONTAINER,POSITIVE SEAL INDICATOR, OR PACKAGED: Brand: PRECISION

## (undated) DEVICE — ZIMMER® STERILE DISPOSABLE TOURNIQUET CUFF WITH PLC, DUAL PORT, SINGLE BLADDER, 34 IN. (86 CM)

## (undated) DEVICE — BANDAGE ELASTIC ACE 6\" X-LONG

## (undated) DEVICE — GOWN SURG AERO CHROME XXL

## (undated) DEVICE — UNIVERSAL STERIBUMP® STERILE (5/CASE): Brand: UNIVERSAL STERIBUMP®

## (undated) DEVICE — SUTURE STRATAFIX PDS PLUS 45CM

## (undated) DEVICE — TOTAL KNEE CDS: Brand: MEDLINE INDUSTRIES, INC.

## (undated) DEVICE — 3M™ COBAN™ NL STERILE NON-LATEX SELF-ADHERENT WRAP, 2084S, 4 IN X 5 YD (10 CM X 4,5 M), 18 ROLLS/CASE: Brand: 3M™ COBAN™

## (undated) DEVICE — SOL  .9 1000ML BTL

## (undated) DEVICE — 3M™ IOBAN™ 2 ANTIMICROBIAL INCISE DRAPE 6651EZ: Brand: IOBAN™ 2

## (undated) DEVICE — DRAPE,U/SHT,SPLIT,FILM,60X84,STERILE: Brand: MEDLINE

## (undated) DEVICE — Device: Brand: PLUMEPEN

## (undated) DEVICE — 3M™ MICROFOAM™ TAPE 1528-4: Brand: 3M™ MICROFOAM™

## (undated) DEVICE — BOWL CEMENT MIX QUICK-VAC

## (undated) NOTE — LETTER
[facility]  Phone: [phone]  Right Fax: [phone]  ABNORMAL VALUES FAX    Sent By:  Sunil Cunha RN Date: 1/21/19    Patient Name: Radha Tafoya  Surgery Date: 1/28/2019    CSN: [id number]  Medical Record: [id number]

## (undated) NOTE — LETTER
10/30/19        Sandra Edward  [address] [place] [address] Court  [place] [phone]      Dear Manuel Shaikh,    [facility] records indicate that you have outstanding lab work and or testing that was ordered for you and has not yet been completed:  Orders Placed This Encounter

## (undated) NOTE — LETTER
November 22, 2019     Nikki Borges  [address] [phone]      Dear Samara Conrad:    Below are the results from your recent visit:    Resulted Orders   HEMOGLOBIN A1C   Result Value Ref Range    HgbA1C 6.1 (H) <5.7 %    Estimated Averag

## (undated) NOTE — LETTER
06/04/21    Raulito Notice  [phone]    Dear Maryam Morrison,    This is a friendly reminder to let you know you have outstanding lab work as listed below.   To schedule an appointment please call Central Scheduling at [phone]

## (undated) NOTE — LETTER
June 26, 2017    Edmund Nguyễn  [address] [facility] [address]      Dear Malou Pérez: The following are the results of your recent tests. Please review the list of test results.   Your result is the value on the left; we have also sup

## (undated) NOTE — MR AVS SNAPSHOT
[address] [id number] [phone]               Thank you for choosing us for your health care visit with Jony Palmer MD.  We are glad to serve you and happy to provid Today's Vital Signs     BP Pulse Temp Height Weight BMI    118/62 mmHg 64 97.4 °F (36.3 °C) (Oral) 57.25\" 136 lb 29.18 kg/m2         Current Medications          This list is accurate as of: 6/22/17 11:19 AM.  Always use your most recent med list. BILIRUBIN NEGATIVE Negative    KETONES (URINE DIPSTICK) NEGATIVE Negative mg/dL    SPECIFIC GRAVITY 1.010 1.005 - 1.030    OCCULT BLOOD SMALL Negative    PH, URINE 5.0 4.5 - 8.0    PROTEIN (URINE DIPSTICK) NEGATIVE Negative/Trace mg/dL    UROBILINOGEN,LAMAR

## (undated) NOTE — ED AVS SNAPSHOT
[facility] JACKIE  [place] [address]    Phone:  [phone]    Fax:  [phone]           Jerrod Gabino   MRN: [id number]    Department:  THE [facility]   Date of Visit: START taking these medications     Ciprofloxacin HCl 500 MG Tabs   Quantity:  14 tablet   Commonly known as:  CIPRO   Take 1 tablet (500 mg total) by mouth 2 (two) times daily.             Where to Get Your Medications      These medications were sent to [facility] receive this, we would really appreciate it if you could take the time to complete it. Thank you! You were examined and treated today on an urgent basis only. This was not a substitute for ongoing medical care.  Often, one Immediate Care visit does no 4455  [facility] ([address]) [address] ([address]) [address]  [place] ([place]) 21  [address] ([address]) 832

## (undated) NOTE — MR AVS SNAPSHOT
[address] [address] [phone]               Thank you for choosing us for your health care visit with Jayla Jones MD.  We are glad to serve you and happy to provid TAKE 1 TABLET BY MOUTH EVERY NIGHT AT BEDTIME   Commonly known as:  LIPITOR           BENADRYL 25 MG Caps   Generic drug:  DiphenhydrAMINE HCl   1 tab po qhs prn           DIGOX 0.125 MG Tabs   Generic drug:  digoxin   TAKE 1 TABLET BY MOUTH DAILY [address] [facility] [place] [address]     Phone:  [phone]    - Pantoprazole Sodium 40 MG Tbec            Today's Orders     Ortho Referral - In Network    Complete by:  As directed    Assoc Dx:  Right forearm pain Johan Victoria

## (undated) NOTE — LETTER
Patient Name: José Miguel Alex  YOB: 1934          MRN :  [id number]  Date:  7/24/2023  Referring Physician:  No ref. provider found                 Anthony 2484 Notice to Patient: Medical documents like this are made available to patients in the interest of transparency. However, be advised this is a medical document and it is intended as jeet-va-fmus communication between your medical providers. This medical document may contain abbreviations, assessments, medical data, and results or other terms that are unfamiliar. Medical documents are intended to carry relevant information, facts as evident, and the clinical opinion of the practitioner. As such, this medical document may be written in language that appears blunt or direct. You are encouraged to contact your medical provider and/or [facility] Patient Experience if you have any questions about this medical document.

## (undated) NOTE — LETTER
09/11/17        Jinx Orf  [address] [facility] [address]    4/12/1934     Dear Terrie Light,    [facility] records indicate that you have outstanding lab work and or testing that was ordered for you and has not yet been completed:

## (undated) NOTE — LETTER
November 22, 2019     Servando Lassiter  [address] [address]      Dear Ayaan April:    Below are the results from your recent visit:    Resulted Orders   HEMOGLOBIN A1C   Result Value Ref Range    HgbA1C 6.1 (H) <5.7 %    Estimated Averag

## (undated) NOTE — LETTER
Patient Name: Leticia Martínez  YOB: 1934          MRN number:  [id number]  Date:  7/24/2023  Referring Physician:  Dr. Bernabe Buchanan       Dear Dr. Adwoa An MD, Dr. Bernabe Buchanan,     This letter is to inform you of Daljit Roberts in Physical Therapy at [place] [address] and Sports Medicine. DX: R hip OA        TREATMENT #  10     Of 10        DATE OF SERVICE: 7/19/2023    ASSESSMENT  Patient reports overall 50% improvement in her R hip pain. Has good days and bad days with her hip pain. States however, new more limiting pain is now going down her L LE since sitting in her rocking chair. Pt.'s new symptoms appear to be coming from her back and radiating into L LE. Pt. Will follow-up with orthopedic MD regarding new lumbar pain and radicular L LE symptoms. States 2-5/10 pain in R hip which is more tolerable. Patient reports she is independent with current HEP and will continue on her own at this time. OTHER  Pain: 2-5/10 pain in R hip depending on the day. States new pain radiating into  LLE increases to 6-8/10  Observation/Posture: fwd flexed, rounded shoulders, using RW  Neuro Screen: intact to light touch. Pt. Reports new pain radiating into L LE.    lumbar AROM: (* denotes performed with pain)  Flexion: 0cm  Extension: 4cm*  Sidebending: R 13cm; L 15cm  Rotation: R 59cm; L 61cm    R hip AROM (*pain with movement into groin)  Flexion 90 deg  Ext 5 deg  ER 15 deg  IR 25 deg    Accessory motion: hypomobility lumbar mobility, slightly improved R hip mobility  Palpation: tenderness throughout  lumbar paraspinals, tenderness R greater trochanter, along R ITB and R hip flexor.      Strength: (* denotes performed with pain)  Core strength: upper and lower abs 3+/5  LE   Hip flexion (L2): R 4-/5*; L 4/5  Hip abduction: R 4/5*; L 4/5  Hip Extension: R 4-/5*; L 4-/5   Hip ER: R 3/5*; L 4/5  Hip IR: R 3+/5*; L 4/5  Knee Flexion: R 4/5; L 4/5   Knee extension (L3): R 4/5; L 4/5   DF (L4): R 4+/5; L 4+/5  Great Toe Ext (L5): R 4/5, L 4/5  PF (S1): R 4-/5; L 4-/5     Flexibility:   LE   Hip Flexor: Rmajor, L major  Hamstrings: R -30; L -20  Piriformis: R major; L major  Quads: R mod; L mod  Gastroc-soleus: R mild; L mild     Special tests:   (+) R slump test; (+) L slump test. Repeated lumbar flexion = stretching, no radicular pain; Repeated lumbar extension = increased LBP and pain into L LE to knee    Gait: pt ambulates on level ground with RW with antalgia, decreased step length L, decreased stance phase R, decreased hip/knee flex or ext R > L and trendelenburg/waddle. Balance: SLS R 3, L unable  Time sit-stand: 15 seconds  TUG: RW 20 seconds    LONG AND SHORT TERM GOALS    Goals: (to be met in 10 visits)     1. Improve core stability by 1/2 grade to promote improved posture and body mechanics with bending and lifting. Met    2. Improve R hip ROM by 5 degrees in all planes to allow patient to change positions with more ease. Met    3. Improve SLS balance by 3 seconds B to decrease fall risk and improve safety at home. Progress    4. Improve B LE strength by 1/2 grade to allow patient to negotiate stairs with more ease. Progress    5. Patient to tolerated walking with LRAD for 10 minutes to perform household ambulation and to/from car with more ease. Met    6. Improve B LE HS flexibility by 5 degrees to decrease stress on lumbar/pelvic region. Met    7. Patient to be independent with HEP, joint protection principles, improved posture and body mechanics. Met      RECOMMENDATIONS AND PLAN OF TREATMENT  All goals met except now unable to balance on L LE due to new radicular pain down to knee. Pt. Will follow up with MD regarding new L LE radicular symptoms. REHAB POTENTIAL  Good - All functional goals met.     Patient/family/caregiver was advised of these findings, precautions, and treatment options and has agreed to actively participate in planning and for this course of wili.    Chente Borja OF [facility]. IF YOU HAVE ANY QUESTIONS PLEASE CONTACT ME AT: [facility] [phone]          SINCERELY,           Kate Bennett, PT    PHYSICIAN'S CERTIFICATION REQUIRED: no  I certify the need for these services furnished under this plan of treatment and while under my care. X_________________________________________________ Date: ____________________    CERTIFICATION FROM: 6/37/3758 to 10/17/2023    Thank you for the referral of [facility]. Pt. To contact MD regarding LBP with L LE radiculopathy. Fax #Elizabeth Dickson [phone]  [phone]      [employer] Notice to Patient: Medical documents like this are made available to patients in the interest of transparency. However, be advised this is a medical document and it is intended as izbp-ox-ngrv communication between your medical providers. This medical document may contain abbreviations, assessments, medical data, and results or other terms that are unfamiliar. Medical documents are intended to carry relevant information, facts as evident, and the clinical opinion of the practitioner. As such, this medical document may be written in language that appears blunt or direct. You are encouraged to contact your medical provider and/or [facility] Patient Experience if you have any questions about this medical document.

## (undated) NOTE — LETTER
Requirements for Pre-Operative Clearance Requests  **All Fields Must Be Completed**    10/29/2024    Dear Surgeon,    We have been notified that your patient Mandie Amin  1934, is scheduled for surgery and that you would like us to clear him/her for this procedure.  In order to comply with governmental coding requirements and in order to bill for our services, the following is required for us to be able to see this patient for pre-operative clearance.     Pre-op appointment is scheduled on 24 with Nakia Escamilla NP    Comorbid diagnosis for which clearance is requested:          Surgical Diagnosis:            Procedure:           Surgeon:            Date of surgery:          Length of Surgery:      __________________________________________  Type of anesthesia:     __________________________________________  Location of surgery:           Phone:          Fax:         Completed pre-operative clearance should be faxed to:    Attention:          Phone:         Fax:         Check appropriate boxes:    ¨ Pre-op testing ordered by surgeon  ¨ Pre-op testing to be ordered by pre-admission testing  ¨ No pre-op testing needed  ¨ This Pre Op Appt ok to be signed by NP  When this form is complete, please fax back to [phone]

## (undated) NOTE — LETTER
Terrie Hyman  [address] [phone]             04/07/22    Dear Denise Haynes:  I wanted to reach out to you personally as I feel you would benefit from our Chronic Care Management program here at [facility]. [facility] has recently reached out to you on my behalf to introduce the program and all its benefits. To help you take control of your health and provide you with optimal quality care, I am recommending this program to you. If you choose to enroll in this Chronic Care Management program you will be working closely with your very own Health  Care Manager. They will help provide you with the extra support and education needed to keep you healthy, as well as keeping me informed about you and your health in between office visits. I ask that you take the time to review the information the Health  sent you, and consider all of the outstanding benefits available to you. Your health is important to me! The Health , [facility], will be calling you again soon to discuss your final decision and any questions you may have. You may also call her at [phone].      Thank you for considering,    Dr. Cerda Aw  [address] [place] [id number]

## (undated) NOTE — LETTER
10/29/18        Tammie Para  [address]      Dear Allan Weir,    [address] [facility] records indicate that you have outstanding lab work and or testing that was ordered for you and has not yet been completed:  Orders Placed This Enc